# Patient Record
Sex: MALE | Race: WHITE | Employment: OTHER | URBAN - METROPOLITAN AREA
[De-identification: names, ages, dates, MRNs, and addresses within clinical notes are randomized per-mention and may not be internally consistent; named-entity substitution may affect disease eponyms.]

---

## 2017-05-22 ENCOUNTER — OFFICE VISIT (OUTPATIENT)
Dept: PODIATRY | Age: 82
End: 2017-05-22
Payer: MEDICARE

## 2017-05-22 VITALS
HEART RATE: 91 BPM | WEIGHT: 232 LBS | DIASTOLIC BLOOD PRESSURE: 74 MMHG | HEIGHT: 75 IN | BODY MASS INDEX: 28.85 KG/M2 | SYSTOLIC BLOOD PRESSURE: 132 MMHG

## 2017-05-22 DIAGNOSIS — Z79.4 TYPE 2 DIABETES MELLITUS WITH DIABETIC POLYNEUROPATHY, WITH LONG-TERM CURRENT USE OF INSULIN (HCC): ICD-10-CM

## 2017-05-22 DIAGNOSIS — L84 PRE-ULCERATIVE CORN OR CALLOUS: ICD-10-CM

## 2017-05-22 DIAGNOSIS — L98.9 BENIGN SKIN LESION: ICD-10-CM

## 2017-05-22 DIAGNOSIS — B35.1 ONYCHOMYCOSIS: Primary | ICD-10-CM

## 2017-05-22 DIAGNOSIS — E11.42 TYPE 2 DIABETES MELLITUS WITH DIABETIC POLYNEUROPATHY, WITH LONG-TERM CURRENT USE OF INSULIN (HCC): ICD-10-CM

## 2017-05-22 DIAGNOSIS — M21.962 FOOT DEFORMITY, LEFT: ICD-10-CM

## 2017-05-22 PROCEDURE — 1036F TOBACCO NON-USER: CPT | Performed by: PODIATRIST

## 2017-05-22 PROCEDURE — 11721 DEBRIDE NAIL 6 OR MORE: CPT | Performed by: PODIATRIST

## 2017-05-22 RX ORDER — METOPROLOL TARTRATE 50 MG/1
50 TABLET, FILM COATED ORAL DAILY
COMMUNITY
Start: 2017-05-06

## 2017-05-22 ASSESSMENT — ENCOUNTER SYMPTOMS
NAUSEA: 0
COLOR CHANGE: 0
DIARRHEA: 0
CONSTIPATION: 0
VOMITING: 0

## 2017-05-31 ENCOUNTER — NURSE ONLY (OUTPATIENT)
Dept: PODIATRY | Age: 82
End: 2017-05-31

## 2017-05-31 DIAGNOSIS — E11.42 TYPE 2 DIABETES MELLITUS WITH DIABETIC POLYNEUROPATHY, WITH LONG-TERM CURRENT USE OF INSULIN (HCC): Primary | ICD-10-CM

## 2017-05-31 DIAGNOSIS — Z79.4 TYPE 2 DIABETES MELLITUS WITH DIABETIC POLYNEUROPATHY, WITH LONG-TERM CURRENT USE OF INSULIN (HCC): Primary | ICD-10-CM

## 2017-05-31 DIAGNOSIS — L84 PRE-ULCERATIVE CORN OR CALLOUS: ICD-10-CM

## 2017-05-31 DIAGNOSIS — M21.962 FOOT DEFORMITY, LEFT: ICD-10-CM

## 2017-06-16 ENCOUNTER — NURSE ONLY (OUTPATIENT)
Dept: PODIATRY | Age: 82
End: 2017-06-16
Payer: MEDICARE

## 2017-06-16 DIAGNOSIS — M21.962 FOOT DEFORMITY, LEFT: ICD-10-CM

## 2017-06-16 DIAGNOSIS — L84 PRE-ULCERATIVE CORN OR CALLOUS: ICD-10-CM

## 2017-06-16 DIAGNOSIS — Z79.4 TYPE 2 DIABETES MELLITUS WITH DIABETIC POLYNEUROPATHY, WITH LONG-TERM CURRENT USE OF INSULIN (HCC): Primary | ICD-10-CM

## 2017-06-16 DIAGNOSIS — E11.42 TYPE 2 DIABETES MELLITUS WITH DIABETIC POLYNEUROPATHY, WITH LONG-TERM CURRENT USE OF INSULIN (HCC): Primary | ICD-10-CM

## 2017-06-16 PROCEDURE — A5500 DIAB SHOE FOR DENSITY INSERT: HCPCS | Performed by: PODIATRIST

## 2017-06-16 PROCEDURE — A5513 MULTI DEN INSERT CUSTOM MOLD: HCPCS | Performed by: PODIATRIST

## 2017-08-21 ENCOUNTER — PROCEDURE VISIT (OUTPATIENT)
Dept: PODIATRY | Age: 82
End: 2017-08-21
Payer: MEDICARE

## 2017-08-21 VITALS
DIASTOLIC BLOOD PRESSURE: 87 MMHG | HEIGHT: 75 IN | HEART RATE: 81 BPM | SYSTOLIC BLOOD PRESSURE: 163 MMHG | WEIGHT: 232 LBS | BODY MASS INDEX: 28.85 KG/M2

## 2017-08-21 DIAGNOSIS — E11.42 TYPE 2 DIABETES MELLITUS WITH DIABETIC POLYNEUROPATHY, WITH LONG-TERM CURRENT USE OF INSULIN (HCC): ICD-10-CM

## 2017-08-21 DIAGNOSIS — Z79.4 TYPE 2 DIABETES MELLITUS WITH DIABETIC POLYNEUROPATHY, WITH LONG-TERM CURRENT USE OF INSULIN (HCC): ICD-10-CM

## 2017-08-21 DIAGNOSIS — B35.1 ONYCHOMYCOSIS: Primary | ICD-10-CM

## 2017-08-21 PROCEDURE — 11721 DEBRIDE NAIL 6 OR MORE: CPT | Performed by: PODIATRIST

## 2017-08-21 PROCEDURE — 1036F TOBACCO NON-USER: CPT | Performed by: PODIATRIST

## 2017-08-21 RX ORDER — MONTELUKAST SODIUM 10 MG/1
TABLET ORAL
COMMUNITY
Start: 2017-08-15

## 2017-08-21 ASSESSMENT — ENCOUNTER SYMPTOMS
DIARRHEA: 0
COLOR CHANGE: 0
NAUSEA: 0
CONSTIPATION: 0
VOMITING: 0

## 2017-10-18 ENCOUNTER — NURSE ONLY (OUTPATIENT)
Dept: PODIATRY | Age: 82
End: 2017-10-18

## 2017-11-02 ENCOUNTER — NURSE ONLY (OUTPATIENT)
Dept: PODIATRY | Age: 82
End: 2017-11-02

## 2017-11-06 ENCOUNTER — NURSE ONLY (OUTPATIENT)
Dept: PODIATRY | Age: 82
End: 2017-11-06

## 2017-11-06 DIAGNOSIS — Z79.4 TYPE 2 DIABETES MELLITUS WITH DIABETIC POLYNEUROPATHY, WITH LONG-TERM CURRENT USE OF INSULIN (HCC): Primary | ICD-10-CM

## 2017-11-06 DIAGNOSIS — E11.42 TYPE 2 DIABETES MELLITUS WITH DIABETIC POLYNEUROPATHY, WITH LONG-TERM CURRENT USE OF INSULIN (HCC): Primary | ICD-10-CM

## 2017-11-06 DIAGNOSIS — M21.962 FOOT DEFORMITY, LEFT: ICD-10-CM

## 2017-11-06 DIAGNOSIS — L84 PRE-ULCERATIVE CORN OR CALLOUS: ICD-10-CM

## 2017-11-16 ENCOUNTER — NURSE ONLY (OUTPATIENT)
Dept: PODIATRY | Age: 82
End: 2017-11-16

## 2017-11-16 NOTE — PROGRESS NOTES
Dispensed 1 pair of diabetic shoes with 3 pairs of custom insoles. Appropriate papperwork was obtained from the primary care physician and the patient was fitted in the office.

## 2018-03-06 ENCOUNTER — NURSE ONLY (OUTPATIENT)
Dept: PODIATRY | Age: 83
End: 2018-03-06
Payer: MEDICARE

## 2018-03-06 VITALS
HEART RATE: 62 BPM | HEIGHT: 75 IN | SYSTOLIC BLOOD PRESSURE: 133 MMHG | BODY MASS INDEX: 28.85 KG/M2 | WEIGHT: 232 LBS | DIASTOLIC BLOOD PRESSURE: 64 MMHG

## 2018-03-06 DIAGNOSIS — B35.1 ONYCHOMYCOSIS: ICD-10-CM

## 2018-03-06 DIAGNOSIS — M21.962 FOOT DEFORMITY, LEFT: ICD-10-CM

## 2018-03-06 DIAGNOSIS — Z79.4 TYPE 2 DIABETES MELLITUS WITH DIABETIC POLYNEUROPATHY, WITH LONG-TERM CURRENT USE OF INSULIN (HCC): ICD-10-CM

## 2018-03-06 DIAGNOSIS — L84 PRE-ULCERATIVE CORN OR CALLOUS: ICD-10-CM

## 2018-03-06 DIAGNOSIS — L98.9 BENIGN SKIN LESION: Primary | ICD-10-CM

## 2018-03-06 DIAGNOSIS — E11.42 TYPE 2 DIABETES MELLITUS WITH DIABETIC POLYNEUROPATHY, WITH LONG-TERM CURRENT USE OF INSULIN (HCC): ICD-10-CM

## 2018-03-06 PROCEDURE — 11055 PARING/CUTG B9 HYPRKER LES 1: CPT | Performed by: PODIATRIST

## 2018-03-06 PROCEDURE — 11721 DEBRIDE NAIL 6 OR MORE: CPT | Performed by: PODIATRIST

## 2018-03-06 RX ORDER — BETAMETHASONE DIPROPIONATE 0.5 MG/G
CREAM TOPICAL
COMMUNITY
Start: 2018-03-02 | End: 2019-04-24

## 2018-03-06 ASSESSMENT — ENCOUNTER SYMPTOMS
VOMITING: 0
COLOR CHANGE: 0
CONSTIPATION: 0
NAUSEA: 0
DIARRHEA: 0

## 2018-03-06 NOTE — PROGRESS NOTES
[facility]  Return Patient    Chief Complaint   Patient presents with    Callouses     painful callous left foot/ last saw Jose A Hollingsworth 11/2/2017    Other     patient does not know last blood sugar       Subjective: Mary Grace Coughlin comes to clinic for [address] (painful callous left foot/ last saw Jose A Hollingsworth 11/2/2017) and Other (patient does not know last blood sugar)    he is a diabetic and states that his last A1c was 5.0.. He is doing much better and would like his nails trimmed. No problems on the left foot. Pt's primary care physician is Kelly Vigil, DO   Pt's last blood sugar was a little high. He also has a callous on his left foot that he wishes to have taken care of. No results found for: LABA1C   Complains of numbness in the feet bilat. Past Medical History:   Diagnosis Date    Arthritis     Asthma     Cystitis     Diabetic neuropathy (HCC)     Diarrhea     GERD (gastroesophageal reflux disease)     H/O acute bronchitis     [place] (hard of hearing)     Hx-TIA (transient ischemic attack)     h/o 4,  2009, 2012, 2014, 2015    Hypertension     Kyphosis of thoracolumbar region     Neurogenic claudication     Numbness and tingling     feet    Onychomycosis     Prolonged emergence from general anesthesia     Spinal stenosis of lumbar region     Spondylolysis of lumbar region     Type II or unspecified type diabetes mellitus without mention of complication, not stated as uncontrolled     Wears glasses        Allergies   Allergen Reactions    Bactrim [Sulfamethoxazole-Trimethoprim]      Unknown reaction    Other      baset    Sulfa Antibiotics Other (See Comments)     psoriasis    Sulfur      Causes pt to break out psorsias    Pcn [Penicillins] Hives     Also causes pt's throat to swell a little.      Current Outpatient Prescriptions on File Prior to Visit   Medication Sig Dispense Refill    montelukast (SINGULAIR) 10 MG tablet       metoprolol tartrate (LOPRESSOR) 50 MG tablet       omeprazole (PRILOSEC) 20 MG delayed release capsule Take 20 mg by mouth Daily Indications: ([facility] Mail order)       meloxicam (MOBIC) 15 MG tablet Take 15 mg by mouth daily Indications: ([facility] Mail order)       budesonide-formoterol (SYMBICORT) 160-4.5 MCG/ACT AERO Inhale 1 puff into the lungs 2 times daily Indications: ([facility] Mail order)       LANTUS SOLOSTAR 100 UNIT/ML injection pen Inject 60 Units into the skin daily (Patient taking differently: Inject 50 Units into the skin daily Indications: ([facility] order) ) 5 Pen 3    BD ULTRA-FINE PEN NEEDLES 29G X 12.7MM MISC Inject 1 each into the skin daily       clopidogrel (PLAVIX) 75 MG tablet Take 75 mg by mouth daily Indications: ([facility] Mail order)       loperamide (IMODIUM) 2 MG capsule Take 2 mg by mouth 4 times daily as needed.  albuterol (PROVENTIL HFA;VENTOLIN HFA) 108 (90 BASE) MCG/ACT inhaler Inhale 2 puffs into the lungs every 6 hours as needed. No current facility-administered medications on file prior to visit. Review of Systems   Constitutional: Negative for chills, diaphoresis, fatigue, fever and unexpected weight change. Cardiovascular: Negative for leg swelling. Gastrointestinal: Negative for constipation, diarrhea, nausea and vomiting. Musculoskeletal: Positive for gait problem. Negative for arthralgias and joint swelling. Skin: Negative for color change, pallor, rash and wound. Neurological: Positive for numbness. Negative for weakness. Objective:  General: AAO x 3 in NAD.     Derm  Sites of Onychomycosis Involvement (Check affected area)  [x] [x] [x] [x] [x] [x] [x] [x] [x] [x]  5 4 3 2 1 1 2 3 4 5                          Right                                        Left    Thickness  [x] [x] [x] [x] [x] [x] [x] [x] [x] [x]  5 4 3 2 1 1 2 3 4 5                         Right

## 2018-04-04 ENCOUNTER — HOSPITAL ENCOUNTER (OUTPATIENT)
Age: 83
Discharge: HOME | End: 2018-04-04
Payer: MEDICARE

## 2018-04-04 ENCOUNTER — HOSPITAL ENCOUNTER (OUTPATIENT)
Dept: VASCULAR LAB | Age: 83
Discharge: HOME | End: 2018-04-04
Payer: MEDICARE

## 2018-04-04 LAB
ABSOLUTE EOS #: 0.5 K/UL (ref 0–0.4)
ABSOLUTE IMMATURE GRANULOCYTE: ABNORMAL K/UL (ref 0–0.3)
ABSOLUTE LYMPH #: 2.8 K/UL (ref 1–4.8)
ABSOLUTE MONO #: 0.8 K/UL (ref 0.1–1.3)
ALBUMIN SERPL-MCNC: 3.9 G/DL (ref 3.5–5.2)
ALBUMIN/GLOBULIN RATIO: NORMAL (ref 1–2.5)
ALP BLD-CCNC: 73 U/L (ref 40–129)
ALT SERPL-CCNC: 10 U/L (ref 5–41)
ANION GAP SERPL CALCULATED.3IONS-SCNC: 12 MMOL/L (ref 9–17)
AST SERPL-CCNC: 15 U/L
BASOPHILS # BLD: 1 % (ref 0–2)
BASOPHILS ABSOLUTE: 0.1 K/UL (ref 0–0.2)
BILIRUB SERPL-MCNC: 0.52 MG/DL (ref 0.3–1.2)
BILIRUBIN DIRECT: 0.11 MG/DL
BILIRUBIN, INDIRECT: 0.41 MG/DL (ref 0–1)
BUN BLDV-MCNC: 14 MG/DL (ref 8–23)
BUN/CREAT BLD: ABNORMAL (ref 9–20)
CALCIUM SERPL-MCNC: 8.6 MG/DL (ref 8.6–10.4)
CHLORIDE BLD-SCNC: 101 MMOL/L (ref 98–107)
CHOLESTEROL/HDL RATIO: 3
CHOLESTEROL: 155 MG/DL
CO2: 23 MMOL/L (ref 20–31)
CREAT SERPL-MCNC: 1.29 MG/DL (ref 0.7–1.2)
DIFFERENTIAL TYPE: ABNORMAL
EOSINOPHILS RELATIVE PERCENT: 5 % (ref 0–4)
GFR AFRICAN AMERICAN: >60 ML/MIN
GFR NON-AFRICAN AMERICAN: 53 ML/MIN
GFR SERPL CREATININE-BSD FRML MDRD: ABNORMAL ML/MIN/{1.73_M2}
GFR SERPL CREATININE-BSD FRML MDRD: ABNORMAL ML/MIN/{1.73_M2}
GLOBULIN: NORMAL G/DL (ref 1.5–3.8)
GLUCOSE BLD-MCNC: 207 MG/DL (ref 70–99)
HCT VFR BLD CALC: 38.2 % (ref 41–53)
HDLC SERPL-MCNC: 51 MG/DL
HEMOGLOBIN: 13.2 G/DL (ref 13.5–17.5)
IMMATURE GRANULOCYTES: ABNORMAL %
LDL CHOLESTEROL: 71 MG/DL (ref 0–130)
LYMPHOCYTES # BLD: 30 % (ref 24–44)
MCH RBC QN AUTO: 29.4 PG (ref 26–34)
MCHC RBC AUTO-ENTMCNC: 34.6 G/DL (ref 31–37)
MCV RBC AUTO: 84.9 FL (ref 80–100)
MONOCYTES # BLD: 8 % (ref 1–7)
NRBC AUTOMATED: ABNORMAL PER 100 WBC
PDW BLD-RTO: 14.8 % (ref 11.5–14.9)
PLATELET # BLD: 271 K/UL (ref 150–450)
PLATELET ESTIMATE: ABNORMAL
PMV BLD AUTO: 7.3 FL (ref 6–12)
POTASSIUM SERPL-SCNC: 5.1 MMOL/L (ref 3.7–5.3)
RBC # BLD: 4.5 M/UL (ref 4.5–5.9)
RBC # BLD: ABNORMAL 10*6/UL
SEG NEUTROPHILS: 56 % (ref 36–66)
SEGMENTED NEUTROPHILS ABSOLUTE COUNT: 5.5 K/UL (ref 1.3–9.1)
SODIUM BLD-SCNC: 136 MMOL/L (ref 135–144)
THYROXINE, FREE: 1.16 NG/DL (ref 0.93–1.7)
TOTAL PROTEIN: 7.4 G/DL (ref 6.4–8.3)
TRIGL SERPL-MCNC: 166 MG/DL
TSH SERPL DL<=0.05 MIU/L-ACNC: 3.86 MIU/L (ref 0.3–5)
VITAMIN D 25-HYDROXY: 15.7 NG/ML (ref 30–100)
VLDLC SERPL CALC-MCNC: ABNORMAL MG/DL (ref 1–30)
WBC # BLD: 9.6 K/UL (ref 3.5–11)
WBC # BLD: ABNORMAL 10*3/UL

## 2018-04-04 PROCEDURE — 80048 BASIC METABOLIC PNL TOTAL CA: CPT

## 2018-04-04 PROCEDURE — 93880 EXTRACRANIAL BILAT STUDY: CPT

## 2018-04-04 PROCEDURE — 36415 COLL VENOUS BLD VENIPUNCTURE: CPT

## 2018-04-04 PROCEDURE — 82306 VITAMIN D 25 HYDROXY: CPT

## 2018-04-04 PROCEDURE — 84439 ASSAY OF FREE THYROXINE: CPT

## 2018-04-04 PROCEDURE — 84443 ASSAY THYROID STIM HORMONE: CPT

## 2018-04-04 PROCEDURE — 85025 COMPLETE CBC W/AUTO DIFF WBC: CPT

## 2018-04-04 PROCEDURE — 80061 LIPID PANEL: CPT

## 2018-04-04 PROCEDURE — 80076 HEPATIC FUNCTION PANEL: CPT

## 2018-07-06 ENCOUNTER — APPOINTMENT (OUTPATIENT)
Dept: GENERAL RADIOLOGY | Age: 83
End: 2018-07-06
Payer: MEDICARE

## 2018-07-06 ENCOUNTER — APPOINTMENT (OUTPATIENT)
Dept: CT IMAGING | Age: 83
End: 2018-07-06
Payer: MEDICARE

## 2018-07-06 ENCOUNTER — HOSPITAL ENCOUNTER (OUTPATIENT)
Age: 83
Setting detail: OBSERVATION
LOS: 1 days | Discharge: HOME HEALTH | End: 2018-07-08
Attending: EMERGENCY MEDICINE | Admitting: FAMILY MEDICINE
Payer: MEDICARE

## 2018-07-06 DIAGNOSIS — R42 DIZZINESS: Primary | ICD-10-CM

## 2018-07-06 LAB
ABSOLUTE EOS #: 0.5 K/UL (ref 0–0.4)
ABSOLUTE IMMATURE GRANULOCYTE: ABNORMAL K/UL (ref 0–0.3)
ABSOLUTE LYMPH #: 2.9 K/UL (ref 1–4.8)
ABSOLUTE MONO #: 0.8 K/UL (ref 0.1–1.3)
ANION GAP SERPL CALCULATED.3IONS-SCNC: 13 MMOL/L (ref 9–17)
BASOPHILS # BLD: 1 % (ref 0–2)
BASOPHILS ABSOLUTE: 0.1 K/UL (ref 0–0.2)
BILIRUBIN URINE: NEGATIVE
BUN BLDV-MCNC: 14 MG/DL (ref 8–23)
BUN/CREAT BLD: ABNORMAL (ref 9–20)
CALCIUM SERPL-MCNC: 9 MG/DL (ref 8.6–10.4)
CHLORIDE BLD-SCNC: 101 MMOL/L (ref 98–107)
CO2: 23 MMOL/L (ref 20–31)
COLOR: YELLOW
COMMENT UA: NORMAL
CREAT SERPL-MCNC: 1.38 MG/DL (ref 0.7–1.2)
DIFFERENTIAL TYPE: ABNORMAL
EKG ATRIAL RATE: 56 BPM
EKG P AXIS: 13 DEGREES
EKG P-R INTERVAL: 202 MS
EKG Q-T INTERVAL: 414 MS
EKG QRS DURATION: 90 MS
EKG QTC CALCULATION (BAZETT): 399 MS
EKG R AXIS: -14 DEGREES
EKG T AXIS: -6 DEGREES
EKG VENTRICULAR RATE: 56 BPM
EOSINOPHILS RELATIVE PERCENT: 5 % (ref 0–4)
GFR AFRICAN AMERICAN: 59 ML/MIN
GFR NON-AFRICAN AMERICAN: 49 ML/MIN
GFR SERPL CREATININE-BSD FRML MDRD: ABNORMAL ML/MIN/{1.73_M2}
GFR SERPL CREATININE-BSD FRML MDRD: ABNORMAL ML/MIN/{1.73_M2}
GLUCOSE BLD-MCNC: 181 MG/DL (ref 75–110)
GLUCOSE BLD-MCNC: 93 MG/DL (ref 70–99)
GLUCOSE URINE: NEGATIVE
HCT VFR BLD CALC: 42.1 % (ref 41–53)
HEMOGLOBIN: 14 G/DL (ref 13.5–17.5)
IMMATURE GRANULOCYTES: ABNORMAL %
KETONES, URINE: NEGATIVE
LEUKOCYTE ESTERASE, URINE: NEGATIVE
LYMPHOCYTES # BLD: 29 % (ref 24–44)
MCH RBC QN AUTO: 28.8 PG (ref 26–34)
MCHC RBC AUTO-ENTMCNC: 33.4 G/DL (ref 31–37)
MCV RBC AUTO: 86.2 FL (ref 80–100)
MONOCYTES # BLD: 8 % (ref 1–7)
NITRITE, URINE: NEGATIVE
NRBC AUTOMATED: ABNORMAL PER 100 WBC
PDW BLD-RTO: 15 % (ref 11.5–14.9)
PH UA: 5.5 (ref 5–8)
PLATELET # BLD: 327 K/UL (ref 150–450)
PLATELET ESTIMATE: ABNORMAL
PMV BLD AUTO: 7.6 FL (ref 6–12)
POTASSIUM SERPL-SCNC: 4.4 MMOL/L (ref 3.7–5.3)
PROTEIN UA: NEGATIVE
RBC # BLD: 4.88 M/UL (ref 4.5–5.9)
RBC # BLD: ABNORMAL 10*6/UL
SEG NEUTROPHILS: 57 % (ref 36–66)
SEGMENTED NEUTROPHILS ABSOLUTE COUNT: 5.8 K/UL (ref 1.3–9.1)
SODIUM BLD-SCNC: 137 MMOL/L (ref 135–144)
SPECIFIC GRAVITY UA: 1.01 (ref 1–1.03)
TROPONIN INTERP: NORMAL
TROPONIN INTERP: NORMAL
TROPONIN T: <0.03 NG/ML
TROPONIN T: <0.03 NG/ML
TURBIDITY: CLEAR
URINE HGB: NEGATIVE
UROBILINOGEN, URINE: NORMAL
WBC # BLD: 10.1 K/UL (ref 3.5–11)
WBC # BLD: ABNORMAL 10*3/UL

## 2018-07-06 PROCEDURE — 2580000003 HC RX 258: Performed by: FAMILY MEDICINE

## 2018-07-06 PROCEDURE — 96372 THER/PROPH/DIAG INJ SC/IM: CPT

## 2018-07-06 PROCEDURE — 81003 URINALYSIS AUTO W/O SCOPE: CPT

## 2018-07-06 PROCEDURE — 2580000003 HC RX 258: Performed by: EMERGENCY MEDICINE

## 2018-07-06 PROCEDURE — 82947 ASSAY GLUCOSE BLOOD QUANT: CPT

## 2018-07-06 PROCEDURE — 71045 X-RAY EXAM CHEST 1 VIEW: CPT

## 2018-07-06 PROCEDURE — 84484 ASSAY OF TROPONIN QUANT: CPT

## 2018-07-06 PROCEDURE — 6370000000 HC RX 637 (ALT 250 FOR IP): Performed by: FAMILY MEDICINE

## 2018-07-06 PROCEDURE — 80048 BASIC METABOLIC PNL TOTAL CA: CPT

## 2018-07-06 PROCEDURE — G0378 HOSPITAL OBSERVATION PER HR: HCPCS

## 2018-07-06 PROCEDURE — 93005 ELECTROCARDIOGRAM TRACING: CPT

## 2018-07-06 PROCEDURE — 6360000002 HC RX W HCPCS: Performed by: FAMILY MEDICINE

## 2018-07-06 PROCEDURE — 85025 COMPLETE CBC W/AUTO DIFF WBC: CPT

## 2018-07-06 PROCEDURE — 99285 EMERGENCY DEPT VISIT HI MDM: CPT

## 2018-07-06 PROCEDURE — 70450 CT HEAD/BRAIN W/O DYE: CPT

## 2018-07-06 PROCEDURE — 36415 COLL VENOUS BLD VENIPUNCTURE: CPT

## 2018-07-06 RX ORDER — 0.9 % SODIUM CHLORIDE 0.9 %
500 INTRAVENOUS SOLUTION INTRAVENOUS ONCE
Status: COMPLETED | OUTPATIENT
Start: 2018-07-06 | End: 2018-07-06

## 2018-07-06 RX ORDER — SODIUM CHLORIDE 9 MG/ML
INJECTION, SOLUTION INTRAVENOUS CONTINUOUS
Status: DISCONTINUED | OUTPATIENT
Start: 2018-07-06 | End: 2018-07-08 | Stop reason: HOSPADM

## 2018-07-06 RX ORDER — DEXTROSE MONOHYDRATE 25 G/50ML
12.5 INJECTION, SOLUTION INTRAVENOUS PRN
Status: DISCONTINUED | OUTPATIENT
Start: 2018-07-06 | End: 2018-07-08 | Stop reason: HOSPADM

## 2018-07-06 RX ORDER — SODIUM CHLORIDE 0.9 % (FLUSH) 0.9 %
10 SYRINGE (ML) INJECTION PRN
Status: DISCONTINUED | OUTPATIENT
Start: 2018-07-06 | End: 2018-07-08 | Stop reason: HOSPADM

## 2018-07-06 RX ORDER — MECLIZINE HCL 12.5 MG/1
12.5 TABLET ORAL 3 TIMES DAILY PRN
Status: ON HOLD | COMMUNITY
End: 2018-08-07 | Stop reason: HOSPADM

## 2018-07-06 RX ORDER — ACETAMINOPHEN 325 MG/1
650 TABLET ORAL EVERY 4 HOURS PRN
Status: DISCONTINUED | OUTPATIENT
Start: 2018-07-06 | End: 2018-07-08 | Stop reason: HOSPADM

## 2018-07-06 RX ORDER — DEXTROSE MONOHYDRATE 50 MG/ML
100 INJECTION, SOLUTION INTRAVENOUS PRN
Status: DISCONTINUED | OUTPATIENT
Start: 2018-07-06 | End: 2018-07-08 | Stop reason: HOSPADM

## 2018-07-06 RX ORDER — NICOTINE POLACRILEX 4 MG
15 LOZENGE BUCCAL PRN
Status: DISCONTINUED | OUTPATIENT
Start: 2018-07-06 | End: 2018-07-08 | Stop reason: HOSPADM

## 2018-07-06 RX ORDER — SODIUM CHLORIDE 0.9 % (FLUSH) 0.9 %
10 SYRINGE (ML) INJECTION EVERY 12 HOURS SCHEDULED
Status: DISCONTINUED | OUTPATIENT
Start: 2018-07-06 | End: 2018-07-08 | Stop reason: HOSPADM

## 2018-07-06 RX ADMIN — SODIUM CHLORIDE 500 ML: 9 INJECTION, SOLUTION INTRAVENOUS at 17:50

## 2018-07-06 RX ADMIN — SODIUM CHLORIDE: 9 INJECTION, SOLUTION INTRAVENOUS at 23:19

## 2018-07-06 RX ADMIN — Medication 10 ML: at 23:21

## 2018-07-06 RX ADMIN — ENOXAPARIN SODIUM 30 MG: 30 INJECTION, SOLUTION INTRAVENOUS; SUBCUTANEOUS at 23:21

## 2018-07-06 RX ADMIN — INSULIN LISPRO 1 UNITS: 100 INJECTION, SOLUTION INTRAVENOUS; SUBCUTANEOUS at 23:28

## 2018-07-06 ASSESSMENT — ENCOUNTER SYMPTOMS
ABDOMINAL PAIN: 0
COLOR CHANGE: 0
PHOTOPHOBIA: 0
WHEEZING: 0
NAUSEA: 0
SORE THROAT: 0
COUGH: 0
FACIAL SWELLING: 0
VOMITING: 0
BLOOD IN STOOL: 0
CONSTIPATION: 0
CHEST TIGHTNESS: 0
DIARRHEA: 0
SHORTNESS OF BREATH: 0
ABDOMINAL DISTENTION: 0

## 2018-07-06 ASSESSMENT — PAIN SCALES - GENERAL: PAINLEVEL_OUTOF10: 0

## 2018-07-06 NOTE — ED PROVIDER NOTES
[facility]  Emergency Department Encounter  Emergency Medicine Resident     Pt Name: Wilder May  MRN: [id number]  [place] 3/12/1931  Date of evaluation: 7/6/18  PCP:  Deandre Bowden DO    CHIEF COMPLAINT       Chief Complaint   Patient presents with    Dizziness       HISTORY OF PRESENT ILLNESS  (Location/Symptom, Timing/Onset, Context/Setting, Quality, Duration, Modifying Factors, Severity.)      Wilder May is a 80 y.o. male who presents with Primary complaint views had 2 weeks of worsening dizziness. The patient has a history of dizziness and is on meclizine for this. The patient states over the last few days the dizziness has worsened. The patient also has a history of for TIAs as well as a stroke that resulted in some left arm weakness. The patient denies any new focal deficits today. The patient is also diabetic and the family member indicates that he is poorly controlled and does not check his blood sugar as he is supposed to. PAST MEDICAL / SURGICAL / SOCIAL / FAMILY HISTORY      has a past medical history of Arthritis; Asthma; Cystitis; Diabetic neuropathy ([address] Diarrhea; GERD (gastroesophageal reflux disease); H/O acute bronchitis; [place] (hard of hearing); Hx-TIA (transient ischemic attack); Hypertension; Kyphosis of thoracolumbar region; Neurogenic claudication; Numbness and tingling; Onychomycosis; Prolonged emergence from general anesthesia; Spinal stenosis of lumbar region; Spondylolysis of lumbar region; Type II or unspecified type diabetes mellitus without mention of complication, not stated as uncontrolled; and Wears glasses. has a past surgical history that includes Tonsillectomy; Cystocopy; Colonoscopy; Total knee arthroplasty (Left); Inguinal hernia repair (Bilateral); Cataract removal with implant (Bilateral); Lumbar spine surgery; Revision total knee arthroplasty (Left); and Total hip arthroplasty (Right, 07/13/2016).     Social History     Social History    Marital status:      Spouse name: N/A    Number of children: N/A    Years of education: N/A     Occupational History    Not on file. Social History Main Topics    Smoking status: Former Smoker     Types: Cigarettes    Smokeless tobacco: Former User     Quit date: 7/6/1986      Comment: stopped 30 years ago     Alcohol use No    Drug use: No    Sexual activity: Not on file     Other Topics Concern    Not on file     Social History Narrative    No narrative on file       Family History   Problem Relation Age of Onset    Emphysema Mother     Heart Attack Father     Coronary Art Dis Father     High Blood Pressure Father        Allergies:  Bactrim [sulfamethoxazole-trimethoprim]; Other; Sulfa antibiotics; Sulfur; and Pcn [penicillins]    Home Medications:  Prior to Admission medications    Medication Sig Start Date End Date Taking?  Authorizing Provider   meclizine (ANTIVERT) 12.5 MG tablet Take 12.5 mg by mouth 3 times daily as needed   Yes Historical Provider, MD   phenylephrine (MARTÍN-SYNEPHRINE) 0.5 % nasal spray 1 spray by Nasal route every 4 hours as needed for Congestion   Yes Historical Provider, MD   metoprolol tartrate (LOPRESSOR) 50 MG tablet  5/6/17  Yes Historical Provider, MD   omeprazole (PRILOSEC) 20 MG delayed release capsule Take 20 mg by mouth Daily Indications: ([facility] Mail order)    Yes Historical Provider, MD   budesonide-formoterol (SYMBICORT) 160-4.5 MCG/ACT AERO Inhale 1 puff into the lungs 2 times daily Indications: ([facility] Mail order)    Yes Historical Provider, MD   LANTUS SOLOSTAR 100 UNIT/ML injection pen Inject 60 Units into the skin daily  Patient taking differently: Inject 50 Units into the skin daily Indications: ([facility] Mail order)  7/16/16  Yes Chelly Arevalo MD   clopidogrel (PLAVIX) 75 MG tablet Take 75 mg by mouth daily Indications: ([facility] Mail order)    Yes Historical Provider, MD   loperamide (IMODIUM) 2 MG capsule Take 2 mg by mouth 4 times daily as needed. Yes Historical Provider, MD   augmented betamethasone dipropionate (DIPROLENE-AF) 0.05 % cream  3/2/18   Historical Provider, MD   montelukast (SINGULAIR) 10 MG tablet  8/15/17   Historical Provider, MD   meloxicam (MOBIC) 15 MG tablet Take 15 mg by mouth daily Indications: ([facility] Mail order)     Historical Provider, MD   BD ULTRA-FINE PEN NEEDLES 29G X 12.7MM MISC Inject 1 each into the skin daily  4/7/16   Historical Provider, MD   albuterol (PROVENTIL HFA;VENTOLIN HFA) 108 (90 BASE) MCG/ACT inhaler Inhale 2 puffs into the lungs every 6 hours as needed. Historical Provider, MD       REVIEW OF SYSTEMS    (2-9 systems for level 4, 10 or more for level 5)      Review of Systems   Constitutional: Negative for activity change, appetite change, chills and diaphoresis. HENT: Positive for hearing loss (chronic). Negative for facial swelling, nosebleeds and sore throat. Eyes: Negative for photophobia and visual disturbance. Respiratory: Negative for cough, chest tightness, shortness of breath and wheezing. Cardiovascular: Positive for leg swelling (chronic no new changes). Negative for chest pain and palpitations. Gastrointestinal: Negative for abdominal distention, abdominal pain, blood in stool, constipation, diarrhea, nausea and vomiting. Genitourinary: Negative for difficulty urinating, dysuria and hematuria. Musculoskeletal: Negative for joint swelling and neck pain. Skin: Negative for color change, pallor and rash. Neurological: Positive for light-headedness (patient denies the room spinning states this is more lightheaded in nature than dizziness). Negative for dizziness, seizures, facial asymmetry, speech difficulty and headaches. Psychiatric/Behavioral: Negative for agitation, behavioral problems and confusion.        PHYSICAL EXAM   (up to 7 for level 4, 8 or more for level 5)      INITIAL VITALS:   /64   Pulse 60   Temp 98.1 °F silhouette mediastinal shadow unremarkable. Bony thorax intact. Bibasilar interstitial infiltrates       EKG    EKG: sinus bradycardia with T wave inversion in lead III compared when compared to EKG from July 2016 this EKGs rate is bradycardic while that rate was 77 and the T-wave inversion in lead III is more pronounced. All EKG's are interpreted by the Emergency Department Physician who either signs or Co-signs this chart in the absence of a cardiologist.    MDM/EMERGENCY DEPARTMENT COURSE:    3:47 PM    ED Course as of Jul 06 2157 Fri Jul 06, 2018   1710 Troponin T: <0.03 [KW]   1711 CT Head WO Contrast [KW]   1711 Patient's head CT does not show any acute findings, chest x-ray does show possible bibasilar infiltrates however clinically patient has not been complaining of any cough or any shortness of breath.  [address] Patient is admitted for further workup of his dizziness. [KW]      ED Course User Index  [KW] Milly Kim DO     Patient transferred to the floor with no acute events while in our emergency department. PROCEDURES:  None    CONSULTS:  IP CONSULT TO PRIMARY CARE PROVIDER    CRITICAL CARE:  None    FINAL IMPRESSION      1.  Dizziness          DISPOSITION / PLAN     DISPOSITION Admitted    PATIENT REFERRED TO:  Neetu Erickson [address]  [phone]            DISCHARGE MEDICATIONS:  Current Discharge Medication List          Milly Kim DO  Emergency Medicine Resident    (Please note that portions of this note were completed with a voice recognition program.  Efforts were made to edit the dictations but occasionally words are mis-transcribed.)       Milly Kim DO  07/06/18 7834

## 2018-07-07 LAB
GLUCOSE BLD-MCNC: 126 MG/DL (ref 75–110)
GLUCOSE BLD-MCNC: 159 MG/DL (ref 75–110)
GLUCOSE BLD-MCNC: 159 MG/DL (ref 75–110)
GLUCOSE BLD-MCNC: 225 MG/DL (ref 75–110)

## 2018-07-07 PROCEDURE — 82947 ASSAY GLUCOSE BLOOD QUANT: CPT

## 2018-07-07 PROCEDURE — 6360000002 HC RX W HCPCS: Performed by: FAMILY MEDICINE

## 2018-07-07 PROCEDURE — 6370000000 HC RX 637 (ALT 250 FOR IP): Performed by: FAMILY MEDICINE

## 2018-07-07 PROCEDURE — G0378 HOSPITAL OBSERVATION PER HR: HCPCS

## 2018-07-07 PROCEDURE — 96372 THER/PROPH/DIAG INJ SC/IM: CPT

## 2018-07-07 RX ORDER — CLOPIDOGREL BISULFATE 75 MG/1
75 TABLET ORAL DAILY
Status: DISCONTINUED | OUTPATIENT
Start: 2018-07-07 | End: 2018-07-08 | Stop reason: HOSPADM

## 2018-07-07 RX ORDER — ALBUTEROL SULFATE 90 UG/1
2 AEROSOL, METERED RESPIRATORY (INHALATION) EVERY 4 HOURS PRN
Status: DISCONTINUED | OUTPATIENT
Start: 2018-07-07 | End: 2018-07-08 | Stop reason: HOSPADM

## 2018-07-07 RX ORDER — LOPERAMIDE HYDROCHLORIDE 2 MG/1
2 CAPSULE ORAL 4 TIMES DAILY PRN
Status: DISCONTINUED | OUTPATIENT
Start: 2018-07-07 | End: 2018-07-08 | Stop reason: HOSPADM

## 2018-07-07 RX ORDER — METOPROLOL TARTRATE 50 MG/1
50 TABLET, FILM COATED ORAL 2 TIMES DAILY
Status: DISCONTINUED | OUTPATIENT
Start: 2018-07-07 | End: 2018-07-08 | Stop reason: HOSPADM

## 2018-07-07 RX ORDER — OMEPRAZOLE 20 MG/1
20 CAPSULE, DELAYED RELEASE ORAL DAILY
Status: DISCONTINUED | OUTPATIENT
Start: 2018-07-07 | End: 2018-07-08 | Stop reason: HOSPADM

## 2018-07-07 RX ORDER — INSULIN GLARGINE 100 [IU]/ML
60 INJECTION, SOLUTION SUBCUTANEOUS NIGHTLY
Status: DISCONTINUED | OUTPATIENT
Start: 2018-07-07 | End: 2018-07-08 | Stop reason: HOSPADM

## 2018-07-07 RX ORDER — MECLIZINE HYDROCHLORIDE 25 MG/1
12.5 TABLET ORAL 3 TIMES DAILY PRN
Status: DISCONTINUED | OUTPATIENT
Start: 2018-07-07 | End: 2018-07-08 | Stop reason: HOSPADM

## 2018-07-07 RX ORDER — MELOXICAM 15 MG/1
15 TABLET ORAL DAILY
Status: DISCONTINUED | OUTPATIENT
Start: 2018-07-07 | End: 2018-07-08 | Stop reason: HOSPADM

## 2018-07-07 RX ADMIN — OMEPRAZOLE 20 MG: 20 CAPSULE, DELAYED RELEASE ORAL at 17:24

## 2018-07-07 RX ADMIN — CLOPIDOGREL BISULFATE 75 MG: 75 TABLET ORAL at 17:24

## 2018-07-07 RX ADMIN — ENOXAPARIN SODIUM 30 MG: 30 INJECTION, SOLUTION INTRAVENOUS; SUBCUTANEOUS at 21:47

## 2018-07-07 RX ADMIN — MOMETASONE FUROATE AND FORMOTEROL FUMARATE DIHYDRATE 2 PUFF: 200; 5 AEROSOL RESPIRATORY (INHALATION) at 00:50

## 2018-07-07 RX ADMIN — MELOXICAM 15 MG: 15 TABLET ORAL at 17:24

## 2018-07-07 RX ADMIN — INSULIN LISPRO 1 UNITS: 100 INJECTION, SOLUTION INTRAVENOUS; SUBCUTANEOUS at 11:44

## 2018-07-07 RX ADMIN — INSULIN GLARGINE 60 UNITS: 100 INJECTION, SOLUTION SUBCUTANEOUS at 21:53

## 2018-07-07 RX ADMIN — ENOXAPARIN SODIUM 30 MG: 30 INJECTION, SOLUTION INTRAVENOUS; SUBCUTANEOUS at 08:32

## 2018-07-07 RX ADMIN — METOPROLOL TARTRATE 50 MG: 50 TABLET ORAL at 21:49

## 2018-07-07 RX ADMIN — INSULIN LISPRO 2 UNITS: 100 INJECTION, SOLUTION INTRAVENOUS; SUBCUTANEOUS at 17:23

## 2018-07-07 RX ADMIN — MOMETASONE FUROATE AND FORMOTEROL FUMARATE DIHYDRATE 2 PUFF: 200; 5 AEROSOL RESPIRATORY (INHALATION) at 21:51

## 2018-07-07 RX ADMIN — MECLIZINE HYDROCHLORIDE 12.5 MG: 25 TABLET ORAL at 17:24

## 2018-07-07 RX ADMIN — MOMETASONE FUROATE AND FORMOTEROL FUMARATE DIHYDRATE 2 PUFF: 200; 5 AEROSOL RESPIRATORY (INHALATION) at 08:32

## 2018-07-07 ASSESSMENT — PAIN SCALES - GENERAL
PAINLEVEL_OUTOF10: 0

## 2018-07-07 NOTE — PROGRESS NOTES
Dr. Melvi Gillette        Patient:  Sherrill Arthur  YOB: 1931    MRN: [id number]     Acct: [de-identified]     Admit date: 7/6/2018    Pt seen and Chart reviewed. Consultant notes reviewed and care evaluated. Problem List:  Patient Active Problem List   Diagnosis Code    Lumbosacral spondylosis without myelopathy M47.817    Degeneration of lumbar or lumbosacral intervertebral disc M51.37    Spinal stenosis, lumbar region, with neurogenic claudication M48.062    Lumbar spondylolysis M43.06    Postural kyphosis of lumbar region M40.05    Arthritis of right hip M16.11    Right hip pain M25.551    Weight loss R63.4    Right lower quadrant abdominal pain R10.31    Anemia D64.9    Dizziness R42         Subjective: patient admitted for dizziness, appears to be stable.   Will have ENT eval and PT     Diet:  DIET CARB CONTROL;      Medications:Current Inpatient    Scheduled Meds:   clopidogrel  75 mg Oral Daily    insulin glargine  60 Units Subcutaneous Nightly    meloxicam  15 mg Oral Daily    metoprolol tartrate  50 mg Oral BID    omeprazole  20 mg Oral Daily    sodium chloride flush  10 mL Intravenous 2 times per day    enoxaparin  30 mg Subcutaneous BID    insulin lispro  0-6 Units Subcutaneous TID WC    insulin lispro  0-3 Units Subcutaneous Nightly    mometasone-formoterol  2 puff Inhalation BID    Doxylamine-Phenylephrine-APAP  1 capsule Oral BID     Continuous Infusions:   sodium chloride 75 mL/hr at 07/06/18 2319    dextrose       PRN Meds:albuterol sulfate HFA, loperamide, meclizine, sodium chloride flush, acetaminophen, glucose, dextrose, glucagon (rDNA), dextrose    Objective:    Physical Exam:  Vitals: BP (!) 134/55   Pulse 76   Temp 97.3 °F (36.3 °C) (Oral)   Resp 16   Ht 6' 3\" (1.905 m)   Wt 230 lb (104.3 kg)   SpO2 97%   BMI 28.75 kg/m²   24 hour intake/output:  Intake/Output Summary (Last 24 hours) at 07/07/18 1508  Last data filed at 07/07/18 1424   Gross per 24 hour   Intake             1180 ml   Output             2150 ml   Net             -970 ml     Last 3 weights: Wt Readings from Last 3 Encounters:   07/06/18 230 lb (104.3 kg)   03/06/18 232 lb (105.2 kg)   08/21/17 232 lb (105.2 kg)       Physical Examination:   General appearance - alert, well appearing, and in no distress  Mental status - alert appears oriented   Chest - clear to auscultation, no wheezes, rales or rhonchi, symmetric air entry  Heart - normal rate, regular rhythm, normal S1, S2, no murmurs, rubs, clicks or gallops  Abdomen - soft, nontender, nondistended, no masses or organomegaly  Neurological - }  Extremities - peripheral pulses normal, no pedal edema, no clubbing or cyanosis  Skin -      Labs:-    CBC:   Recent Labs      07/06/18   1630   WBC  10.1   HGB  14.0   PLT  327     BMP:  Recent Labs      07/06/18   1630   NA  137   K  4.4   CL  101   CO2  23   BUN  14   CREATININE  1.38*   GLUCOSE  93     Glucose:  Recent Labs      07/06/18   2053  07/07/18   0651  07/07/18   1138   POCGLU  181*  126*  159*     HgbA1C: No results for input(s): LABA1C in the last 72 hours. INR: No results for input(s): INR in the last 72 hours. CARDIAC ENZYMES:No results for input(s): CKTOTAL, CKMB, CKMBINDEX, TROPONINI in the last 72 hours. BNP: No results for input(s): BNP in the last 72 hours. Lipids: No results for input(s): CHOL, TRIG, HDL, LDLCALC in the last 72 hours.     Invalid input(s): LDL  ABGs: No results found for: PH, PCO2, PO2, HCO3, O2SAT  Thyroid:   Lab Results   Component Value Date    TSH 3.86 04/04/2018      Urinalysis: Color, UA   Date Value Ref Range Status   07/06/2018 YELLOW YEL Final     pH, UA   Date Value Ref Range Status   07/06/2018 5.5 5.0 - 8.0 Final     Specific Gravity, UA   Date Value Ref Range Status   07/06/2018 1.010 1.000 -

## 2018-07-07 NOTE — PLAN OF CARE
Problem: Falls - Risk of:  Goal: Will remain free from falls  Will remain free from falls   Outcome: Met This Shift  Pt. Free of falls and injuries this shift.

## 2018-07-08 VITALS
DIASTOLIC BLOOD PRESSURE: 67 MMHG | BODY MASS INDEX: 28.6 KG/M2 | WEIGHT: 230 LBS | HEIGHT: 75 IN | SYSTOLIC BLOOD PRESSURE: 168 MMHG | OXYGEN SATURATION: 99 % | RESPIRATION RATE: 20 BRPM | TEMPERATURE: 97.3 F | HEART RATE: 60 BPM

## 2018-07-08 LAB
GLUCOSE BLD-MCNC: 122 MG/DL (ref 75–110)
GLUCOSE BLD-MCNC: 44 MG/DL (ref 75–110)
GLUCOSE BLD-MCNC: 75 MG/DL (ref 75–110)

## 2018-07-08 PROCEDURE — G0378 HOSPITAL OBSERVATION PER HR: HCPCS

## 2018-07-08 PROCEDURE — G8979 MOBILITY GOAL STATUS: HCPCS

## 2018-07-08 PROCEDURE — 97161 PT EVAL LOW COMPLEX 20 MIN: CPT

## 2018-07-08 PROCEDURE — 2580000003 HC RX 258: Performed by: FAMILY MEDICINE

## 2018-07-08 PROCEDURE — 6370000000 HC RX 637 (ALT 250 FOR IP): Performed by: FAMILY MEDICINE

## 2018-07-08 PROCEDURE — G8978 MOBILITY CURRENT STATUS: HCPCS

## 2018-07-08 PROCEDURE — 6360000002 HC RX W HCPCS: Performed by: FAMILY MEDICINE

## 2018-07-08 PROCEDURE — 82947 ASSAY GLUCOSE BLOOD QUANT: CPT

## 2018-07-08 PROCEDURE — 96372 THER/PROPH/DIAG INJ SC/IM: CPT

## 2018-07-08 PROCEDURE — G8980 MOBILITY D/C STATUS: HCPCS

## 2018-07-08 PROCEDURE — 97116 GAIT TRAINING THERAPY: CPT

## 2018-07-08 RX ADMIN — ENOXAPARIN SODIUM 30 MG: 30 INJECTION, SOLUTION INTRAVENOUS; SUBCUTANEOUS at 08:12

## 2018-07-08 RX ADMIN — OMEPRAZOLE 20 MG: 20 CAPSULE, DELAYED RELEASE ORAL at 05:41

## 2018-07-08 RX ADMIN — METOPROLOL TARTRATE 50 MG: 50 TABLET ORAL at 08:12

## 2018-07-08 RX ADMIN — MECLIZINE HYDROCHLORIDE 12.5 MG: 25 TABLET ORAL at 10:22

## 2018-07-08 RX ADMIN — MOMETASONE FUROATE AND FORMOTEROL FUMARATE DIHYDRATE 2 PUFF: 200; 5 AEROSOL RESPIRATORY (INHALATION) at 08:20

## 2018-07-08 RX ADMIN — SODIUM CHLORIDE: 9 INJECTION, SOLUTION INTRAVENOUS at 13:02

## 2018-07-08 RX ADMIN — MELOXICAM 15 MG: 15 TABLET ORAL at 08:11

## 2018-07-08 RX ADMIN — CLOPIDOGREL BISULFATE 75 MG: 75 TABLET ORAL at 08:12

## 2018-07-08 ASSESSMENT — PAIN SCALES - GENERAL: PAINLEVEL_OUTOF10: 0

## 2018-07-08 NOTE — CARE COORDINATION
CASE MANAGEMENT NOTE:    Admission Date:  7/6/2018 Arthur Cardenas is a 80 y.o.  male    Admitted for : Dizziness [R42]  Dizziness [R42]  Dizziness [R42]    Met with:  Patient    PCP:  Viktor Medrano                                Insurance:  Medicare      Current Residence/ Living Arrangements:  independently at home , w/ Wife          Current Services PTA:  No    Is patient agreeable to VNS: Yes    Freedom of choice provided: Yes         VNS chosen:  Yes, [address]    DME:  shower chair, Rollator, 4 wheeled Hover Round, Glucometer/supplies    Home Oxygen: No    Nebulizer: No    Supplier: N/A    Potential Assistance Needed: Yes, VNS    SNF needed: Will refuse    Pharmacy:  [address] [facility]       Does Patient want to use MEDS to BEDS? No    Family Members/Caregivers that pt would like involved in their care:    Yes    If yes, list name here:  Wife, Joanna Merchant    Transportation Provider:  Family                      Discharge Plan:  7/7/18 Medicare Pt. Lives in 1 story hoe w/ Basement steps, w/ Wife. DME 4 wheeled Hover Round, Rollator, Glucometer/supplies, SC, Agreeable to VNS, [facility], [facility] will need signed/completed.  CXR, CT head -, C/O dizziness for past  Weeks, will follow//KB                 Electronically signed by: Lora Bragg RN on 7/7/2018 at 12:54 PM
Notified, Keri Morejon, from [facility], VNS, that pt will DC today & is in need of start of care. Writer faxed, [facility] med list to [phone]. Instructed to call writer w/ any questions.
[facility]  DVT Prophylaxis and Vaccine Status  Work List  Mandatory for all patients      Patient must be on both Chemical prophylaxis and Mechanical prophylaxis.  If chemical/mechanical prophylaxis is not ordered, the physician must document a reason for not using prophylaxis     Chemical Prophylaxis  Is patient on chemical prophylaxis: Yes  If no chemical prophylaxis Is a order in for No Chemical VTE prophylaxisNo  If no was the physician notified not applicable      Mechanical Prophylaxis  Is patient on mechanical prophylaxis, intermittent pneumatic compression device: Yes  If no was the physician notified not applicable        Pneumonia Vaccine  Vaccine indicated:  Up-to-date  If indicated was the vaccine given: not applicable    Influenza Vaccine (applicable from October through March):  Vaccine indicated: Not indicated  If indicated was the vaccine given: not applicable    Patient Education  Education completed on DVT prophylaxis: yes
[P.O.:680; IV Piggyback:500]  Out: 1200 [Urine:1200]    Safety Concerns: At Risk for Falls    Impairments/Disabilities:      Vision and Hearing    Nutrition Therapy:  Current Nutrition Therapy:   - Oral Diet:  Carb Control 4 carbs/meal (1800kcals/day)    Routes of Feeding: Oral  Liquids: No Restrictions  Daily Fluid Restriction: no  Last Modified Barium Swallow with Video (Video Swallowing Test): not done    Treatments at the Time of Hospital Discharge:   Respiratory Treatments: na  Oxygen Therapy:  is not on home oxygen therapy. Ventilator:    - No ventilator support    Rehab Therapies: Physical Therapy and Occupational Therapy  Weight Bearing Status/Restrictions: No weight bearing restirctions  Other Medical Equipment (for information only, NOT a DME order):  Rollator, 4 wheeled Hover Round, SC  Other Treatments: Skilled Nursing Assessment Per Protocol. Medication education & monitoring.     Patient's personal belongings (please select all that are sent with patient):  Glasses, Hearing Aides bilateral, Dentures upper and lower,watch    RN SIGNATURE:  Electronically signed by June Mtz RN on 7/8/18 at 9:52 AM    CASE MANAGEMENT/SOCIAL WORK SECTION    Inpatient Status Date: 7/6/18    Readmission Risk Assessment Score:  Readmission Risk              Risk of Unplanned Readmission:        9             Discharging to Facility/ [address] [place] [address],[address]  Phone [phone]  Fax [phone]  Dialysis Facility (if applicable)   · Name:  · Address:  · Dialysis Schedule:  · Phone:  · Fax:    / signature: Electronically signed by Jaylen Baumann RN on 7/7/18 at 12:59 PM    PHYSICIAN SECTION    Prognosis: Fair    Condition at Discharge: Stable    Rehab Potential (if transferring to Rehab): Good    Recommended Labs or Other Treatments After Discharge:     Physician Certification: I certify the above information and transfer of Abdelrahman Zuly  is necessary for the continuing

## 2018-07-08 NOTE — PROGRESS NOTES
Resp 16   Ht 6' 3\" (1.905 m)   Wt 230 lb (104.3 kg)   SpO2 100%   BMI 28.75 kg/m²   24 hour intake/output:  Intake/Output Summary (Last 24 hours) at 07/08/18 1048  Last data filed at 07/08/18 0645   Gross per 24 hour   Intake             2015 ml   Output             2225 ml   Net             -210 ml     Last 3 weights: Wt Readings from Last 3 Encounters:   07/06/18 230 lb (104.3 kg)   03/06/18 232 lb (105.2 kg)   08/21/17 232 lb (105.2 kg)       Physical Examination:   General appearance - alert, well appearing, and in no distress  Mental status - alert, oriented to person, place, and time  Chest -   Heart -   Abdomen -   Neurological - }  Extremities -   Skin -      Labs:-    CBC:   Recent Labs      07/06/18   1630   WBC  10.1   HGB  14.0   PLT  327     BMP:  Recent Labs      07/06/18   1630   NA  137   K  4.4   CL  101   CO2  23   BUN  14   CREATININE  1.38*   GLUCOSE  93     Glucose:  Recent Labs      07/06/18   2053  07/07/18   0651  07/07/18   1138  07/07/18   1707  07/07/18   2046  07/08/18   0618  07/08/18   0646   POCGLU  181*  126*  159*  225*  159*  44*  75     HgbA1C: No results for input(s): LABA1C in the last 72 hours. INR: No results for input(s): INR in the last 72 hours. CARDIAC ENZYMES:No results for input(s): CKTOTAL, CKMB, CKMBINDEX, TROPONINI in the last 72 hours. BNP: No results for input(s): BNP in the last 72 hours. Lipids: No results for input(s): CHOL, TRIG, HDL, LDLCALC in the last 72 hours.     Invalid input(s): LDL  ABGs: No results found for: PH, PCO2, PO2, HCO3, O2SAT  Thyroid:   Lab Results   Component Value Date    TSH 3.86 04/04/2018      Urinalysis: Color, UA   Date Value Ref Range Status   07/06/2018 YELLOW YEL Final     pH, UA   Date Value Ref Range Status   07/06/2018 5.5 5.0 - 8.0 Final     Specific Gravity, UA   Date Value Ref Range Status   07/06/2018 1.010 1.000 - 1.030 Final     Protein, UA   Date Value Ref Range Status   07/06/2018 NEGATIVE NEG Final     RBC, UA Date Value Ref Range Status   07/22/2016 10 TO 20 0 - 2 /HPF Final     Bacteria, UA   Date Value Ref Range Status   07/22/2016 FEW (A) NONE Final     Comment:     Performed at [facility], [address] [place] [address] [phone]     Nitrite, Urine   Date Value Ref Range Status   07/06/2018 NEGATIVE NEG Final     WBC, UA   Date Value Ref Range Status   07/22/2016 5 TO 10 0 - 5 /HPF Final     Leukocyte Esterase, Urine   Date Value Ref Range Status   07/06/2018 NEGATIVE NEG Final     Yeast, UA   Date Value Ref Range Status   07/22/2016 NOT REPORTED NONE Final     Glucose, UA   Date Value Ref Range Status   04/18/2012 NEGATIVE NEG Final     Glucose, Ur   Date Value Ref Range Status   07/06/2018 NEGATIVE NEG Final     Bilirubin, Urine   Date Value Ref Range Status   04/18/2012 NEGATIVE NEG Final     Bilirubin Urine   Date Value Ref Range Status   07/06/2018 NEGATIVE NEG Final       CULTURES:    Current Rehabilitation Assessments:  PHYSICAL THERAPY:     OCCUPATIONAL THERAPY:     SPEECH:      Assessment:  Active Problems:    Dizziness  Resolved Problems:    * No resolved hospital problems. *      Plan:  1.  Discharge to home follow with NT as outpateinadama Martino MD             7/8/2018, 10:48 AM

## 2018-07-08 NOTE — H&P
HISTORY and [id number]         NAME:  Aram Xiong  MRN: [id number]   YOB: 1931   Date: 7/8/2018   Age: 80 y.o. Gender: male       COMPLAINT AND PRESENT HISTORY:               Aram Xiong is 80 y.o. , male, admitted because of dizziness. Patient has frequent sinusitis and recent acute bronchitis, patient has been coughing up thick green phlegm. patient has been feeling dizzy was on meclizine but states that this has been helping. History the mild strokes in the past.  Patient is also diabetic, diabetic neuropathy. No chest pain or palpitation no fever or chills. DIAGNOSTIC RESULTS   Radiology:   SINGLE XRAY VIEW OF THE CHEST       7/6/2018 4:17 pm       COMPARISON:   April 20, 2012       HISTORY:   ORDERING SYSTEM PROVIDED HISTORY: dizziness, bradycardia   TECHNOLOGIST PROVIDED HISTORY:   Reason for exam:->dizziness, bradycardia   Ordering Physician Provided Reason for Exam: Dizziness and bradycardia   Acuity: Acute   Type of Exam: Initial   Additional signs and symptoms: Dizziness and bradycardia       FINDINGS:   There is mild bibasilar interstitial infiltrates.  Cardiac silhouette   mediastinal shadow unremarkable.  Bony thorax intact.           Impression   Bibasilar interstitial infiltrates     CT OF THE HEAD WITHOUT CONTRAST  7/6/2018 4:23 pm       TECHNIQUE:   CT of the head was performed without the administration of intravenous   contrast. Dose modulation, iterative reconstruction, and/or weight based   adjustment of the mA/kV was utilized to reduce the radiation dose to as low   as reasonably achievable.       COMPARISON:   None.       CT brain 04/22/2013       HISTORY:   ORDERING SYSTEM PROVIDED HISTORY: dizziness / headache   TECHNOLOGIST PROVIDED HISTORY:   Ordering Physician Provided Reason for Exam: Pt c/o dizziness x 10 days and   increased weakness.    Acuity: Unknown   Type of Exam: Unknown       FINDINGS:   BRAIN/VENTRICLES: ischemic attack)     h/o 4,  2009, 2012, 2014, 2015    Hypertension     Kyphosis of thoracolumbar region     Neurogenic claudication     Numbness and tingling     feet    Onychomycosis     Prolonged emergence from general anesthesia     Spinal stenosis of lumbar region     Spondylolysis of lumbar region     Type II or unspecified type diabetes mellitus without mention of complication, not stated as uncontrolled     Wears glasses          SURGICAL HISTORY       Past Surgical History:   Procedure Laterality Date    CATARACT REMOVAL WITH IMPLANT Bilateral     COLONOSCOPY      CYSTOSCOPY      INGUINAL HERNIA REPAIR Bilateral     LUMBAR SPINE SURGERY      lumbar-6 screws and 2 bars    REVISION TOTAL KNEE ARTHROPLASTY Left     total of 3 d/t infections    TONSILLECTOMY      TOTAL HIP ARTHROPLASTY Right 07/13/2016    TOTAL KNEE ARTHROPLASTY Left     , 4 total surgeries, infection, I&D, spacer placed and removed       FAMILY HISTORY       Family History   Problem Relation Age of Onset    Emphysema Mother     Heart Attack Father     Coronary Art Dis Father     High Blood Pressure Father        SOCIAL HISTORY       Social History     Social History    Marital status:      Spouse name: N/A    Number of children: N/A    Years of education: N/A     Social History Main Topics    Smoking status: Former Smoker     Types: Cigarettes    Smokeless tobacco: Former User     Quit date: 7/6/1986      Comment: stopped 30 years ago     Alcohol use No    Drug use: No    Sexual activity: Not Asked     Other Topics Concern    None     Social History Narrative    None           REVIEW OF SYSTEMS      Allergies   Allergen Reactions    Bactrim [Sulfamethoxazole-Trimethoprim]      Unknown reaction    Other      baset    Sulfa Antibiotics Other (See Comments)     psoriasis    Sulfur      Causes pt to break out psorsias    Pcn [Penicillins] Hives     Also causes pt's throat to swell a little.        No current facility-administered medications on file prior to encounter. Current Outpatient Prescriptions on File Prior to Encounter   Medication Sig Dispense Refill    metoprolol tartrate (LOPRESSOR) 50 MG tablet       omeprazole (PRILOSEC) 20 MG delayed release capsule Take 20 mg by mouth Daily Indications: ([facility] Mail order)       budesonide-formoterol (SYMBICORT) 160-4.5 MCG/ACT AERO Inhale 1 puff into the lungs 2 times daily Indications: ([facility] Mail order)       LANTUS SOLOSTAR 100 UNIT/ML injection pen Inject 60 Units into the skin daily (Patient taking differently: Inject 50 Units into the skin daily Indications: ([facility] order) ) 5 Pen 3    clopidogrel (PLAVIX) 75 MG tablet Take 75 mg by mouth daily Indications: ([facility] Mail order)       loperamide (IMODIUM) 2 MG capsule Take 2 mg by mouth 4 times daily as needed.  augmented betamethasone dipropionate (DIPROLENE-AF) 0.05 % cream       montelukast (SINGULAIR) 10 MG tablet       meloxicam (MOBIC) 15 MG tablet Take 15 mg by mouth daily Indications: ([facility] Mail order)       BD ULTRA-FINE PEN NEEDLES 29G X 12.7MM MISC Inject 1 each into the skin daily       albuterol (PROVENTIL HFA;VENTOLIN HFA) 108 (90 BASE) MCG/ACT inhaler Inhale 2 puffs into the lungs every 6 hours as needed. General health:  [facility]. No fever or chills. Skin:  No itching, redness or rash. Head, eyes, ears, nose, throat:  Patient's frequent sinusitis, hearing loss. No headache, epistaxis, rhinorrhea, sore throat. Neck:  No pain, stiffness or masses. Cardiovascular/Respiratory system: Recent bronchitis episodes of shortness of breath and thick green phlegm No chest pain, palpitation. Gastrointestinal tract: No abdominal pain, nausea, vomiting, diarrhea or constipation. Genitourinary:  No burning on micturition.   No hesitancy, urgency, frequency or discoloration of urine. Locomotor:  No bone or joint pains. No swelling or deformities. Neuropsychiatric:  See HPI. GENERAL PHYSICAL EXAM:         Vitals: BP (!) 168/67   Pulse 60   Temp 97.3 °F (36.3 °C) (Axillary)   Resp 20   Ht 6' 3\" (1.905 m)   Wt 230 lb (104.3 kg)   SpO2 99%   BMI 28.75 kg/m²  Body mass index is 28.75 kg/m². GENERAL APPEARANCE:  Ian Barboza is 80 y.o.,  male, mildly obese, nourished, conscious, alert. Does not appear to be distress or pain at this time. SKIN:  Warm, dry, no cyanosis or jaundice. HEAD:  Normocephalic, atraumatic, no swelling or tenderness. EYES:  Pupils equal, reactive to light, Conjunctiva is clear, EOMs intact ki. eyelids WNL. EARS:  No discharge, Mild hearing loss. NOSE:  No rhinorrhea, epistaxis or septal deformity. THROAT:  Not congested. No ulceration bleeding or discharge. NECK:  No stiffness, trachea central.  No palpable masses or L.N.      CHEST:  Symmetrical and equal on expansion. HEART:  Regular rate and rhythm. S1 > S2, No audible murmurs or gallops. LUNGS:  Equal on expansion, audible breath sounds. No adventitious sounds. ABDOMEN:  Obese. Soft on palpation. No localized tenderness. No guarding or rigidity. No palpable organomegaly. LYMPHATICS:  No palpable Lymphadenopathy. LOCOMOTOR, BACK AND SPINE:  Chronic lumbar tenderness. EXTREMITIES:  Symmetrical, no pedal edema. Sanjuanitas sign negative. No discoloration or ulcerations. NEUROLOGIC:  The patient is conscious, alert, oriented. Diabetic neuropathy, No other apparent focal sensory deficits. History of TIAs and mild CVA with mild left upper limb weakness, No other motor deficits, muscle strength equal Ki. No facial droop, tongue protrudes centrally, no slurring of the speech. PROVISIONAL DIAGNOSES:      Active Problems:    Dizziness  Resolved Problems:    * No resolved hospital problems.  *      DWAYNE ADAMS

## 2018-07-08 NOTE — PROGRESS NOTES
Patient has family at bedside. All discharge instructions gone over with patient and family. All questions answered. Family has all patient belongings. Patient discharged via wheelchair. All medications returned from nurse  to pharmacy.

## 2018-07-08 NOTE — FLOWSHEET NOTE
Writer visited with patient's wife and daughter while he was walking with PT. They indicated he has HPOA and living will; they will bring to doctor's office on next visit to be scanned into his EMR. Patient came back into room and talked about going home later today. They appreciated SC visit. 07/08/18 1417   Encounter Summary   Services provided to: Patient and family together   Referral/Consult From: [facility] [address]; Children   Continue Visiting (7-8-18)   Complexity of Encounter Low   Length of Encounter 15 minutes   Spiritual Assessment Completed Yes   Spiritual/Temple   Type Spiritual support   Assessment Calm; Approachable   Intervention Active listening;Explored feelings, thoughts, concerns;Nurtured hope;Sustaining presence/ Ministry of presence; Discussed illness/injury and it's impact   Outcome Expressed gratitude;Engaged in conversation;Expressed feelings/needs/concerns;Coping   Advance Directives (For Healthcare)   Healthcare Directive Yes, patient has an advance directive for healthcare treatment   Copy in Chart No, copy requested from family

## 2018-08-04 ENCOUNTER — APPOINTMENT (OUTPATIENT)
Dept: CT IMAGING | Age: 83
DRG: 068 | End: 2018-08-04
Payer: MEDICARE

## 2018-08-04 ENCOUNTER — APPOINTMENT (OUTPATIENT)
Dept: GENERAL RADIOLOGY | Age: 83
DRG: 068 | End: 2018-08-04
Payer: MEDICARE

## 2018-08-04 ENCOUNTER — HOSPITAL ENCOUNTER (INPATIENT)
Age: 83
LOS: 3 days | Discharge: NURSING, REHAB OR LONG-TERM CARE | DRG: 068 | End: 2018-08-07
Attending: EMERGENCY MEDICINE | Admitting: FAMILY MEDICINE
Payer: MEDICARE

## 2018-08-04 DIAGNOSIS — R29.6 FREQUENT FALLS: ICD-10-CM

## 2018-08-04 DIAGNOSIS — I65.21 STENOSIS OF RIGHT INTERNAL CAROTID ARTERY: Primary | ICD-10-CM

## 2018-08-04 DIAGNOSIS — S70.01XA CONTUSION OF RIGHT HIP, INITIAL ENCOUNTER: ICD-10-CM

## 2018-08-04 DIAGNOSIS — R42 DIZZINESS: ICD-10-CM

## 2018-08-04 LAB
ABSOLUTE EOS #: 0.5 K/UL (ref 0–0.4)
ABSOLUTE IMMATURE GRANULOCYTE: ABNORMAL K/UL (ref 0–0.3)
ABSOLUTE LYMPH #: 2.1 K/UL (ref 1–4.8)
ABSOLUTE MONO #: 0.9 K/UL (ref 0.1–1.3)
ALBUMIN SERPL-MCNC: 3.8 G/DL (ref 3.5–5.2)
ALBUMIN/GLOBULIN RATIO: ABNORMAL (ref 1–2.5)
ALP BLD-CCNC: 60 U/L (ref 40–129)
ALT SERPL-CCNC: 10 U/L (ref 5–41)
ANION GAP SERPL CALCULATED.3IONS-SCNC: 11 MMOL/L (ref 9–17)
AST SERPL-CCNC: 14 U/L
BASOPHILS # BLD: 1 % (ref 0–2)
BASOPHILS ABSOLUTE: 0 K/UL (ref 0–0.2)
BILIRUB SERPL-MCNC: 0.86 MG/DL (ref 0.3–1.2)
BUN BLDV-MCNC: 13 MG/DL (ref 8–23)
BUN/CREAT BLD: ABNORMAL (ref 9–20)
CALCIUM SERPL-MCNC: 8.6 MG/DL (ref 8.6–10.4)
CHLORIDE BLD-SCNC: 102 MMOL/L (ref 98–107)
CO2: 23 MMOL/L (ref 20–31)
CREAT SERPL-MCNC: 1.29 MG/DL (ref 0.7–1.2)
DIFFERENTIAL TYPE: ABNORMAL
EOSINOPHILS RELATIVE PERCENT: 5 % (ref 0–4)
GFR AFRICAN AMERICAN: >60 ML/MIN
GFR NON-AFRICAN AMERICAN: 53 ML/MIN
GFR SERPL CREATININE-BSD FRML MDRD: ABNORMAL ML/MIN/{1.73_M2}
GFR SERPL CREATININE-BSD FRML MDRD: ABNORMAL ML/MIN/{1.73_M2}
GLUCOSE BLD-MCNC: 84 MG/DL (ref 70–99)
HCT VFR BLD CALC: 38.1 % (ref 41–53)
HEMOGLOBIN: 12.6 G/DL (ref 13.5–17.5)
IMMATURE GRANULOCYTES: ABNORMAL %
LYMPHOCYTES # BLD: 24 % (ref 24–44)
MCH RBC QN AUTO: 28.8 PG (ref 26–34)
MCHC RBC AUTO-ENTMCNC: 33.1 G/DL (ref 31–37)
MCV RBC AUTO: 87 FL (ref 80–100)
MONOCYTES # BLD: 11 % (ref 1–7)
NRBC AUTOMATED: ABNORMAL PER 100 WBC
PDW BLD-RTO: 14.9 % (ref 11.5–14.9)
PLATELET # BLD: 262 K/UL (ref 150–450)
PLATELET ESTIMATE: ABNORMAL
PMV BLD AUTO: 7.6 FL (ref 6–12)
POTASSIUM SERPL-SCNC: 4.9 MMOL/L (ref 3.7–5.3)
RBC # BLD: 4.38 M/UL (ref 4.5–5.9)
RBC # BLD: ABNORMAL 10*6/UL
SEG NEUTROPHILS: 59 % (ref 36–66)
SEGMENTED NEUTROPHILS ABSOLUTE COUNT: 5.3 K/UL (ref 1.3–9.1)
SODIUM BLD-SCNC: 136 MMOL/L (ref 135–144)
TOTAL PROTEIN: 6.9 G/DL (ref 6.4–8.3)
TROPONIN INTERP: NORMAL
TROPONIN T: <0.03 NG/ML
WBC # BLD: 8.9 K/UL (ref 3.5–11)
WBC # BLD: ABNORMAL 10*3/UL

## 2018-08-04 PROCEDURE — 2580000003 HC RX 258: Performed by: FAMILY MEDICINE

## 2018-08-04 PROCEDURE — 36415 COLL VENOUS BLD VENIPUNCTURE: CPT

## 2018-08-04 PROCEDURE — 73610 X-RAY EXAM OF ANKLE: CPT

## 2018-08-04 PROCEDURE — 71046 X-RAY EXAM CHEST 2 VIEWS: CPT

## 2018-08-04 PROCEDURE — 84484 ASSAY OF TROPONIN QUANT: CPT

## 2018-08-04 PROCEDURE — 1200000000 HC SEMI PRIVATE

## 2018-08-04 PROCEDURE — 73700 CT LOWER EXTREMITY W/O DYE: CPT

## 2018-08-04 PROCEDURE — 80053 COMPREHEN METABOLIC PANEL: CPT

## 2018-08-04 PROCEDURE — 85025 COMPLETE CBC W/AUTO DIFF WBC: CPT

## 2018-08-04 PROCEDURE — 99285 EMERGENCY DEPT VISIT HI MDM: CPT

## 2018-08-04 PROCEDURE — 70450 CT HEAD/BRAIN W/O DYE: CPT

## 2018-08-04 PROCEDURE — 82947 ASSAY GLUCOSE BLOOD QUANT: CPT

## 2018-08-04 PROCEDURE — 6360000002 HC RX W HCPCS: Performed by: FAMILY MEDICINE

## 2018-08-04 PROCEDURE — 72131 CT LUMBAR SPINE W/O DYE: CPT

## 2018-08-04 RX ORDER — SODIUM CHLORIDE 0.9 % (FLUSH) 0.9 %
10 SYRINGE (ML) INJECTION PRN
Status: DISCONTINUED | OUTPATIENT
Start: 2018-08-04 | End: 2018-08-07 | Stop reason: HOSPADM

## 2018-08-04 RX ORDER — ACETAMINOPHEN 325 MG/1
650 TABLET ORAL EVERY 4 HOURS PRN
Status: DISCONTINUED | OUTPATIENT
Start: 2018-08-04 | End: 2018-08-07 | Stop reason: HOSPADM

## 2018-08-04 RX ORDER — DEXTROSE MONOHYDRATE 50 MG/ML
100 INJECTION, SOLUTION INTRAVENOUS PRN
Status: DISCONTINUED | OUTPATIENT
Start: 2018-08-04 | End: 2018-08-07 | Stop reason: HOSPADM

## 2018-08-04 RX ORDER — NICOTINE POLACRILEX 4 MG
15 LOZENGE BUCCAL PRN
Status: DISCONTINUED | OUTPATIENT
Start: 2018-08-04 | End: 2018-08-07 | Stop reason: HOSPADM

## 2018-08-04 RX ORDER — DEXTROSE MONOHYDRATE 25 G/50ML
12.5 INJECTION, SOLUTION INTRAVENOUS PRN
Status: DISCONTINUED | OUTPATIENT
Start: 2018-08-04 | End: 2018-08-07 | Stop reason: HOSPADM

## 2018-08-04 RX ORDER — SODIUM CHLORIDE 0.9 % (FLUSH) 0.9 %
10 SYRINGE (ML) INJECTION EVERY 12 HOURS SCHEDULED
Status: DISCONTINUED | OUTPATIENT
Start: 2018-08-04 | End: 2018-08-07 | Stop reason: HOSPADM

## 2018-08-04 RX ADMIN — Medication 10 ML: at 21:26

## 2018-08-04 RX ADMIN — ENOXAPARIN SODIUM 40 MG: 40 INJECTION SUBCUTANEOUS at 14:29

## 2018-08-04 ASSESSMENT — ENCOUNTER SYMPTOMS
VOMITING: 0
COUGH: 0
BACK PAIN: 1
ABDOMINAL PAIN: 0
NAUSEA: 0

## 2018-08-04 ASSESSMENT — PAIN SCALES - GENERAL
PAINLEVEL_OUTOF10: 7
PAINLEVEL_OUTOF10: 8

## 2018-08-04 NOTE — ED NOTES
[facility]     Neeru Villa RN  08/04/18 2033

## 2018-08-04 NOTE — ED PROVIDER NOTES
[address]  eMERGENCY dEPARTMENT eNCOUnter      Pt Name: Arthur Cardenas  MRN: [id number]  [place] 3/12/1931  Date of evaluation: 8/4/18      CHIEF COMPLAINT       Chief Complaint   Patient presents with    Hip Pain     right; x1 week    Dizziness     HISTORY OF PRESENT ILLNESS   HPI 80 y.o. male presents with falls right hip pain and dizziness. The patient states that he has been having chronic dizziness for several months. He's been treated for vertigo with minimal relief, he had carotid Doppler studies performed on April 4, 2018, that showed 70-99% stenosis of the right internal carotid arteries. He scheduled to see Dr. Michele Tobar, but has not had an appointment yet. He continues to have dizziness particularly when standing or walking. He's been very unsteady on his feet and he continues to have frequent falls. His dizziness is progressively worsening. On Tuesday he fell onto his right side. He's been having pain over his right lateral hip ever since that time. He is able to walk with assistance, but he has a right-sided hip pain. Also has some mild pain in his lumbar back. His pain is 7 out of 10 at the hip. Worsened with walking. Review of Systems   Constitutional: Negative for chills and fever. HENT: Negative for congestion. Eyes: Negative for visual disturbance. Respiratory: Negative for cough. Cardiovascular: Negative for chest pain. Gastrointestinal: Negative for abdominal pain, nausea and vomiting. Musculoskeletal: Positive for arthralgias, back pain and gait problem. Negative for neck pain. Skin: Positive for rash. Neurological: Positive for dizziness. Negative for numbness.      PAST MEDICAL HISTORY     Past Medical History:   Diagnosis Date    Arthritis     Asthma     Cystitis     Diabetic neuropathy ([facility] [address]     Diarrhea     GERD (gastroesophageal reflux disease)     H/O acute bronchitis     [place] (hard of hearing)     Hx-TIA (transient ischemic attack)     h/o 4,  2009, 2012, 2014, 2015    Hypertension     Kyphosis of thoracolumbar region     Neurogenic claudication     Numbness and tingling     feet    Onychomycosis     Prolonged emergence from general anesthesia     Spinal stenosis of lumbar region     Spondylolysis of lumbar region     Type II or unspecified type diabetes mellitus without mention of complication, not stated as uncontrolled     Wears glasses        SURGICAL HISTORY       Past Surgical History:   Procedure Laterality Date    CATARACT REMOVAL WITH IMPLANT Bilateral     COLONOSCOPY      CYSTOSCOPY      INGUINAL HERNIA REPAIR Bilateral     LUMBAR SPINE SURGERY      lumbar-6 screws and 2 bars    REVISION TOTAL KNEE ARTHROPLASTY Left     total of 3 d/t infections    TONSILLECTOMY      TOTAL HIP ARTHROPLASTY Right 07/13/2016    TOTAL KNEE ARTHROPLASTY Left     , 4 total surgeries, infection, I&D, spacer placed and removed       CURRENT MEDICATIONS       Current Discharge Medication List      CONTINUE these medications which have NOT CHANGED    Details   metoprolol tartrate (LOPRESSOR) 50 MG tablet       omeprazole (PRILOSEC) 20 MG delayed release capsule Take 20 mg by mouth Daily Indications: ([facility] Mail order)       LANTUS SOLOSTAR 100 UNIT/ML injection pen Inject 60 Units into the skin daily  Qty: 5 Pen, Refills: 3      clopidogrel (PLAVIX) 75 MG tablet Take 75 mg by mouth daily Indications: ([facility] Mail order)       loperamide (IMODIUM) 2 MG capsule Take 2 mg by mouth 4 times daily as needed.         meclizine (ANTIVERT) 12.5 MG tablet Take 12.5 mg by mouth 3 times daily as needed      phenylephrine (MARTÍN-SYNEPHRINE) 0.5 % nasal spray 1 spray by Nasal route every 4 hours as needed for Congestion      Doxylamine-Phenylephrine-APAP (VICKS SINEX DAYTIME/NIGHTTIME PO) Take 1 capsule by mouth 2 times daily      augmented betamethasone dipropionate (DIPROLENE-AF) 0.05 % cream       montelukast (SINGULAIR) 10 MG tablet meloxicam (MOBIC) 15 MG tablet Take 15 mg by mouth daily Indications: ([facility] Mail order)       budesonide-formoterol (SYMBICORT) 160-4.5 MCG/ACT AERO Inhale 1 puff into the lungs 2 times daily Indications: ([facility] Mail order)       BD ULTRA-FINE PEN NEEDLES 29G X 12.7MM MISC Inject 1 each into the skin daily       albuterol (PROVENTIL HFA;VENTOLIN HFA) 108 (90 BASE) MCG/ACT inhaler Inhale 2 puffs into the lungs every 6 hours as needed. ALLERGIES     is allergic to bactrim [sulfamethoxazole-trimethoprim]; other; sulfa antibiotics; sulfur; and pcn [penicillins]. FAMILY HISTORY     indicated that his mother is . He indicated that his father is . SOCIAL HISTORY      reports that he has quit smoking. His smoking use included Cigarettes. He quit smokeless tobacco use about 32 years ago. He reports that he does not drink alcohol or use drugs. PHYSICAL EXAM     INITIAL VITALS: BP (!) 148/48   Pulse 65   Temp 97.6 °F (36.4 °C) (Oral)   Resp 16   Ht 6' 2\" (1.88 m)   Wt 220 lb (99.8 kg)   SpO2 98%   BMI 28.25 kg/m²      Gen.: NAD   Head: Normocephalic, atraumatic  Eye: Pupils equal round reactive to light, no conjunctivitis, no nystagmus   Heart: Regular rate and rhythm no murmurs  Lungs: Clear to auscultation bilaterally, no respiratory distress  Chest wall: No crepitus, no tenderness palpation  Abdomen: Soft, nontender, nondistended, with no peritoneal signs  Neurologic: Patient is alert and oriented x3,   motor and sensation is intact in all 4 extremities  MSK: Tenderness over the right greater trochanter and the right ankle lateral malleolus. Extremities: Patient is bruising over the right thigh and the right lateral lower leg. MEDICAL DECISION MAKING:     MDM  Elderly male with severe internal carotid stenosis presenting with multiple episodes of pre-syncope/dizziness and falling with hip pain and back pain.   We'll get CT scans of the hip and normal limits   TROPONIN       EMERGENCY DEPARTMENT COURSE:   Vitals:    Vitals:    08/04/18 1011 08/04/18 1159 08/04/18 1333 08/04/18 1405   BP: (!) 155/71  (!) 160/73 (!) 148/48   Pulse: 55  69 65   Resp: 20 16 16 16   Temp: 97.8 °F (36.6 °C)  97.3 °F (36.3 °C) 97.6 °F (36.4 °C)   TempSrc: Oral  Oral Oral   SpO2: 96% 98% 97% 98%   Weight: 220 lb (99.8 kg)      Height: 6' 2\" (1.88 m)          The patient was given the following medications while in the emergency department:  Orders Placed This Encounter   Medications    sodium chloride flush 0.9 % injection 10 mL    sodium chloride flush 0.9 % injection 10 mL    acetaminophen (TYLENOL) tablet 650 mg    enoxaparin (LOVENOX) injection 40 mg     -------------------------  CRITICAL CARE:   CONSULTS: IP CONSULT TO PRIMARY CARE PROVIDER  IP CONSULT TO VASCULAR SURGERY  PROCEDURES: Procedures     FINAL IMPRESSION      1. Stenosis of right internal carotid artery    2. Dizziness    3. Frequent falls    4.  Contusion of right hip, initial encounter          DISPOSITION/PLAN   DISPOSITION Admitted 08/04/2018 12:54:55 PM      PATIENT REFERRED TO:  Neetu Erickson [address]  [phone]            DISCHARGE MEDICATIONS:  Current Discharge Medication List            Miroslava Santiago MD  Attending Emergency Physician                      Miroslava Santiago MD  08/04/18 8558

## 2018-08-04 NOTE — ED NOTES
Bed: 11  Expected date:   Expected time:   Means of arrival:   Comments:  [facility]  08/04/18 1001

## 2018-08-05 LAB — GLUCOSE BLD-MCNC: 209 MG/DL (ref 75–110)

## 2018-08-05 PROCEDURE — 2580000003 HC RX 258: Performed by: FAMILY MEDICINE

## 2018-08-05 PROCEDURE — 6370000000 HC RX 637 (ALT 250 FOR IP): Performed by: FAMILY MEDICINE

## 2018-08-05 PROCEDURE — 6360000002 HC RX W HCPCS: Performed by: FAMILY MEDICINE

## 2018-08-05 PROCEDURE — 97161 PT EVAL LOW COMPLEX 20 MIN: CPT

## 2018-08-05 PROCEDURE — 97116 GAIT TRAINING THERAPY: CPT

## 2018-08-05 PROCEDURE — G8978 MOBILITY CURRENT STATUS: HCPCS

## 2018-08-05 PROCEDURE — G8979 MOBILITY GOAL STATUS: HCPCS

## 2018-08-05 PROCEDURE — 1200000000 HC SEMI PRIVATE

## 2018-08-05 PROCEDURE — 82947 ASSAY GLUCOSE BLOOD QUANT: CPT

## 2018-08-05 RX ORDER — METOPROLOL TARTRATE 50 MG/1
50 TABLET, FILM COATED ORAL DAILY
Status: DISCONTINUED | OUTPATIENT
Start: 2018-08-05 | End: 2018-08-07 | Stop reason: HOSPADM

## 2018-08-05 RX ORDER — MECLIZINE HYDROCHLORIDE 25 MG/1
12.5 TABLET ORAL 3 TIMES DAILY PRN
Status: DISCONTINUED | OUTPATIENT
Start: 2018-08-05 | End: 2018-08-07 | Stop reason: HOSPADM

## 2018-08-05 RX ADMIN — ENOXAPARIN SODIUM 40 MG: 40 INJECTION SUBCUTANEOUS at 09:21

## 2018-08-05 RX ADMIN — METOPROLOL TARTRATE 50 MG: 50 TABLET ORAL at 20:28

## 2018-08-05 RX ADMIN — Medication 10 ML: at 09:22

## 2018-08-05 RX ADMIN — MECLIZINE HYDROCHLORIDE 12.5 MG: 25 TABLET ORAL at 20:33

## 2018-08-05 RX ADMIN — Medication 10 ML: at 20:28

## 2018-08-05 ASSESSMENT — PAIN SCALES - GENERAL: PAINLEVEL_OUTOF10: 0

## 2018-08-05 NOTE — CARE COORDINATION
CASE MANAGEMENT NOTE:    Admission Date:  8/4/2018 Addis Frank is a 80 y.o.  male    Admitted for : Internal carotid artery stenosis, right [I65.21]    Met with:  Patient    PCP:  Dr Kalee Coto:  Aly Gore:  independently at home with use of DME             Current Services PTA:  Yes    Is patient agreeable to VNS: Yes    Freedom of choice provided: Yes         VNS chosen:  Yes- resume [address]    DME:  shower chair, rollator, 4 wheeled hover round, and glucometer    Home Oxygen: No    Nebulizer: No    Supplier: N/A    Potential Assistance Needed: Yes    SNF needed: Continue to follow    Pharmacy:  [facility]       Does Patient want to use MEDS to BEDS? No    Family Members/Caregivers that pt would like involved in their care:    Yes    If yes, list name here:  Sara Andrade and Brain    Transportation Provider:  Family/ Neighbors                      Discharge Plan:  8[facility]/2018 [facility]; From single story home with spouse- Stated independent with use of DME and family/ neighbors provide transportation; DME- shower chair, rollator, 4 wheeled hover round, and glucometer; Current with [facility] and wants resumed; Admitted with right internal carotid artery stenosis- vascular surgery consulted; Physical Therapy recommending- Home with Home health PT, Outpatient PT, Home with assist PRN; JONATHAN NEEDS SIGNED/COMPLETED//GARRICK              Electronically signed by:  Ashley Adams RN on 8/5/2018 at 11:34 AM

## 2018-08-05 NOTE — PROGRESS NOTES
Physical Therapy    Facility/Department: [facility] MED SURG  Initial Assessment    NAME: Inga Beal  : 3/12/1931  MRN: [id number]    Date of Service: 2018    Discharge Recommendations:  Home with Home health PT, Outpatient PT, Home with assist PRN   PT Equipment Recommendations  Equipment Needed: No    Patient Diagnosis(es): The primary encounter diagnosis was Stenosis of right internal carotid artery. Diagnoses of Dizziness, Frequent falls, and Contusion of right hip, initial encounter were also pertinent to this visit. has a past medical history of Arthritis; Asthma; Cystitis; Diabetic neuropathy ([address] Diarrhea; Dizziness; GERD (gastroesophageal reflux disease); H/O acute bronchitis; [place] (hard of hearing); Hx-TIA (transient ischemic attack); Hypertension; Kyphosis of thoracolumbar region; Neurogenic claudication; Numbness and tingling; Onychomycosis; Prolonged emergence from general anesthesia; Spinal stenosis of lumbar region; Spondylolysis of lumbar region; Type II or unspecified type diabetes mellitus without mention of complication, not stated as uncontrolled; and Wears glasses. has a past surgical history that includes Tonsillectomy; Cystocopy; Colonoscopy; Total knee arthroplasty (Left); Inguinal hernia repair (Bilateral); Cataract removal with implant (Bilateral); Lumbar spine surgery; Revision total knee arthroplasty (Left); and Total hip arthroplasty (Right, 2016).     Restrictions  Restrictions/Precautions  Restrictions/Precautions: Fall Risk  Implants present? : Metal implants  Position Activity Restriction  Other position/activity restrictions: lumbar fusion,RTHR/LTKR  Vision/Hearing  Vision: Impaired  Vision Exceptions: Wears glasses at all times  Hearing: Exceptions to [facility]  Hearing Exceptions: Bilateral hearing aid     Subjective  General  Chart Reviewed: Yes  Patient assessed for rehabilitation services?: Yes  Additional Pertinent Hx: admitted 18 due to pain R hip and dizziness  Family / Caregiver Present: Yes  Referring Practitioner: Dr Tremaine Santo  Referral Date : 08/05/18  Diagnosis: R internal carotid artery stenosis  Follows Commands: Within Functional Limits  Subjective  Subjective: no complains of pain but very weak  Pain Screening  Patient Currently in Pain: No  Vital Signs  Patient Currently in Pain: No       Orientation  Orientation  Overall Orientation Status: Within Normal Limits    Social/Functional History  Social/Functional History  Lives With: Spouse  Type of Home: House  Home Layout: One level  Home Access: Ramped entrance  Home Equipment: 4 wheeled walker, Wheelchair-electric  Receives Help From: Family  ADL Assistance: Needs assistance  [place]ath: Moderate assistance  Ambulation Assistance: Independent  Transfer Assistance: Needs assistance  Active : No  Mode of Transportation: Family  Occupation: Retired  Objective  AROM RLE (degrees)  RLE AROM: WFL  AROM LLE (degrees)  LLE AROM : WFL  AROM RUE (degrees)  RUE AROM : WFL  AROM LUE (degrees)  LUE AROM : WFL  Strength RLE  Comment: hip 3/5 knee4-/5 ankle 5/5  Strength LLE  Comment: hip 3-/5 knee 4-/5 ankle 5/5  Strength RUE  Comment: 4-/5  Strength LUE  Comment: 4-/5  Bed mobility  Bridging: Independent  Rolling to Left: Independent  Rolling to Right: Independent  Supine to Sit: Minimal assistance  Sit to Supine: Contact guard assistance  Scooting: Independent  Transfers  Sit to Stand: Stand by assistance  Stand to sit: Stand by assistance  Bed to Chair: Stand by assistance  Stand Pivot Transfers: Stand by assistance  Comment: elevated bed for easy sit to stand  Ambulation  Ambulation?: Yes  Ambulation 1  Surface: level tile  Device: Rolling Walker  Assistance: Contact guard assistance  Distance: 20ft  Stairs/Curb  Stairs?: No    Assessment   Body structures, Functions, Activity limitations: Decreased functional mobility ; Decreased ADL status; Decreased strength  Assessment: weakness BLE limiting

## 2018-08-05 NOTE — PLAN OF CARE
Problem: Falls - Risk of:  Goal: Absence of physical injury  Absence of physical injury   Outcome: Ongoing  No falls noted    Problem: Safety:  Goal: Free from accidental physical injury  Free from accidental physical injury   Outcome: Ongoing

## 2018-08-05 NOTE — PROGRESS NOTES
ml     Last 3 weights: Wt Readings from Last 3 Encounters:   08/05/18 219 lb 9.3 oz (99.6 kg)   07/06/18 230 lb (104.3 kg)   03/06/18 232 lb (105.2 kg)       Physical Examination:   General appearance - alert, well appearing, and in no distress  Mental status - alert, oriented to person, place, and time  Chest -   Heart -   Abdomen -   Neurological - notably dizzy even wit hsupination,  Neurologically seems intact }  Extremities - no edema   Skin -      Labs:-    CBC:   Recent Labs      08/04/18   1100   WBC  8.9   HGB  12.6*   PLT  262     BMP:  Recent Labs      08/04/18   1100   NA  136   K  4.9   CL  102   CO2  23   BUN  13   CREATININE  1.29*   GLUCOSE  84     Glucose:No results for input(s): POCGLU in the last 72 hours. HgbA1C: No results for input(s): LABA1C in the last 72 hours. INR: No results for input(s): INR in the last 72 hours. CARDIAC ENZYMES:No results for input(s): CKTOTAL, CKMB, CKMBINDEX, TROPONINI in the last 72 hours. BNP: No results for input(s): BNP in the last 72 hours. Lipids: No results for input(s): CHOL, TRIG, HDL, LDLCALC in the last 72 hours.     Invalid input(s): LDL  ABGs: No results found for: PH, PCO2, PO2, HCO3, O2SAT  Thyroid:   Lab Results   Component Value Date    TSH 3.86 04/04/2018      Urinalysis: Color, UA   Date Value Ref Range Status   07/06/2018 YELLOW YEL Final     pH, UA   Date Value Ref Range Status   07/06/2018 5.5 5.0 - 8.0 Final     Specific Gravity, UA   Date Value Ref Range Status   07/06/2018 1.010 1.000 - 1.030 Final     Protein, UA   Date Value Ref Range Status   07/06/2018 NEGATIVE NEG Final     RBC, UA   Date Value Ref Range Status   07/22/2016 10 TO 20 0 - 2 /HPF Final     Bacteria, UA   Date Value Ref Range Status   07/22/2016 FEW (A) NONE Final     Comment:     Performed at [address] [place] [address], [address] [place] [address] [phone]     Nitrite, Urine   Date Value Ref Range Status   07/06/2018 NEGATIVE NEG Final     WBC, UA   Date Value Ref Range Status   07/22/2016 5 TO 10 0 - 5 /HPF Final     Leukocyte Esterase, Urine   Date Value Ref Range Status   07/06/2018 NEGATIVE NEG Final     Yeast, UA   Date Value Ref Range Status   07/22/2016 NOT REPORTED NONE Final     Glucose, UA   Date Value Ref Range Status   04/18/2012 NEGATIVE NEG Final     Glucose, Ur   Date Value Ref Range Status   07/06/2018 NEGATIVE NEG Final     Bilirubin, Urine   Date Value Ref Range Status   04/18/2012 NEGATIVE NEG Final     Bilirubin Urine   Date Value Ref Range Status   07/06/2018 NEGATIVE NEG Final       CULTURES:    Current Rehabilitation Assessments:  PHYSICAL THERAPY:     OCCUPATIONAL THERAPY:     SPEECH:      Assessment:  Active Problems:    Internal carotid artery stenosis, right  Resolved Problems:    * No resolved hospital problems. *  dizziness     Plan:  1. Await vascular input  2.  Fall risk     Sarah Dukes MD             8/5/2018, 11:20 AM

## 2018-08-06 LAB
GLUCOSE BLD-MCNC: 154 MG/DL (ref 75–110)
LV EF: 65 %
LVEF MODALITY: NORMAL

## 2018-08-06 PROCEDURE — 1200000000 HC SEMI PRIVATE

## 2018-08-06 PROCEDURE — 2580000003 HC RX 258: Performed by: FAMILY MEDICINE

## 2018-08-06 PROCEDURE — 82947 ASSAY GLUCOSE BLOOD QUANT: CPT

## 2018-08-06 PROCEDURE — 93306 TTE W/DOPPLER COMPLETE: CPT

## 2018-08-06 PROCEDURE — 93880 EXTRACRANIAL BILAT STUDY: CPT

## 2018-08-06 PROCEDURE — 6370000000 HC RX 637 (ALT 250 FOR IP): Performed by: FAMILY MEDICINE

## 2018-08-06 PROCEDURE — 6360000002 HC RX W HCPCS: Performed by: FAMILY MEDICINE

## 2018-08-06 RX ADMIN — Medication 10 ML: at 21:50

## 2018-08-06 RX ADMIN — INSULIN LISPRO 1 UNITS: 100 INJECTION, SOLUTION INTRAVENOUS; SUBCUTANEOUS at 21:47

## 2018-08-06 RX ADMIN — METOPROLOL TARTRATE 50 MG: 50 TABLET ORAL at 08:29

## 2018-08-06 RX ADMIN — ENOXAPARIN SODIUM 40 MG: 40 INJECTION SUBCUTANEOUS at 21:47

## 2018-08-06 RX ADMIN — MECLIZINE HYDROCHLORIDE 12.5 MG: 25 TABLET ORAL at 08:29

## 2018-08-06 RX ADMIN — Medication 10 ML: at 08:30

## 2018-08-06 ASSESSMENT — PAIN SCALES - GENERAL: PAINLEVEL_OUTOF10: 0

## 2018-08-06 NOTE — H&P
[phone]            Nitrite, Urine   Date Value Ref Range Status   07/06/2018 NEGATIVE NEG Final            WBC, UA   Date Value Ref Range Status   07/22/2016 5 TO 10 0 - 5 /HPF Final            Leukocyte Esterase, Urine   Date Value Ref Range Status   07/06/2018 NEGATIVE NEG Final            Yeast, UA   Date Value Ref Range Status   07/22/2016 NOT REPORTED NONE Final      Glucose, UA   Date Value Ref Range Status   04/18/2012 NEGATIVE NEG Final            Glucose, Ur   Date Value Ref Range Status   07/06/2018 NEGATIVE NEG Final            Bilirubin, Urine   Date Value Ref Range Status   04/18/2012 NEGATIVE NEG Final            Bilirubin Urine   Date Value Ref Range Status   07/06/2018 NEGATIVE NEG Final         CULTURES:     Current Rehabilitation Assessments:  PHYSICAL THERAPY:     OCCUPATIONAL THERAPY:     SPEECH:       Assessment:  Active Problems:    Internal carotid artery stenosis, right  Resolved Problems:    * No resolved hospital problems. *  dizziness      Plan:  1. Await vascular input  2.  Fall risk      Francisco J Moser MD             8/5/2018, 11:20 AM

## 2018-08-06 NOTE — CARE COORDINATION
ONGOING DISCHARGE PLAN:    Spoke with patient regarding discharge plan and patient confirms that plan is to return home  Here with rt internal artery stenosis, dizziness and frequent falls  Awaiting vascular input    Will continue to follow for additional discharge needs.     Electronically signed by Jayme Chow RN on 8/6/2018 at 12:07 PM

## 2018-08-06 NOTE — PROGRESS NOTES
The patient is symptomatic with right sided carotid occlusive disease with hypertension, ataxia and intractable dizziness . He qualifies as inpatient admission.

## 2018-08-06 NOTE — PROGRESS NOTES
weights: Wt Readings from Last 3 Encounters:   08/05/18 219 lb 9.3 oz (99.6 kg)   07/06/18 230 lb (104.3 kg)   03/06/18 232 lb (105.2 kg)       Physical Examination:   General appearance - alert, well appearing, and in no distress  Mental status - alert, oriented to person, place, and time  Chest - clear to auscultation, no wheezes, rales or rhonchi, symmetric air entry  Heart - carotid bruit noted right  Abdomen - soft, nontender, nondistended, no masses or organomegaly  Neurological - alert, oriented, normal speech, no focal findings or movement disorder noted}  Extremities - peripheral pulses normal, no pedal edema, no clubbing or cyanosis  Skin - normal coloration and turgor, no rashes, no suspicious skin lesions noted     Labs:-    CBC:   Recent Labs      08/04/18   1100   WBC  8.9   HGB  12.6*   PLT  262     BMP:  Recent Labs      08/04/18   1100   NA  136   K  4.9   CL  102   CO2  23   BUN  13   CREATININE  1.29*   GLUCOSE  84     Glucose:  Recent Labs      08/05/18   2031   POCGLU  209*     HgbA1C: No results for input(s): LABA1C in the last 72 hours. INR: No results for input(s): INR in the last 72 hours. CARDIAC ENZYMES:No results for input(s): CKTOTAL, CKMB, CKMBINDEX, TROPONINI in the last 72 hours. BNP: No results for input(s): BNP in the last 72 hours. Lipids: No results for input(s): CHOL, TRIG, HDL, LDLCALC in the last 72 hours.     Invalid input(s): LDL  ABGs: No results found for: PH, PCO2, PO2, HCO3, O2SAT  Thyroid:   Lab Results   Component Value Date    TSH 3.86 04/04/2018      Urinalysis: Color, UA   Date Value Ref Range Status   07/06/2018 YELLOW YEL Final     pH, UA   Date Value Ref Range Status   07/06/2018 5.5 5.0 - 8.0 Final     Specific Gravity, UA   Date Value Ref Range Status   07/06/2018 1.010 1.000 - 1.030 Final     Protein, UA   Date Value Ref Range Status   07/06/2018 NEGATIVE NEG Final     RBC, UA   Date Value Ref Range Status   07/22/2016 10 TO 20 0 - 2 /HPF Final Bacteria, UA   Date Value Ref Range Status   07/22/2016 FEW (A) NONE Final     Comment:     Performed at [facility], [address] [place] [address] [phone]     Nitrite, Urine   Date Value Ref Range Status   07/06/2018 NEGATIVE NEG Final     WBC, UA   Date Value Ref Range Status   07/22/2016 5 TO 10 0 - 5 /HPF Final     Leukocyte Esterase, Urine   Date Value Ref Range Status   07/06/2018 NEGATIVE NEG Final     Yeast, UA   Date Value Ref Range Status   07/22/2016 NOT REPORTED NONE Final     Glucose, UA   Date Value Ref Range Status   04/18/2012 NEGATIVE NEG Final     Glucose, Ur   Date Value Ref Range Status   07/06/2018 NEGATIVE NEG Final     Bilirubin, Urine   Date Value Ref Range Status   04/18/2012 NEGATIVE NEG Final     Bilirubin Urine   Date Value Ref Range Status   07/06/2018 NEGATIVE NEG Final       CULTURES:    Current Rehabilitation Assessments:  PHYSICAL THERAPY:     OCCUPATIONAL THERAPY:     SPEECH:      Assessment:  Active Problems:    Internal carotid artery stenosis, right  Resolved Problems:    * No resolved hospital problems.  *      Plan:  1. vasc consult    Genevieve Brenner MD             8/6/2018, 7:44 AM

## 2018-08-07 VITALS
OXYGEN SATURATION: 98 % | HEART RATE: 57 BPM | RESPIRATION RATE: 18 BRPM | SYSTOLIC BLOOD PRESSURE: 145 MMHG | WEIGHT: 216.49 LBS | DIASTOLIC BLOOD PRESSURE: 59 MMHG | BODY MASS INDEX: 27.78 KG/M2 | TEMPERATURE: 97.6 F | HEIGHT: 74 IN

## 2018-08-07 LAB — GLUCOSE BLD-MCNC: 199 MG/DL (ref 75–110)

## 2018-08-07 PROCEDURE — 97166 OT EVAL MOD COMPLEX 45 MIN: CPT

## 2018-08-07 PROCEDURE — 6360000002 HC RX W HCPCS: Performed by: FAMILY MEDICINE

## 2018-08-07 PROCEDURE — G8988 SELF CARE GOAL STATUS: HCPCS

## 2018-08-07 PROCEDURE — 6370000000 HC RX 637 (ALT 250 FOR IP): Performed by: FAMILY MEDICINE

## 2018-08-07 PROCEDURE — 2580000003 HC RX 258: Performed by: FAMILY MEDICINE

## 2018-08-07 PROCEDURE — 82947 ASSAY GLUCOSE BLOOD QUANT: CPT

## 2018-08-07 PROCEDURE — G8987 SELF CARE CURRENT STATUS: HCPCS

## 2018-08-07 PROCEDURE — 97530 THERAPEUTIC ACTIVITIES: CPT

## 2018-08-07 RX ORDER — FLUTICASONE PROPIONATE 50 MCG
2 SPRAY, SUSPENSION (ML) NASAL DAILY
Qty: 1 BOTTLE | Refills: 5
Start: 2018-08-07 | End: 2019-05-13

## 2018-08-07 RX ORDER — ASPIRIN 81 MG/1
81 TABLET, CHEWABLE ORAL NIGHTLY
Status: DISCONTINUED | OUTPATIENT
Start: 2018-08-07 | End: 2018-08-07 | Stop reason: HOSPADM

## 2018-08-07 RX ORDER — ATORVASTATIN CALCIUM 20 MG/1
20 TABLET, FILM COATED ORAL NIGHTLY
Status: DISCONTINUED | OUTPATIENT
Start: 2018-08-07 | End: 2018-08-07 | Stop reason: HOSPADM

## 2018-08-07 RX ORDER — ATORVASTATIN CALCIUM 20 MG/1
20 TABLET, FILM COATED ORAL NIGHTLY
Qty: 30 TABLET | Refills: 3 | Status: SHIPPED | OUTPATIENT
Start: 2018-08-07 | End: 2019-04-24

## 2018-08-07 RX ORDER — ASPIRIN 81 MG/1
81 TABLET, CHEWABLE ORAL NIGHTLY
Qty: 30 TABLET | Refills: 3 | Status: SHIPPED | OUTPATIENT
Start: 2018-08-07 | End: 2019-04-24

## 2018-08-07 RX ADMIN — ENOXAPARIN SODIUM 40 MG: 40 INJECTION SUBCUTANEOUS at 08:24

## 2018-08-07 RX ADMIN — INSULIN LISPRO 1 UNITS: 100 INJECTION, SOLUTION INTRAVENOUS; SUBCUTANEOUS at 08:25

## 2018-08-07 RX ADMIN — INSULIN LISPRO 1 UNITS: 100 INJECTION, SOLUTION INTRAVENOUS; SUBCUTANEOUS at 11:16

## 2018-08-07 RX ADMIN — METOPROLOL TARTRATE 50 MG: 50 TABLET ORAL at 08:24

## 2018-08-07 RX ADMIN — Medication 10 ML: at 08:25

## 2018-08-07 ASSESSMENT — PAIN DESCRIPTION - LOCATION: LOCATION: BACK

## 2018-08-07 ASSESSMENT — PAIN SCALES - GENERAL: PAINLEVEL_OUTOF10: 2

## 2018-08-07 NOTE — CONSULTS
Cardiology Consult           Date of Admission:  8/4/2018  Date of Consultation:  8/7/2018      PCP:  Nikita Martinez DO      Chief Complaint:   Cardiac clearance    History of Present Illness:  Shad Bermeo is a 80 y.o. male who presents with  hypertension  The presented to the ED with chief complaint of dizziness mainly with changing positions likely orthostatic in nature. Patient was seen by vascular surgery underwent carotid Dopplers that showed moderate 56% stenosis in the right internal carotid artery were consulted for  Cardiac clearance for possible need of carotid enterectomy. Patient denies chest pain and no signs of overt heart failure. No recent ischemic workup. PMH:   has a past medical history of Arthritis; Asthma; Cystitis; Diabetic neuropathy ([address] Diarrhea; Dizziness; GERD (gastroesophageal reflux disease); H/O acute bronchitis; [place] (hard of hearing); Hx-TIA (transient ischemic attack); Hypertension; Kyphosis of thoracolumbar region; Neurogenic claudication; Numbness and tingling; Onychomycosis; Prolonged emergence from general anesthesia; Spinal stenosis of lumbar region; Spondylolysis of lumbar region; Type II or unspecified type diabetes mellitus without mention of complication, not stated as uncontrolled; and Wears glasses. PSH:   has a past surgical history that includes Tonsillectomy; Cystocopy; Colonoscopy; Total knee arthroplasty (Left); Inguinal hernia repair (Bilateral); Cataract removal with implant (Bilateral); Lumbar spine surgery; Revision total knee arthroplasty (Left); and Total hip arthroplasty (Right, 07/13/2016). Allergies: Allergies   Allergen Reactions    Bactrim [Sulfamethoxazole-Trimethoprim]      Unknown reaction    Other      baset    Sulfa Antibiotics Other (See Comments)     psoriasis    Sulfur      Causes pt to break out psorsias    Pcn [Penicillins] Hives     Also causes pt's throat to swell a little.         Home Meds:    Prior to Admission medications    Medication Sig Start Date End Date Taking? Authorizing Provider   fluticasone (FLONASE) 50 MCG/ACT nasal spray 2 sprays by Nasal route daily 2 SPRAYS IN EACH NOSTRIL. 8/7/18 9/6/18 Yes Adrien Walker,    aspirin 81 MG chewable tablet Take 1 tablet by mouth nightly 8/7/18  Yes Adrien Walker,    atorvastatin (LIPITOR) 20 MG tablet Take 1 tablet by mouth nightly 8/7/18  Yes Adrien Walker, DO   Doxylamine-Phenylephrine-APAP (VICKS SINEX DAYTIME/NIGHTTIME PO) Take 1 capsule by mouth 2 times daily   Yes Historical Provider, MD   metoprolol tartrate (LOPRESSOR) 50 MG tablet Take 50 mg by mouth daily  5/6/17  Yes Historical Provider, MD   omeprazole (PRILOSEC) 20 MG delayed release capsule Take 20 mg by mouth Daily Indications: ([facility] Mail order)    Yes Historical Provider, MD   meloxicam (MOBIC) 15 MG tablet Take 15 mg by mouth daily Indications: ([facility] Mail order)    Yes Historical Provider, MD   budesonide-formoterol (SYMBICORT) 160-4.5 MCG/ACT AERO Inhale 1 puff into the lungs 2 times daily Indications: ([facility] Mail order)    Yes Historical Provider, MD LEIUS SOLOSTAR 100 UNIT/ML injection pen Inject 60 Units into the skin daily  Patient taking differently: Inject 55 Units into the skin daily  7/16/16  Yes Marely Baker MD   albuterol (PROVENTIL HFA;VENTOLIN HFA) 108 (90 BASE) MCG/ACT inhaler Inhale 2 puffs into the lungs every 6 hours as needed. Yes Historical Provider, MD   augmented betamethasone dipropionate (DIPROLENE-AF) 0.05 % cream  3/2/18   Historical Provider, MD   montelukast (SINGULAIR) 10 MG tablet  8/15/17   Historical Provider, MD   BD ULTRA-FINE PEN NEEDLES 29G X 12.7MM MISC Inject 1 each into the skin daily  4/7/16   Historical Provider, MD   loperamide (IMODIUM) 2 MG capsule Take 2 mg by mouth 4 times daily as needed.       Historical Provider, MD        [facility] Meds:    Current Facility-Administered Medications   Medication Dose Route Frequency Provider Last Rate Last Dose    atorvastatin (LIPITOR) tablet 20 mg  20 mg Oral Nightly Cherylene Lance, MD        aspirin chewable tablet 81 mg  81 mg Oral Nightly Cherylene Lance, MD        meclizine (ANTIVERT) tablet 12.5 mg  12.5 mg Oral TID PRN Sue Loge Meinke   12.5 mg at 08/06/18 0829    metoprolol tartrate (LOPRESSOR) tablet 50 mg  50 mg Oral Daily Sue Loge Meinke   50 mg at 08/07/18 6647    sodium chloride flush 0.9 % injection 10 mL  10 mL Intravenous 2 times per day Use Loge Meinke   10 mL at 08/07/18 0825    sodium chloride flush 0.9 % injection 10 mL  10 mL Intravenous PRN Sue Loge Meinke        acetaminophen (TYLENOL) tablet 650 mg  650 mg Oral Q4H PRN Sue Loge Meinke        enoxaparin (LOVENOX) injection 40 mg  40 mg Subcutaneous Daily Sue Loge Meinke   40 mg at 08/07/18 5524    insulin lispro (HUMALOG) injection vial 0-6 Units  0-6 Units Subcutaneous TID WC Sue Loge Meinke   1 Units at 08/07/18 1116    insulin lispro (HUMALOG) injection vial 0-3 Units  0-3 Units Subcutaneous Nightly Sue Loge Meinke   1 Units at 08/06/18 2147    glucose (GLUTOSE) 40 % oral gel 15 g  15 g Oral PRN Suetiara Herrone Meinke        dextrose 50 % solution 12.5 g  12.5 g Intravenous PRN Sue Loge Meinke        glucagon (rDNA) injection 1 mg  1 mg Intramuscular PRN Wil Juarez FERGUSON Meinke        dextrose 5 % solution  100 mL/hr Intravenous PRN Lyell Balk           Social History:       TOBACCO:   reports that he has quit smoking. His smoking use included Cigarettes. He quit smokeless tobacco use about 32 years ago. ETOH:   reports that he does not drink alcohol. DRUGS:  reports that he does not use drugs. OCCUPATION:          Family Histroy:     Family History   Problem Relation Age of Onset    Emphysema Mother     Heart Attack Father     Coronary Art Dis Father     High Blood Pressure Father            Review of Systems:   · Constitutional: there has been no unanticipated weight loss.  There's been no change in energy extremities normal, atraumatic, no cyanosis or edema    Skin: Skin color, texture, turgor normal. No rashes or lesions    Neurologic: Grossly normal            Labs:      CBC: No results for input(s): WBC, HGB, HCT, MCV, PLT in the last 72 hours. BMP: No results for input(s): NA, K, CL, CO2, PHOS, BUN, CREATININE in the last 72 hours. Invalid input(s): CA  PT/INR: No results for input(s): PROTIME, INR in the last 72 hours. APTT: No results for input(s): APTT in the last 72 hours. MAG: No results for input(s): MG in the last 72 hours. D Dimer: No results for input(s): DDIMER in the last 72 hours. Troponin T No results for input(s): TROPONINT in the last 72 hours. ProBNP Invalid input(s): PRO-BNP          Diagnosis:  Active Problems:    Internal carotid artery stenosis, right  Resolved Problems:    * No resolved hospital problems. *          Plan:    Patient will need ischemic workup before cardiac clearance due to slow functional capacity and no previous ischemic workup. I will recommend moderate intensity statin due to his age and aspirin in the meantime. Continue beta-blocker. Recent 2D echo normal EF. Will sign off patient needs to follow up outpatient for ischemic workup If there is a plan to pursue carotid surgery.     Electronically signed by Fuentes Givens DO on 8/7/2018 at 3:19 PM

## 2018-08-07 NOTE — PROGRESS NOTES
[address]   Occupational Therapy Evaluation  Date: 18  Patient Name: Roma Best       Room: [facility]/1142-65  MRN: [id number]  Account: [de-identified]   : 3/12/1931  (80 y.o.) Gender: male     Discharge Recommendations:  [address]    Referring Practitioner: Dr. Lynn Gregoyr  Diagnosis: Internal cartoid artery stenosis, right    Treatment Diagnosis: Impaired self-care status. Past Medical History:  has a past medical history of Arthritis; Asthma; Cystitis; Diabetic neuropathy ([address] Diarrhea; Dizziness; GERD (gastroesophageal reflux disease); H/O acute bronchitis; [place] (hard of hearing); Hx-TIA (transient ischemic attack); Hypertension; Kyphosis of thoracolumbar region; Neurogenic claudication; Numbness and tingling; Onychomycosis; Prolonged emergence from general anesthesia; Spinal stenosis of lumbar region; Spondylolysis of lumbar region; Type II or unspecified type diabetes mellitus without mention of complication, not stated as uncontrolled; and Wears glasses. Past Surgical History:   has a past surgical history that includes Tonsillectomy; Cystocopy; Colonoscopy; Total knee arthroplasty (Left); Inguinal hernia repair (Bilateral); Cataract removal with implant (Bilateral); Lumbar spine surgery; Revision total knee arthroplasty (Left); and Total hip arthroplasty (Right, 2016).     Restrictions  Restrictions/Precautions: Fall Risk  Implants present? : Metal implants (L TKA ; L DILCIA; 2 pins and 6 screws in back)  Other position/activity restrictions: lumbar fusion,RTHR/LTKR  Required Braces or Orthoses?: No     Vitals  Temp: 98.2 °F (36.8 °C)  Pulse: 65  Resp: 16  BP: (!) 154/69  Height: 6' 2\" (188 cm)  Weight: 216 lb 7.9 oz (98.2 kg)  BMI (Calculated): 27.9  Oxygen Therapy  SpO2: 97 %  O2 Device: None (Room air)  Blood Pressure Lyin/63  Pulse Lyin PER MINUTE  Blood Pressure Sittin/62  Pulse Sittin PER MINUTE  Blood Pressure Standing: 138/72  Pulse Standin PER MINUTE  Level of Consciousness: Alert    Subjective  Subjective: \"Yeah, I'm going to [facility]\"  Overall Orientation Status: Within Functional Limits  Vision  Vision: Impaired  Vision Exceptions: Wears glasses at all times  Hearing  Hearing: Exceptions to [facility]  Hearing Exceptions: Bilateral hearing aid, Hard of hearing/hearing concerns  Social/Functional History  Lives With: Spouse  Type of Home: House  Home Layout: One level  Home Access: Ramped entrance (\"we have to change that, it's too steep\")  Bathroom Shower/Tub: Walk-in shower, Shower chair without back, Doors  Bathroom Toilet: Handicap height  Bathroom Equipment: Grab bars around toilet, Grab bars in shower, Shower chair  Bathroom Accessibility: Walker accessible  Home Equipment: 4 wheeled walkerGale ([address] [place] [address] in [facility])  Receives Help From: Family  ADL Assistance:  (HHA 1x/wk for showers, IND c dressing/toileting)  Bath: Moderate assistance  Homemaking Assistance: Needs assistance  Homemaking Responsibilities: Yes (Pt does laundry)  Ambulation Assistance: Independent (c 4WW in home)  Transfer Assistance: Independent (prior to last 2 weeks)  Active : No  Mode of Transportation: Family  Occupation: Retired  Additional Comments: Pt's wife reports that he has had frequent falls in last two weeks. Pt's wife is retired and can provide A as needed. Objective      Cognition  Overall Cognitive Status: WFL   Sensation  Overall Sensation Status: WFL (denies)   ADL  Feeding: Setup (Per Pt report)  Grooming: Setup  UE Bathing: Setup  LE Bathing: Moderate assistance  UE Dressing: Setup  LE Dressing: Maximum assistance  Toileting: Minimal assistance  Additional Comments: Pt sat EOB for about 5 minutes with c/o dizziness with SBA for static sitting balance and CGA for dynamic sitting balance. Pt doffed/donned R sock c CGA but required A for L sock.  Pt engaged in sit to stand transfer c RW, Min A, and Mod VCs for hand placement and safety. Pt engaged in 2 steps forward/backward c CGA and RW.    UE Function  LUE Strength  Gross LUE Strength: Ex[facility] to [facility]  L Hand Grasp: 4-/5  LUE Strength Comment: Shoulder 4-/5, elbow 4-/5     LUE Tone: Normotonic     LUE AROM (degrees)  LUE AROM : WFL     Left Hand AROM (degrees)  Left Hand AROM: WFL  RUE Strength  Gross RUE Strength: Exceptions to [facility]  R Hand Grasp: 4-/5  RUE Strength Comment: Shoulder 4-/5, elbow 4-/5      RUE Tone: Normotonic     RUE AROM (degrees)  RUE AROM : WFL     Right Hand AROM (degrees)  Right Hand AROM: WFL    Fine Motor Skills  Coordination  Movements Are Fluid And Coordinated: Yes     Mobility  Supine to Sit: Stand by assistance  Sit to Supine: Stand by assistance       Balance  Sitting Balance: Contact guard assistance (CGA dynamic; SBA static)  Standing Balance: Contact guard assistance  Standing Balance  Time: ~2 minutes  Sit to stand: Minimal assistance  Stand to sit: Minimal assistance  Functional Mobility  Functional - Mobility Device: Rolling Walker  Activity: Other  Assist Level: Contact guard assistance  Functional Mobility Comments: 2 steps forward/backward c Mod VCs for safety/hand placement  Bed mobility  Supine to Sit: Stand by assistance  Sit to Supine: Stand by assistance     Transfers  Stand Step Transfers: Contact guard assistance  Sit to stand: Minimal assistance  Stand to sit: Minimal assistance  Transfer Comments: c Mod VCs for hand placement/safety  Functional Activity Tolerance  Functional Activity Tolerance: Tolerates < 10 Min exercise, no significant change in vital signs   Assessment  Performance deficits / Impairments: Decreased functional mobility , Decreased ADL status, Decreased strength, Decreased safe awareness, Decreased endurance, Decreased balance, Decreased high-level IADLs  Treatment Diagnosis: Impaired self-care status.   Prognosis: Good  Decision Making: Medium Complexity  Patient Education: OT POC, d/c recommendation to SNF  REQUIRES OT FOLLOW UP: Yes  Discharge Recommendations: Subacute/Skilled Nursing Facility  Activity Tolerance:  (c/o dizziness)    G CODE  OT G-codes  Functional Assessment Tool Used: AM-PAC  Score: 16/24  Functional Limitation: Self care  Self Care Current Status (): At least 40 percent but less than 60 percent impaired, limited or restricted  Self Care Goal Status (): At least 40 percent but less than 60 percent impaired, limited or restricted    Goals  Patient Goals   Patient goals : To get stronger and go to SNF  Short term goals  Time Frame for Short term goals: By discharge  Short term goal 1: Pt will perform LB dressing/bathing with CGA and good safety awareness. Short term goal 2: Pt will perform functional transfers with RW, SBA, and good safety awareness. Short term goal 3: Pt will perform toileting tasks with SBA and good safety awareness. Short term goal 4: Pt will participate in 15+ minutes of therapeutic exercise/functional activities to promote increased IND with self-care and mobility. Short term goal 5: Pt will stand for 5+ minutes with 1-2 UE support and SBA while engaging in functional activity of choice.     Plan  Safety Devices  Safety Devices in place: Yes  Type of devices: Bed alarm in place, Call light within reach, Gait belt, Patient at risk for falls, Left in bed     Plan  Times per week: 5-7  Times per day: Daily  Current Treatment Recommendations: Strengthening, Balance Training, Functional Mobility Training, Endurance Training, Safety Education & Training, Patient/Caregiver Education & Training, Equipment Evaluation, Education, & procurement, Self-Care / ADL, Home Management Training    Equipment Recommendations  Equipment Needed:  (TBD)  OT Individual Minutes  Time In: 9399  Time Out: 3259  Minutes: 33    Electronically signed by SHAUN Robledo on 8/7/18 at 12:13 PM

## 2018-08-07 NOTE — CONSULTS
[address] [facility]                   [address] [facility] [address]                                   CONSULTATION    PATIENT NAME: Jan Brice                 :        1931  MED REC NO:   [id number]                              ROOM:       2072  ACCOUNT NO:   [de-identified]                           ADMIT DATE: 2018  PROVIDER:     Brigido Arora    CONSULT DATE:  2018    CHIEF COMPLAINT:  History of fall and dizziness. HISTORY OF PRESENT ILLNESS:  This in an 51-year-old white male who is  admitted through emergency room. The patient apparently has history of  dizziness. He had a carotid scan study, which was abnormal and the patient  was to follow and see me in the office, but has not made it. This time, he  got dizzy to the point that he fell down and has been complaining of hip  pain. The patient has been admitted. Since admission, the patient has  stabilized. He denies any obvious gross neurological deficit at the  present time. The patient was interviewed with his wife present and she  did not mention any lateralizing neurological symptoms this time or before. The patient is to have another carotid scan study done today which will be  reviewed. The patient lives with his wife. He does walk with a walker,  but his walking is very restricted. PAST MEDICAL HISTORY:  History of multiple medical issues including  diabetes with neuropathy, history of hypertension, spinal stenosis, history  of kyphosis and arthritis. PAST SURGICAL HISTORY:  History of lumbar spine surgery and revision of  knee arthroscopic surgery, history of total hip and inguinal hernia repair. MEDICATIONS:  Are as listed. ALLERGIES:  The patient is allergic to BACTRIM, SULFA and PENICILLIN. FAMILY HISTORY:  Nothing contributory. SOCIAL HISTORY:  The patient lives with his wife. He is a past smoker.     REVIEW OF SYSTEMS:  NEUROLOGIC:  The patient

## 2018-08-07 NOTE — PLAN OF CARE
Problem: Falls - Risk of:  Goal: Will remain free from falls  Will remain free from falls   Outcome: Ongoing  Pt resting comfortable in bed at this time. No distress noted. Assessment remains as charted. Pt remains free from falls this shift. Bed in low position, call light in reach, side rails up x 2, Cont to monior closely  Goal: Absence of physical injury  Absence of physical injury   Outcome: Ongoing      Problem: Safety:  Goal: Free from accidental physical injury  Free from accidental physical injury   Outcome: Ongoing      Problem: Daily Care:  Goal: Daily care needs are met  Daily care needs are met   Outcome: Ongoing  Daily care met this shift    Problem: Pain:  Goal: Pain level will decrease  Pain level will decrease   Outcome: Ongoing  Pt denies pain this shift.  Cont to assist pt with repositioning as needed  Goal: Control of acute pain  Control of acute pain   Outcome: Ongoing    Goal: Control of chronic pain  Control of chronic pain   Outcome: Ongoing

## 2018-08-07 NOTE — PROGRESS NOTES
DIANNE informed that [facility] will accept pt. Liaison completed on-site visit. Discharge orders sent to [facility]. Transport set for [facility]. DIANNE completed 7000 in [place].

## 2018-08-07 NOTE — DISCHARGE SUMMARY
Physician Discharge Summary     Patient ID:  Shad Bermeo  [id number]  68 y.o.  3/12/1931    Admit date: 8/4/2018    Discharge date and time:      Admitting Physician: Nikita Martinez DO     Discharge Physician: Nikita Martinez DO      Admission Diagnoses:   Internal carotid artery stenosis, right [I65.21]    Discharge Diagnoses:   Patient Active Problem List   Diagnosis Code    Lumbosacral spondylosis without myelopathy M47.817    Degeneration of lumbar or lumbosacral intervertebral disc M51.37    Spinal stenosis, lumbar region, with neurogenic claudication M48.062    Lumbar spondylolysis M43.06    Postural kyphosis of lumbar region M40.05    Arthritis of right hip M16.11    Right hip pain M25.551    Weight loss R63.4    Right lower quadrant abdominal pain R10.31    Anemia D64.9    Dizziness R42    Internal carotid artery stenosis, right I65.21     Active Problems:    Internal carotid artery stenosis, right  Resolved Problems:    * No resolved hospital problems. *      Admission Condition: fair    Discharged Condition: fair    Hospital Course: stable, no changes when in bed, needs balance and strength, SNF    Significant Diagnostic Studies: radiology: Ultrasound: carotids    Treatments: monitor an bo for carotid dx    Disposition: SNF    Patient Instructions:     Discharge Medications:  Current Discharge Medication List           Details   fluticasone (FLONASE) 50 MCG/ACT nasal spray 2 sprays by Nasal route daily 2 SPRAYS IN EACH NOSTRIL.   Qty: 1 Bottle, Refills: 5              Details   Doxylamine-Phenylephrine-APAP (VICKS SINEX DAYTIME/NIGHTTIME PO) Take 1 capsule by mouth 2 times daily      metoprolol tartrate (LOPRESSOR) 50 MG tablet Take 50 mg by mouth daily       omeprazole (PRILOSEC) 20 MG delayed release capsule Take 20 mg by mouth Daily Indications: ([facility] Mail order)       meloxicam (MOBIC) 15 MG tablet Take 15 mg by mouth daily Indications: ([facility] [employer] Mail order)

## 2018-08-13 ENCOUNTER — HOSPITAL ENCOUNTER (OUTPATIENT)
Age: 83
Setting detail: SPECIMEN
Discharge: HOME | End: 2018-08-13
Payer: MEDICARE

## 2018-08-13 LAB
ANION GAP SERPL CALCULATED.3IONS-SCNC: 14 MMOL/L (ref 9–17)
BUN BLDV-MCNC: 19 MG/DL (ref 8–23)
BUN/CREAT BLD: ABNORMAL (ref 9–20)
CALCIUM SERPL-MCNC: 8.4 MG/DL (ref 8.6–10.4)
CHLORIDE BLD-SCNC: 101 MMOL/L (ref 98–107)
CO2: 22 MMOL/L (ref 20–31)
CREAT SERPL-MCNC: 1.26 MG/DL (ref 0.7–1.2)
ESTIMATED AVERAGE GLUCOSE: 154 MG/DL
GFR AFRICAN AMERICAN: >60 ML/MIN
GFR NON-AFRICAN AMERICAN: 54 ML/MIN
GFR SERPL CREATININE-BSD FRML MDRD: ABNORMAL ML/MIN/{1.73_M2}
GFR SERPL CREATININE-BSD FRML MDRD: ABNORMAL ML/MIN/{1.73_M2}
GLUCOSE BLD-MCNC: 45 MG/DL (ref 70–99)
HBA1C MFR BLD: 7 % (ref 4–6)
POTASSIUM SERPL-SCNC: 4.4 MMOL/L (ref 3.7–5.3)
SODIUM BLD-SCNC: 137 MMOL/L (ref 135–144)
VITAMIN D 25-HYDROXY: 16.8 NG/ML (ref 30–100)

## 2018-08-13 PROCEDURE — 83036 HEMOGLOBIN GLYCOSYLATED A1C: CPT

## 2018-08-13 PROCEDURE — 36415 COLL VENOUS BLD VENIPUNCTURE: CPT

## 2018-08-13 PROCEDURE — 82306 VITAMIN D 25 HYDROXY: CPT

## 2018-08-13 PROCEDURE — P9603 ONE-WAY ALLOW PRORATED MILES: HCPCS

## 2018-08-13 PROCEDURE — 80048 BASIC METABOLIC PNL TOTAL CA: CPT

## 2018-08-14 LAB
GLUCOSE BLD-MCNC: 113 MG/DL (ref 75–110)
GLUCOSE BLD-MCNC: 136 MG/DL (ref 75–110)
GLUCOSE BLD-MCNC: 140 MG/DL (ref 75–110)
GLUCOSE BLD-MCNC: 146 MG/DL (ref 75–110)
GLUCOSE BLD-MCNC: 157 MG/DL (ref 75–110)
GLUCOSE BLD-MCNC: 177 MG/DL (ref 75–110)
GLUCOSE BLD-MCNC: 210 MG/DL (ref 75–110)
GLUCOSE BLD-MCNC: 282 MG/DL (ref 75–110)

## 2018-08-29 ENCOUNTER — HOSPITAL ENCOUNTER (OUTPATIENT)
Age: 83
Setting detail: SPECIMEN
Discharge: HOME | End: 2018-08-29
Payer: MEDICARE

## 2018-08-29 LAB
ANION GAP SERPL CALCULATED.3IONS-SCNC: 11 MMOL/L (ref 9–17)
BUN BLDV-MCNC: 19 MG/DL (ref 8–23)
BUN/CREAT BLD: ABNORMAL (ref 9–20)
CALCIUM SERPL-MCNC: 8.7 MG/DL (ref 8.6–10.4)
CHLORIDE BLD-SCNC: 101 MMOL/L (ref 98–107)
CO2: 24 MMOL/L (ref 20–31)
CREAT SERPL-MCNC: 1.34 MG/DL (ref 0.7–1.2)
GFR AFRICAN AMERICAN: >60 ML/MIN
GFR NON-AFRICAN AMERICAN: 50 ML/MIN
GFR SERPL CREATININE-BSD FRML MDRD: ABNORMAL ML/MIN/{1.73_M2}
GFR SERPL CREATININE-BSD FRML MDRD: ABNORMAL ML/MIN/{1.73_M2}
GLUCOSE BLD-MCNC: 51 MG/DL (ref 70–99)
POTASSIUM SERPL-SCNC: 4.6 MMOL/L (ref 3.7–5.3)
SODIUM BLD-SCNC: 136 MMOL/L (ref 135–144)

## 2018-08-29 PROCEDURE — P9603 ONE-WAY ALLOW PRORATED MILES: HCPCS

## 2018-08-29 PROCEDURE — 80048 BASIC METABOLIC PNL TOTAL CA: CPT

## 2018-08-29 PROCEDURE — 36415 COLL VENOUS BLD VENIPUNCTURE: CPT

## 2018-09-14 ENCOUNTER — HOSPITAL ENCOUNTER (OUTPATIENT)
Age: 83
Setting detail: SPECIMEN
Discharge: HOME | End: 2018-09-14
Payer: MEDICARE

## 2018-09-14 LAB
ESTIMATED AVERAGE GLUCOSE: 154 MG/DL
HBA1C MFR BLD: 7 % (ref 4–6)

## 2018-09-14 PROCEDURE — 83036 HEMOGLOBIN GLYCOSYLATED A1C: CPT

## 2018-09-14 PROCEDURE — 36415 COLL VENOUS BLD VENIPUNCTURE: CPT

## 2018-09-14 PROCEDURE — P9603 ONE-WAY ALLOW PRORATED MILES: HCPCS

## 2019-04-18 ENCOUNTER — HOSPITAL ENCOUNTER (OUTPATIENT)
Age: 84
Discharge: HOME | End: 2019-04-18
Payer: MEDICARE

## 2019-04-18 ENCOUNTER — HOSPITAL ENCOUNTER (OUTPATIENT)
Dept: GENERAL RADIOLOGY | Age: 84
Discharge: HOME | End: 2019-04-20
Payer: MEDICARE

## 2019-04-18 ENCOUNTER — HOSPITAL ENCOUNTER (OUTPATIENT)
Age: 84
Discharge: HOME | End: 2019-04-20
Payer: MEDICARE

## 2019-04-18 DIAGNOSIS — J44.9 CHRONIC OBSTRUCTIVE PULMONARY DISEASE, UNSPECIFIED COPD TYPE (HCC): ICD-10-CM

## 2019-04-18 LAB
ABSOLUTE EOS #: 0.9 K/UL (ref 0–0.4)
ABSOLUTE IMMATURE GRANULOCYTE: ABNORMAL K/UL (ref 0–0.3)
ABSOLUTE LYMPH #: 4.4 K/UL (ref 1–4.8)
ABSOLUTE MONO #: 0.8 K/UL (ref 0.1–1.3)
ALBUMIN SERPL-MCNC: 4.1 G/DL (ref 3.5–5.2)
ALBUMIN/GLOBULIN RATIO: ABNORMAL (ref 1–2.5)
ALP BLD-CCNC: 55 U/L (ref 40–129)
ALT SERPL-CCNC: 13 U/L (ref 5–41)
ANION GAP SERPL CALCULATED.3IONS-SCNC: 15 MMOL/L (ref 9–17)
AST SERPL-CCNC: 21 U/L
BASOPHILS # BLD: 0 % (ref 0–2)
BASOPHILS ABSOLUTE: 0 K/UL (ref 0–0.2)
BILIRUB SERPL-MCNC: 0.77 MG/DL (ref 0.3–1.2)
BILIRUBIN DIRECT: 0.19 MG/DL
BILIRUBIN, INDIRECT: 0.58 MG/DL (ref 0–1)
BUN BLDV-MCNC: 21 MG/DL (ref 8–23)
CALCIUM SERPL-MCNC: 8.7 MG/DL (ref 8.6–10.4)
CHLORIDE BLD-SCNC: 105 MMOL/L (ref 98–107)
CHOLESTEROL/HDL RATIO: 2.5
CHOLESTEROL: 139 MG/DL
CO2: 19 MMOL/L (ref 20–31)
CREAT SERPL-MCNC: 1.44 MG/DL (ref 0.7–1.2)
DIFFERENTIAL TYPE: ABNORMAL
EOSINOPHILS RELATIVE PERCENT: 8 % (ref 0–4)
GFR AFRICAN AMERICAN: 56 ML/MIN
GFR NON-AFRICAN AMERICAN: 46 ML/MIN
GFR SERPL CREATININE-BSD FRML MDRD: ABNORMAL ML/MIN/{1.73_M2}
GFR SERPL CREATININE-BSD FRML MDRD: ABNORMAL ML/MIN/{1.73_M2}
GLUCOSE BLD-MCNC: 47 MG/DL (ref 70–99)
HCT VFR BLD CALC: 39.5 % (ref 41–53)
HDLC SERPL-MCNC: 55 MG/DL
HEMOGLOBIN: 13 G/DL (ref 13.5–17.5)
IMMATURE GRANULOCYTES: ABNORMAL %
LDL CHOLESTEROL: 63 MG/DL (ref 0–130)
LYMPHOCYTES # BLD: 39 % (ref 24–44)
MCH RBC QN AUTO: 29.2 PG (ref 26–34)
MCHC RBC AUTO-ENTMCNC: 32.9 G/DL (ref 31–37)
MCV RBC AUTO: 88.8 FL (ref 80–100)
MONOCYTES # BLD: 8 % (ref 1–7)
NRBC AUTOMATED: ABNORMAL PER 100 WBC
PDW BLD-RTO: 15 % (ref 11.5–14.9)
PLATELET # BLD: 357 K/UL (ref 150–450)
PLATELET ESTIMATE: ABNORMAL
PMV BLD AUTO: 7.8 FL (ref 6–12)
POTASSIUM SERPL-SCNC: 4.3 MMOL/L (ref 3.7–5.3)
PROSTATE SPECIFIC ANTIGEN: 0.04 UG/L
RBC # BLD: 4.45 M/UL (ref 4.5–5.9)
RBC # BLD: ABNORMAL 10*6/UL
SEG NEUTROPHILS: 45 % (ref 36–66)
SEGMENTED NEUTROPHILS ABSOLUTE COUNT: 5.1 K/UL (ref 1.3–9.1)
SODIUM BLD-SCNC: 139 MMOL/L (ref 135–144)
THYROXINE, FREE: 1.19 NG/DL (ref 0.93–1.7)
TOTAL PROTEIN: 7.6 G/DL (ref 6.4–8.3)
TRIGL SERPL-MCNC: 104 MG/DL
TSH SERPL DL<=0.05 MIU/L-ACNC: 5.92 MIU/L (ref 0.3–5)
VLDLC SERPL CALC-MCNC: NORMAL MG/DL (ref 1–30)
WBC # BLD: 11.3 K/UL (ref 3.5–11)
WBC # BLD: ABNORMAL 10*3/UL

## 2019-04-18 PROCEDURE — 80053 COMPREHEN METABOLIC PANEL: CPT

## 2019-04-18 PROCEDURE — 80061 LIPID PANEL: CPT

## 2019-04-18 PROCEDURE — 84443 ASSAY THYROID STIM HORMONE: CPT

## 2019-04-18 PROCEDURE — 36415 COLL VENOUS BLD VENIPUNCTURE: CPT

## 2019-04-18 PROCEDURE — 82248 BILIRUBIN DIRECT: CPT

## 2019-04-18 PROCEDURE — 71046 X-RAY EXAM CHEST 2 VIEWS: CPT

## 2019-04-18 PROCEDURE — G0103 PSA SCREENING: HCPCS

## 2019-04-18 PROCEDURE — 85025 COMPLETE CBC W/AUTO DIFF WBC: CPT

## 2019-04-18 PROCEDURE — 84439 ASSAY OF FREE THYROXINE: CPT

## 2019-04-24 ENCOUNTER — HOSPITAL ENCOUNTER (OUTPATIENT)
Age: 84
Discharge: HOME | DRG: 063 | End: 2019-04-24
Payer: MEDICARE

## 2019-04-24 ENCOUNTER — OFFICE VISIT (OUTPATIENT)
Dept: PODIATRY | Age: 84
End: 2019-04-24
Payer: MEDICARE

## 2019-04-24 VITALS — WEIGHT: 206 LBS | BODY MASS INDEX: 25.61 KG/M2 | HEIGHT: 75 IN

## 2019-04-24 DIAGNOSIS — L84 PRE-ULCERATIVE CORN OR CALLOUS: ICD-10-CM

## 2019-04-24 DIAGNOSIS — Z79.4 TYPE 2 DIABETES MELLITUS WITH DIABETIC POLYNEUROPATHY, WITH LONG-TERM CURRENT USE OF INSULIN (HCC): ICD-10-CM

## 2019-04-24 DIAGNOSIS — E11.42 TYPE 2 DIABETES MELLITUS WITH DIABETIC POLYNEUROPATHY, WITH LONG-TERM CURRENT USE OF INSULIN (HCC): ICD-10-CM

## 2019-04-24 DIAGNOSIS — M21.962 FOOT DEFORMITY, LEFT: ICD-10-CM

## 2019-04-24 DIAGNOSIS — B35.1 ONYCHOMYCOSIS: ICD-10-CM

## 2019-04-24 DIAGNOSIS — D23.72 BENIGN NEOPLASM OF SKIN OF FOOT, LEFT: ICD-10-CM

## 2019-04-24 DIAGNOSIS — L98.9 BENIGN SKIN LESION: Primary | ICD-10-CM

## 2019-04-24 LAB
ABSOLUTE EOS #: 0.5 K/UL (ref 0–0.4)
ABSOLUTE IMMATURE GRANULOCYTE: ABNORMAL K/UL (ref 0–0.3)
ABSOLUTE LYMPH #: 2.6 K/UL (ref 1–4.8)
ABSOLUTE MONO #: 0.7 K/UL (ref 0.1–1.3)
BASOPHILS # BLD: 0 % (ref 0–2)
BASOPHILS ABSOLUTE: 0 K/UL (ref 0–0.2)
DIFFERENTIAL TYPE: ABNORMAL
EOSINOPHILS RELATIVE PERCENT: 5 % (ref 0–4)
HCT VFR BLD CALC: 38.8 % (ref 41–53)
HEMOGLOBIN: 12.9 G/DL (ref 13.5–17.5)
IMMATURE GRANULOCYTES: ABNORMAL %
LYMPHOCYTES # BLD: 26 % (ref 24–44)
MCH RBC QN AUTO: 29.7 PG (ref 26–34)
MCHC RBC AUTO-ENTMCNC: 33.3 G/DL (ref 31–37)
MCV RBC AUTO: 89.1 FL (ref 80–100)
MONOCYTES # BLD: 7 % (ref 1–7)
NRBC AUTOMATED: ABNORMAL PER 100 WBC
PDW BLD-RTO: 15.2 % (ref 11.5–14.9)
PLATELET # BLD: 327 K/UL (ref 150–450)
PLATELET ESTIMATE: ABNORMAL
PMV BLD AUTO: 7.2 FL (ref 6–12)
RBC # BLD: 4.35 M/UL (ref 4.5–5.9)
RBC # BLD: ABNORMAL 10*6/UL
SEG NEUTROPHILS: 62 % (ref 36–66)
SEGMENTED NEUTROPHILS ABSOLUTE COUNT: 6.1 K/UL (ref 1.3–9.1)
WBC # BLD: 10 K/UL (ref 3.5–11)
WBC # BLD: ABNORMAL 10*3/UL

## 2019-04-24 PROCEDURE — 11721 DEBRIDE NAIL 6 OR MORE: CPT | Performed by: PODIATRIST

## 2019-04-24 PROCEDURE — 85025 COMPLETE CBC W/AUTO DIFF WBC: CPT

## 2019-04-24 PROCEDURE — G8419 CALC BMI OUT NRM PARAM NOF/U: HCPCS | Performed by: PODIATRIST

## 2019-04-24 PROCEDURE — 36415 COLL VENOUS BLD VENIPUNCTURE: CPT

## 2019-04-24 PROCEDURE — 99213 OFFICE O/P EST LOW 20 MIN: CPT | Performed by: PODIATRIST

## 2019-04-24 PROCEDURE — 4040F PNEUMOC VAC/ADMIN/RCVD: CPT | Performed by: PODIATRIST

## 2019-04-24 PROCEDURE — G8598 ASA/ANTIPLAT THER USED: HCPCS | Performed by: PODIATRIST

## 2019-04-24 PROCEDURE — G8427 DOCREV CUR MEDS BY ELIG CLIN: HCPCS | Performed by: PODIATRIST

## 2019-04-24 PROCEDURE — 1123F ACP DISCUSS/DSCN MKR DOCD: CPT | Performed by: PODIATRIST

## 2019-04-24 PROCEDURE — 1036F TOBACCO NON-USER: CPT | Performed by: PODIATRIST

## 2019-04-24 PROCEDURE — 17110 DESTRUCTION B9 LES UP TO 14: CPT | Performed by: PODIATRIST

## 2019-04-24 RX ORDER — CLOPIDOGREL BISULFATE 75 MG/1
TABLET ORAL
Status: ON HOLD | COMMUNITY
Start: 2019-04-21 | End: 2019-05-01 | Stop reason: HOSPADM

## 2019-04-24 ASSESSMENT — ENCOUNTER SYMPTOMS
COLOR CHANGE: 0
CONSTIPATION: 0
VOMITING: 0
NAUSEA: 0
DIARRHEA: 0

## 2019-04-24 NOTE — PROGRESS NOTES
 atorvastatin (LIPITOR) 20 MG tablet Take 1 tablet by mouth nightly 30 tablet 3    Doxylamine-Phenylephrine-APAP (VICKS SINEX DAYTIME/NIGHTTIME PO) Take 1 capsule by mouth 2 times daily      augmented betamethasone dipropionate (DIPROLENE-AF) 0.05 % cream       montelukast (SINGULAIR) 10 MG tablet       metoprolol tartrate (LOPRESSOR) 50 MG tablet Take 50 mg by mouth daily       omeprazole (PRILOSEC) 20 MG delayed release capsule Take 20 mg by mouth Daily Indications: ([facility] Mail order)       meloxicam (MOBIC) 15 MG tablet Take 15 mg by mouth daily Indications: ([facility] Mail order)       budesonide-formoterol (SYMBICORT) 160-4.5 MCG/ACT AERO Inhale 1 puff into the lungs 2 times daily Indications: ([facility] Mail order)       LANTUS SOLOSTAR 100 UNIT/ML injection pen Inject 60 Units into the skin daily (Patient taking differently: Inject 55 Units into the skin daily ) 5 Pen 3    BD ULTRA-FINE PEN NEEDLES 29G X 12.7MM MISC Inject 1 each into the skin daily       loperamide (IMODIUM) 2 MG capsule Take 2 mg by mouth 4 times daily as needed.  albuterol (PROVENTIL HFA;VENTOLIN HFA) 108 (90 BASE) MCG/ACT inhaler Inhale 2 puffs into the lungs every 6 hours as needed. No current facility-administered medications on file prior to visit. Review of Systems   Constitutional: Negative for chills, diaphoresis, fatigue, fever and unexpected weight change. Cardiovascular: Negative for leg swelling. Gastrointestinal: Negative for constipation, diarrhea, nausea and vomiting. Musculoskeletal: Positive for gait problem. Negative for arthralgias and joint swelling. Skin: Negative for color change, pallor, rash and wound. Neurological: Positive for numbness. Negative for weakness. Objective:  General: AAO x 3 in NAD.     Derm  Sites of Onychomycosis Involvement (Check affected area)  [x] [x] [x] [x] [x] [x] [x] [x] [x] [x]  5 4 3 2 1 1 2 3 4 5 Right                                        Left    Thickness  [x] [x] [x] [x] [x] [x] [x] [x] [x] [x]  5 4 3 2 1 1 2 3 4 5                         Right                                        Left    Dystrophic Changes                                                                 [x] [x] [x] [x] [x] [x] [x] [x] [x] [x]  5 4 3 2 1 1 2 3 4 5                         Right                                        Left    Color                                                                  [x] [x] [x] [x] [x] [x] [x] [x] [x] [x]  5 4 3 2 1 1 2 3 4 5                          Right                                        Left    Incurvation/Ingrowin                                                                   [] [] [] [] [] [] [] [] [] []  5 4 3 2 1 1 2 3 4 5                         Right                                        Left    Inflammation/Pain                                                                   [x] [x] [x] [x] [x] [x] [x] [x] [x] [x]  5 4 3 2 1 1 2 3 4 5                         Right                                        Left         Skin lesion/ulceration Present. Hyperkeratosis sub 1st met head left, no ulceration,  Pre-ulcerative   Skin No rashes or nodules noted. .      Vascular:  DP/PT pulses palpable 2/4, Bilateral.    CFT <3 seconds to digits 1-5, Bilateral .   Hair growth absent to level of digits, Bilateral.  Edema present, Bilateral.  Erythema absent, Bilateral.     DM with PVD       []Yes    [x]No    Neurological:  Sensation absent to light touch to level of digits, Bilateral.  Protective sensation absent via 5.07/10g Whitehouse-Kayla monofilament 3/10 sites, Bilateral.  Vibratory sensation absent to 1st MPJ, Bilateral.     Musculoskeletal: Prominent left first met head plantar, contracted toes 2-5 bilateral      Assessment:  80 y.o. male with:  1. Benign skin lesion    2. Pre-ulcerative corn or callous    3.  Type 2 diabetes mellitus with diabetic polyneuropathy, with long-term current use of insulin ([facility] [address]    4. Foot deformity, left    5. Benign neoplasm of skin of foot, left       No orders of the defined types were placed in this encounter. Q7   []Yes    []No                Q8   [x]Yes    []No                     Q9   []Yes    []No    Plan:  Pt was evaluated and examined. Patient was given personalized discharge instructions. Nails 1-10 were debrided sharply in length and thickness with a nipper and , without incident. Pt will follow up in 3 months or sooner if any problems arise. Diagnosis was discussed with the pt and all of their questions were answered in detail. Proper foot hygiene and care was discussed with the pt. Informed patient on proper diabetic foot care and importance of tight glycemic control. Patient to check feet daily and contact the office with any questions/problems/concerns. Other comorbidity noted and will be managed by PCP. Diabetic foot examination performed this visit. The exam included neurological sensory exam, a 10-g monofilament and pinprick sensation, vibration using a 128-Hz tuning fork, ankle reflexes, visual skin inspection, vascular exam including assessment of pedal pulses, orthopedic exam for deformities, and shoe inspection. Increased risk factors noted on the diabetic foot exam include decreased sensory exam and peripheral neuropathy. Shoegear inspected and found to be appropriate size and wear. The patient has chosen surgical excision. The surrounding area on the left foot was anesthestized with a 1:1 mixture of 0.5% Marcaine plane and 2% Lidocaine with epinephrine at 1:200,000 . The foot was prepped and draped in the usual sterile manner. The lesion measured 0.9. The hyfrecator was utilized and the lesion was desiccated. A currette and tissue nipper were used to remove all offending tissue.  No further abnormal skin and tissue noted at the end of the procedure and care was taken to not penetrate the basement membrane. A dressing consisting of Silvadene cream and gauze was applied and reinforced with Coban. Capillary fill time was less than 3 seconds to all digits. The patient tolerated the procedure well without any apparent complications.   Post operative instructions were dispensed along with a prescription for silvadene cream.        4/24/2019    Electronically signed by Janey Chandler DPM on 4/24/2019 at 1:51 PM

## 2019-04-26 ENCOUNTER — APPOINTMENT (OUTPATIENT)
Dept: CT IMAGING | Age: 84
DRG: 035 | End: 2019-04-26
Payer: MEDICARE

## 2019-04-26 ENCOUNTER — APPOINTMENT (OUTPATIENT)
Dept: CT IMAGING | Age: 84
DRG: 063 | End: 2019-04-26
Payer: MEDICARE

## 2019-04-26 ENCOUNTER — HOSPITAL ENCOUNTER (EMERGENCY)
Age: 84
Discharge: HOME | DRG: 035 | End: 2019-04-26
Payer: MEDICARE

## 2019-04-26 ENCOUNTER — HOSPITAL ENCOUNTER (INPATIENT)
Age: 84
LOS: 1 days | Discharge: ANOTHER HOSPITAL | DRG: 063 | End: 2019-04-27
Attending: EMERGENCY MEDICINE | Admitting: FAMILY MEDICINE
Payer: MEDICARE

## 2019-04-26 DIAGNOSIS — R41.82 ALTERED MENTAL STATUS, UNSPECIFIED ALTERED MENTAL STATUS TYPE: ICD-10-CM

## 2019-04-26 DIAGNOSIS — R29.704 NIHSS SCORE 4: Primary | ICD-10-CM

## 2019-04-26 PROBLEM — Z92.82 RECEIVED INTRAVENOUS TISSUE PLASMINOGEN ACTIVATOR (T-PA) IN EMERGENCY DEPARTMENT: Status: ACTIVE | Noted: 2019-04-26

## 2019-04-26 LAB
% CKMB: 3.6 % (ref 0–3.5)
ABSOLUTE EOS #: 0.4 K/UL (ref 0–0.4)
ABSOLUTE IMMATURE GRANULOCYTE: ABNORMAL K/UL (ref 0–0.3)
ABSOLUTE LYMPH #: 2 K/UL (ref 1–4.8)
ABSOLUTE MONO #: 0.7 K/UL (ref 0.1–1.3)
ANION GAP SERPL CALCULATED.3IONS-SCNC: 12 MMOL/L (ref 9–17)
BASOPHILS # BLD: 0 % (ref 0–2)
BASOPHILS ABSOLUTE: 0 K/UL (ref 0–0.2)
BUN BLDV-MCNC: 23 MG/DL (ref 8–23)
BUN/CREAT BLD: ABNORMAL (ref 9–20)
CALCIUM SERPL-MCNC: 9 MG/DL (ref 8.6–10.4)
CHLORIDE BLD-SCNC: 104 MMOL/L (ref 98–107)
CK MB: 1.6 NG/ML
CKMB INTERPRETATION: ABNORMAL
CO2: 22 MMOL/L (ref 20–31)
CREAT SERPL-MCNC: 1.38 MG/DL (ref 0.7–1.2)
DIFFERENTIAL TYPE: ABNORMAL
EOSINOPHILS RELATIVE PERCENT: 5 % (ref 0–4)
GFR AFRICAN AMERICAN: 59 ML/MIN
GFR NON-AFRICAN AMERICAN: 43 ML/MIN
GFR NON-AFRICAN AMERICAN: 49 ML/MIN
GFR SERPL CREATININE-BSD FRML MDRD: 53 ML/MIN
GFR SERPL CREATININE-BSD FRML MDRD: ABNORMAL ML/MIN/{1.73_M2}
GLUCOSE BLD-MCNC: 104 MG/DL (ref 70–99)
GLUCOSE BLD-MCNC: 111 MG/DL (ref 75–110)
GLUCOSE BLD-MCNC: 136 MG/DL (ref 75–110)
HCT VFR BLD CALC: 38.3 % (ref 41–53)
HEMOGLOBIN: 13.1 G/DL (ref 13.5–17.5)
IMMATURE GRANULOCYTES: ABNORMAL %
INR BLD: 1
LYMPHOCYTES # BLD: 24 % (ref 24–44)
MCH RBC QN AUTO: 30.3 PG (ref 26–34)
MCHC RBC AUTO-ENTMCNC: 34.2 G/DL (ref 31–37)
MCV RBC AUTO: 88.8 FL (ref 80–100)
MONOCYTES # BLD: 9 % (ref 1–7)
MYOGLOBIN: 45 NG/ML (ref 28–72)
NRBC AUTOMATED: ABNORMAL PER 100 WBC
PARTIAL THROMBOPLASTIN TIME: 30.2 SEC (ref 24–36)
PDW BLD-RTO: 14.8 % (ref 11.5–14.9)
PLATELET # BLD: 286 K/UL (ref 150–450)
PLATELET ESTIMATE: ABNORMAL
PMV BLD AUTO: 7.3 FL (ref 6–12)
POC CREATININE: 1.53 MG/DL (ref 0.51–1.19)
POTASSIUM SERPL-SCNC: 4.3 MMOL/L (ref 3.7–5.3)
PROTHROMBIN TIME: 12.9 SEC (ref 11.8–14.6)
RBC # BLD: 4.31 M/UL (ref 4.5–5.9)
RBC # BLD: ABNORMAL 10*6/UL
SEG NEUTROPHILS: 62 % (ref 36–66)
SEGMENTED NEUTROPHILS ABSOLUTE COUNT: 5.3 K/UL (ref 1.3–9.1)
SODIUM BLD-SCNC: 138 MMOL/L (ref 135–144)
TOTAL CK: 45 U/L (ref 39–308)
TROPONIN INTERP: ABNORMAL
TROPONIN T: ABNORMAL NG/ML
TROPONIN, HIGH SENSITIVITY: 18 NG/L (ref 0–22)
WBC # BLD: 8.3 K/UL (ref 3.5–11)
WBC # BLD: ABNORMAL 10*3/UL

## 2019-04-26 PROCEDURE — 6360000002 HC RX W HCPCS: Performed by: STUDENT IN AN ORGANIZED HEALTH CARE EDUCATION/TRAINING PROGRAM

## 2019-04-26 PROCEDURE — 70496 CT ANGIOGRAPHY HEAD: CPT

## 2019-04-26 PROCEDURE — 96374 THER/PROPH/DIAG INJ IV PUSH: CPT

## 2019-04-26 PROCEDURE — 82553 CREATINE MB FRACTION: CPT

## 2019-04-26 PROCEDURE — 93005 ELECTROCARDIOGRAM TRACING: CPT

## 2019-04-26 PROCEDURE — 80048 BASIC METABOLIC PNL TOTAL CA: CPT

## 2019-04-26 PROCEDURE — 85025 COMPLETE CBC W/AUTO DIFF WBC: CPT

## 2019-04-26 PROCEDURE — 99285 EMERGENCY DEPT VISIT HI MDM: CPT

## 2019-04-26 PROCEDURE — 85610 PROTHROMBIN TIME: CPT

## 2019-04-26 PROCEDURE — 70498 CT ANGIOGRAPHY NECK: CPT

## 2019-04-26 PROCEDURE — 3E03317 INTRODUCTION OF OTHER THROMBOLYTIC INTO PERIPHERAL VEIN, PERCUTANEOUS APPROACH: ICD-10-PCS | Performed by: EMERGENCY MEDICINE

## 2019-04-26 PROCEDURE — 84484 ASSAY OF TROPONIN QUANT: CPT

## 2019-04-26 PROCEDURE — 82565 ASSAY OF CREATININE: CPT

## 2019-04-26 PROCEDURE — 36415 COLL VENOUS BLD VENIPUNCTURE: CPT

## 2019-04-26 PROCEDURE — 2000000000 HC ICU R&B

## 2019-04-26 PROCEDURE — 85730 THROMBOPLASTIN TIME PARTIAL: CPT

## 2019-04-26 PROCEDURE — 81001 URINALYSIS AUTO W/SCOPE: CPT

## 2019-04-26 PROCEDURE — 82550 ASSAY OF CK (CPK): CPT

## 2019-04-26 PROCEDURE — 82947 ASSAY GLUCOSE BLOOD QUANT: CPT

## 2019-04-26 PROCEDURE — 70450 CT HEAD/BRAIN W/O DYE: CPT

## 2019-04-26 PROCEDURE — 83874 ASSAY OF MYOGLOBIN: CPT

## 2019-04-26 RX ORDER — SODIUM CHLORIDE 0.9 % (FLUSH) 0.9 %
10 SYRINGE (ML) INJECTION PRN
Status: DISCONTINUED | OUTPATIENT
Start: 2019-04-26 | End: 2019-04-27 | Stop reason: HOSPADM

## 2019-04-26 RX ORDER — SODIUM CHLORIDE 0.9 % (FLUSH) 0.9 %
10 SYRINGE (ML) INJECTION EVERY 12 HOURS SCHEDULED
Status: DISCONTINUED | OUTPATIENT
Start: 2019-04-26 | End: 2019-04-27 | Stop reason: HOSPADM

## 2019-04-26 RX ORDER — ACETAMINOPHEN 325 MG/1
650 TABLET ORAL EVERY 4 HOURS PRN
Status: DISCONTINUED | OUTPATIENT
Start: 2019-04-26 | End: 2019-04-27 | Stop reason: HOSPADM

## 2019-04-26 RX ORDER — 0.9 % SODIUM CHLORIDE 0.9 %
80 INTRAVENOUS SOLUTION INTRAVENOUS ONCE
Status: COMPLETED | OUTPATIENT
Start: 2019-04-27 | End: 2019-04-27

## 2019-04-26 RX ORDER — DEXTROSE MONOHYDRATE 25 G/50ML
12.5 INJECTION, SOLUTION INTRAVENOUS
Status: ACTIVE | OUTPATIENT
Start: 2019-04-26 | End: 2019-04-26

## 2019-04-26 RX ADMIN — ALTEPLASE 8.4 MG: KIT at 19:30

## 2019-04-26 RX ADMIN — ALTEPLASE 75.3 MG: KIT at 19:31

## 2019-04-26 ASSESSMENT — ENCOUNTER SYMPTOMS
SHORTNESS OF BREATH: 0
VOMITING: 0
PHOTOPHOBIA: 0
SORE THROAT: 0
ABDOMINAL PAIN: 0
NAUSEA: 0
COUGH: 0
DIARRHEA: 0

## 2019-04-26 NOTE — ED NOTES
Report given to [facility], RN from ED. Report method in person   The following was reviewed with receiving RN:   Current vital signs:  BP (!) 157/77   Pulse 55   Temp 97.2 °F (36.2 °C) (Oral)   Resp 20   Ht 6' 2\" (1.88 m)   Wt 205 lb (93 kg)   SpO2 99%   BMI 26.32 kg/m²                MEWS Score: 1      Any medication or safety alerts were reviewed. Any pending diagnostics and notifications were also reviewed, as well as any safety concerns or issues, abnormal labs, abnormal imaging, and abnormal assessment findings. Questions were answered. TPA assessment reviewed with Alana and Alana brown under standing of where TPA assessments were to begin with her care.        Joseph Aburto RN  04/26/19 6223

## 2019-04-26 NOTE — ED NOTES
4/26/2019 6:19 PM    Patient: Mil Adams, 80 y.o., male Race:Cauc  Patient Address: [address] [facility] [address]  Incident Address:  [x]Same as patient address     [x] [facility]  [] [facility]  [] [facility] [place]   [] [facility]  [] [facility]  Patient Phone #: [phone]   Insurance: Payor: John Dumont /  /  /   Enrrique Subramanian:  []  Yes   [x]  No  Emergency Contact: Extended Emergency Contact Information  Primary Emergency Contact: FrancSajan  Address: [address] [facility] Phone: [phone]  Work Phone: [phone]  Mobile Phone: [phone]  Relation: Child  Secondary Emergency Contact: [facility]  Address: [address] [place] [address] Phone: [phone]  Work Phone: [phone]  Mobile Phone: [phone]  Relation: Spouse  MRN: [id number]  [facility]# [id number]  YOB: 1931  Primary Care Physician: Ford Martin DO  Advance Directive: [x] Full Code   []DNR-CC   []DNR-CCA    [facility]U Crew: Vinh Munoz - CT Tech, Libia Ped - Paramedic, Nanette Betancourt - RN    Pt Transported To:  [x] [place] [address]  [] [facility]  [] [facility]  [] [facility]  [] [facility]  [] [facility]  [facility] [] [place]     Was patient transported to closest facility?  [x]Yes  []No (if No specify reason)   []   Patient/family request    []   Divert to specialist       Response Code   [] 2  [x] 3  []   Change  [] 2  [] 3   Transport Code   [x] 2  [] 3  []   Change  [] 2  [] 3     Mileage:  [facility]   Total Miles 2.8     Call Received    Arrived Scene [phone]   At Patient [id number]   Decision to Scan [phone]   Departed Scene [address] 1809   In Service 1809         Allergies  [x] Updated/Reviewed by MSU  [] Historical Data Only  [] Unavailable  is allergic to bactrim [sulfamethoxazole-trimethoprim]; other; sulfa antibiotics; sulfur; and pcn [penicillins]. Medications  [] Updated/Reviewed by MSU  [x] Historical Data Only  [] Unavailable  Prior to Admission medications    Medication Sig Start Date End Date Taking? Authorizing Provider   clopidogrel (PLAVIX) 75 MG tablet  4/21/19   Historical Provider, MD   fluticasone (FLONASE) 50 MCG/ACT nasal spray 2 sprays by Nasal route daily 2 SPRAYS IN EACH NOSTRIL. 8/7/18 9/6/18  Adrien Walker DO   montelukast (SINGULAIR) 10 MG tablet  8/15/17   Historical Provider, MD   metoprolol tartrate (LOPRESSOR) 50 MG tablet Take 50 mg by mouth daily  5/6/17   Historical Provider, MD   omeprazole (PRILOSEC) 20 MG delayed release capsule Take 20 mg by mouth Daily Indications: ([facility] Mail order)     Historical Provider, MD   budesonide-formoterol (SYMBICORT) 160-4.5 MCG/ACT AERO Inhale 1 puff into the lungs 2 times daily Indications: ([facility] Mail order)     Historical Provider, MD   LANTUS SOLOSTAR 100 UNIT/ML injection pen Inject 60 Units into the skin daily  Patient taking differently: Inject 55 Units into the skin daily  7/16/16   Tamara Ruiz MD   BD ULTRA-FINE PEN NEEDLES 29G X 12.7MM MISC Inject 1 each into the skin daily  4/7/16   Historical Provider, MD   loperamide (IMODIUM) 2 MG capsule Take 2 mg by mouth 4 times daily as needed.       Historical Provider, MD    Scheduled Meds:  Continuous Infusions:  PRN Meds:.  Past Medical History  [] Updated/Reviewed by MSU  [x] Historical Data Only  [] Unavailable   has a past medical history of Arthritis, Asthma, Cystitis, Diabetic neuropathy ([facility] [address] Diarrhea, Dizziness, GERD (gastroesophageal reflux disease), H/O acute bronchitis, [place] (hard of hearing), Hx-TIA (transient ischemic attack), Hypertension, Kyphosis of thoracolumbar region, Neurogenic claudication, Numbness and tingling, Onychomycosis, Prolonged emergence from general anesthesia, Spinal stenosis of lumbar region, Spondylolysis of lumbar region, Type II or unspecified type diabetes mellitus without mention of complication, not stated as uncontrolled, and Wears glasses. Patient Weight: 190lbs   [x]   Estimated   []   Stated          Vitals          Time BP Pulse   Resp  Rate N-normal  S-shallow  D-deep Monitor SpO2 O2    LPM BGL   Temp Pain                /57 64 18 N SR 98 RA 65     1735 154/84 66 16 N SR 96 RA 38     1745 162/102 62 18 N SR 95 RA                                   pupils GCS   Time R L E  4 V  5 M  6 T  15   1735 3 3 4 4 5 13   1745 3 3 4 5 6 15                                  NIH -   record on all transports and Q15 min after tPA administration    NIHSS       Time 1735 1745     1a. LOC - Arousal Status       1b. LOC - Questions 2      1c. LOC - Commands 1      2. Eye Movements (gaze)       3. Visual Fields       4. Facial Palsy 1 1     5a. Motor Left Arm 1      5b. Motor Right Arm       6a. Motor Left Leg 1      6b. Motor Right Leg       7. Limb Ataxia       8. Sensory       9. Language/Aphasia 2      10. Dysarthria 1 1     11. Neglect       TOTAL 9 2         Research:    RACE Score: 1 Time: 1149  StrokeVAN Score: neg Time:1745    Time Last Known Well: 1600    Narrative:    Dispatched to possible CVA per [facility]. Arrived to find [facility], 80 y.o., male c/o confusion and weakness. Report received from [address] [facility] at patients side.  at patient side via telemedicine unit. FD obtains a FSBG of 65 but all on scene are concerned that presents with stroke sx. Patient received the following medications prior to MSU arrival:none  Patient has the following lines prior to MSU arrival:none  Patient has the following airway placed prior to MSU arrival:n/a patent    Patient was transferred to cot via 2 person assist and secured with straps x3. Zoll ECG, SpO2, and NIBP applied and monitored throughout encounter. HOB maintained at 30 degrees. Patient was transferred to ambulance, cot secured in scan position. Non contrast CT scan performed. After scan cot secured in transport position. FSBG rechecked - 38. 1 amp D50 given IVP with immediate improvement of all sx. IV tPA inclusion/exclusion criteria reviewed with patient and physician. Candidate for IV tPA therapy? [] Yes   [x] No - due to the following exclusion criteria: hypoglycemia    Patient is being transported to [facility] . During transport patient rests comfortably. Cabin temp maintained at 70-72 °F throughout transport. Patient was transferred to bed 10 via 4 person sheet lift. . Patient care handoff completed with RN at bedside. Imaging:  Images were obtained onboard the MSU including:  [x] CT brain without contrast - see results below:      Ct Head Wo Contrast    Result Date: 4/26/2019  EXAMINATION: CT OF THE HEAD WITHOUT CONTRAST  4/26/2019 5:25 pm TECHNIQUE: CT of the head was performed without the administration of intravenous contrast. Dose modulation, iterative reconstruction, and/or weight based adjustment of the mA/kV was utilized to reduce the radiation dose to as low as reasonably achievable. COMPARISON: August 4, 2018 HISTORY: ORDERING SYSTEM PROVIDED HISTORY: STROKE TECHNOLOGIST PROVIDED HISTORY: FINDINGS: BRAIN/VENTRICLES: Ventricles sulci are stable. Artifact limits the posterior fossa. There is no mass effect on the 4th ventricle. Small calcifications in the posterior fossa are again noted. Multifocal low-density in the periventricular and subcortical white matter is noted bilaterally. Findings are slightly greater in the right frontal region extending posteriorly to the frontal parietal junction. There may be subtle extension to the right frontal cortex. No hyperdense arteries are noted. Vascular calcifications are noted. There is no hemorrhage ORBITS: The visualized portion of the orbits demonstrate no acute abnormality. SINUSES: The visualized paranasal sinuses and mastoid air cells demonstrate no acute abnormality.  SOFT TISSUES/SKULL:  No acute / Lab     Call Outcome:   [x]    Transport to Facility    []    Care Transferred to [facility]    []    Cancelled    []    Patient Refusal    []                           Mobile Stroke Unit    Electronically signed by Saul Way RN on 4/26/19 at 6:19 PM       Saul Way RN  04/26/19 0087

## 2019-04-26 NOTE — ED PROVIDER NOTES
[place] [facility] ICU  Emergency Department Encounter  EmergencyMedicine Resident     Pt Name:Angel Casey  MRN: [id number]  Mariamgfgregor 3/12/1931  Date of evaluation: 4/26/19  PCP:  Driss Vazquez DO    CHIEF COMPLAINT       Chief Complaint   Patient presents with    Altered Mental Status       HISTORY OF PRESENT ILLNESS  (Location/Symptom, Timing/Onset, Context/Setting, Quality, Duration, Modifying Factors, Severity.)      Venkatesh Bowden is a 80 y.o. male who presents via Mobile Stroke Unit with altered mental status, dysarthria, and left-sided weakness which has resolved. Last known well 3:30 PM.  Patient's wife found him around 4:30 PM laying down in the living room, mumbling, unable to form words. She checked his blood sugar at home and reports it was 120. EMS was called; patient's wife reports that on their initial assessment, she had left-sided weakness which is now resolved, and on their initial sugar check, his blood sugar was normal.  On multiple stroke units assessment, blood glucose was 37 and patient was given 1 amp of D50 with improvement in his mental status. On arrival to the emergency department, he is drowsy, requires repeat verbal stimulation to wake up and engage in conversation. He is confused. He denies any complaints. Comorbidities as below. Patient's wife reports he has had 5 ischemic strokes previously.      PAST MEDICAL / SURGICAL / SOCIAL / FAMILY HISTORY      has a past medical history of Arthritis, Asthma, Cystitis, Diabetic neuropathy ([address] Diarrhea, Dizziness, GERD (gastroesophageal reflux disease), H/O acute bronchitis, [place] (hard of hearing), Hx-TIA (transient ischemic attack), Hypertension, Kyphosis of thoracolumbar region, Neurogenic claudication, Numbness and tingling, Onychomycosis, Prolonged emergence from general anesthesia, Spinal stenosis of lumbar region, Spondylolysis of lumbar region, Type II or unspecified type diabetes mellitus without mention of complication, not stated as uncontrolled, and Wears glasses. has a past surgical history that includes Tonsillectomy; Cystocopy; Colonoscopy; Total knee arthroplasty (Left); Inguinal hernia repair (Bilateral); Cataract removal with implant (Bilateral); Lumbar spine surgery; Revision total knee arthroplasty (Left); and Total hip arthroplasty (Right, 07/13/2016).      Social History     Socioeconomic History    Marital status:      Spouse name: Not on file    Number of children: Not on file    Years of education: Not on file    Highest education level: Not on file   Occupational History    Not on file   Social Needs    Financial resource strain: Not on file    Food insecurity:     Worry: Not on file     Inability: Not on file    Transportation needs:     Medical: Not on file     Non-medical: Not on file   Tobacco Use    Smoking status: Former Smoker     Types: Cigarettes    Smokeless tobacco: Former User     Quit date: 7/6/1986    Tobacco comment: stopped 30 years ago    Substance and Sexual Activity    Alcohol use: No    Drug use: No    Sexual activity: Not on file   Lifestyle    Physical activity:     Days per week: Not on file     Minutes per session: Not on file    Stress: Not on file   Relationships    Social connections:     Talks on phone: Not on file     Gets together: Not on file     Attends Religion service: Not on file     Active member of club or organization: Not on file     Attends meetings of clubs or organizations: Not on file     Relationship status: Not on file    Intimate partner violence:     Fear of current or ex partner: Not on file     Emotionally abused: Not on file     Physically abused: Not on file     Forced sexual activity: Not on file   Other Topics Concern    Not on file   Social History Narrative    Not on file       Family History   Problem Relation Age of Onset    Emphysema Mother     Heart Attack Father     Coronary Art Dis Father     High Blood Pressure Father wound.   Neurological: Positive for speech difficulty and weakness. Negative for light-headedness, numbness and headaches. Psychiatric/Behavioral: Positive for confusion. PHYSICAL EXAM   (up to 7 for level 4, 8 or more for level 5)      INITIAL VITALS:   BP (!) 167/75   Pulse 57   Temp 98 °F (36.7 °C) (Temporal)   Resp 19   Ht 6' 2\" (1.88 m)   Wt 205 lb (93 kg)   SpO2 94%   BMI 26.32 kg/m²     Physical Exam   Gen. Appearance: drowsy, elderly male patient in no acute distress  HEENT: head atraumatic, normocephalic. Pupils equal and reactive to light. Oropharynx clear and moist.  Neck: Supple, normal range of motion. No lymphadenopathy. Pulmonary: Lungs clear to auscultation bilaterally. Equal air entry right left. Cardiovascular:  Heart sounds normal, no murmurs. Peripheral pulses strong, regular, equal.   Abdomen: Soft, nontender, nondistended. Bowel sounds normal. No palpable masses. .    Skin: Warm, dry, well perfused  Neurology: GCS 13 - opens eyes to verbal stimuli, confused, follows commands. Oriented to person, not oriented to time or place.    -Cranial nerve exam:                        CN III, IV, VI: EOM normal                        CN V: facial sensation normal                        CN VII: normal facial expressions - symmetrical and normal eyebrow raise, eye closure, smile                        CN IX, X: uvula midline, symmetrical palate motion                        CN XI: normal symmetrical shoulder raise                        CN XII: normal tongue movement; no deviation  -Peripheral nerve exam: Normal tone and power in all 4 extremities. No sensory deficits. INITIAL NIH STROKE SCALE    Time Performed:  6 PM     1a. Level of consciousness:  1 - not alert but arousable by minor stimulation to obey, answer or respond  1b. Level of consciousness questions:  2 - answers neither question correctly  1c.   Level of consciousness questions:  0 - performs both tasks correctly  2. Best Gaze:  0 - normal  3. Visual:  0 - no visual loss  4. Facial Palsy:  0 - normal symmetric movement  5a. Motor left arm:  0 - no drift, limb holds 90 (or 45) degrees for full 10 seconds  5b. Motor right arm:  0 - no drift, limb holds 90 (or 45) degrees for full 10 seconds  6a. Motor left le - no drift; leg holds 30 degree position for full 5 seconds  6b. Motor right le - no drift; leg holds 30 degree position for full 5 seconds  7. Limb Ataxia:  1 - present in one limb  8. Sensory:  0 - normal; no sensory loss  9. Best Language:  0 - no aphasia, normal  10. Dysarthria:  0 - normal  11.   Extinction and Inattention:  0 - no abnormality    TOTAL:  4          DIFFERENTIAL  DIAGNOSIS     PLAN (LABS / IMAGING / EKG):  Orders Placed This Encounter   Procedures    CTA HEAD W CONTRAST    CTA NECK W CONTRAST    CT Head WO Contrast    STROKE PANEL    Urinalysis with Microscopic    Troponin    DIET GENERAL;    Vital signs during and post alteplase (tPA)    Telemetry monitoring    Notify physician prior to alteplase (tPA)    Notify physician during and post alteplase (tPA)    Notify physician during and post alteplase (tPA)    Bedrest during and post alteplase (tPA)    Elevate HOB during and post alteplase (tPA)    Neuro checks during and post alteplase (tPA)    NIH Stroke Scale assessment by nurse    Implement alteplase (tPA) hemorrhage/bleeding precautions    Avoid antiplatelet and antithrombotic medications for 24 hours post administration of alteplase (tPA)    No Anticoagulants for 24 hours after alteplase (tPA)    Implement suspected intracerebral hemorrhage (ICH) guidelines    Assess for aphasia/dysphagia/severe dysarthria    Pulse Oximetry    Vital signs per unit routine    Notify physician    Notify physician    Up with assistance    Full Code    Inpatient consult to Stroke Team    Inpatient consult to Primary Care Provider   [facility] Inpatient consult to Neurology    Initiate Oxygen Therapy Protocol    POC Glucose Fingerstick    POC Glucose Fingerstick    Creatinine W/GFR Point of Care    EKG 12 Lead    Initiate minimum 2 IV lines for alteplase (tPA) infusion    PATIENT STATUS (FROM ED OR OR/PROCEDURAL) Inpatient    Fall precautions       MEDICATIONS ORDERED:  Orders Placed This Encounter   Medications    sodium chloride flush 0.9 % injection 10 mL    sodium chloride flush 0.9 % injection 10 mL    dextrose 50 % solution 12.5 g    FOLLOWED BY Linked Order Group     alteplase (ACTIVASE) injection 8.4 mg     alteplase (ACTIVASE) injection 75.3 mg    sodium chloride flush 0.9 % injection 10 mL    sodium chloride flush 0.9 % injection 10 mL    acetaminophen (TYLENOL) tablet 650 mg    0.9 % sodium chloride bolus    ioversol (OPTIRAY) 74 % injection 75 mL    sodium chloride flush 0.9 % injection 10 mL       DDX: stroke, hypoglycemia, tox, electrolyte abnormality, infectious process    DIAGNOSTIC RESULTS / EMERGENCY DEPARTMENT COURSE / MDM     LABS:  Results for orders placed or performed during the hospital encounter of 04/26/19   STROKE PANEL   Result Value Ref Range    WBC 8.3 3.5 - 11.0 k/uL    RBC 4.31 (L) 4.5 - 5.9 m/uL    Hemoglobin 13.1 (L) 13.5 - 17.5 g/dL    Hematocrit 38.3 (L) 41 - 53 %    MCV 88.8 80 - 100 fL    MCH 30.3 26 - 34 pg    MCHC 34.2 31 - 37 g/dL    RDW 14.8 11.5 - 14.9 %    Platelets 553 401 - 864 k/uL    MPV 7.3 6.0 - 12.0 fL    NRBC Automated NOT REPORTED per 100 WBC    Differential Type NOT REPORTED     Seg Neutrophils 62 36 - 66 %    Lymphocytes 24 24 - 44 %    Monocytes 9 (H) 1 - 7 %    Eosinophils % 5 (H) 0 - 4 %    Basophils 0 0 - 2 %    Immature Granulocytes NOT REPORTED 0 %    Segs Absolute 5.30 1.3 - 9.1 k/uL    Absolute Lymph # 2.00 1.0 - 4.8 k/uL    Absolute Mono # 0.70 0.1 - 1.3 k/uL    Absolute Eos # 0.40 0.0 - 0.4 k/uL    Basophils # 0.00 0.0 - 0.2 k/uL    Absolute Immature Granulocyte NOT REPORTED 0.00 - 0.30 k/uL    WBC Morphology NOT REPORTED     RBC Morphology NOT REPORTED     Platelet Estimate NOT REPORTED     Protime 12.9 11.8 - 14.6 sec    INR 1.0     PTT 30.2 24.0 - 36.0 sec    Myoglobin 45 28 - 72 ng/mL    Troponin, High Sensitivity 18 0 - 22 ng/L    Troponin T NOT REPORTED <0.03 ng/mL    Troponin Interp NOT REPORTED     Glucose 104 (H) 70 - 99 mg/dL    BUN 23 8 - 23 mg/dL    CREATININE 1.38 (H) 0.70 - 1.20 mg/dL    Bun/Cre Ratio NOT REPORTED 9 - 20    Calcium 9.0 8.6 - 10.4 mg/dL    Sodium 138 135 - 144 mmol/L    Potassium 4.3 3.7 - 5.3 mmol/L    Chloride 104 98 - 107 mmol/L    CO2 22 20 - 31 mmol/L    Anion Gap 12 9 - 17 mmol/L    GFR Non-African American 49 (L) >60 mL/min    GFR  59 (L) >60 mL/min    GFR Comment          GFR Staging NOT REPORTED     Total CK 45 39 - 308 U/L    CK-MB 1.6 <10.5 ng/mL    % CKMB 3.6 (H) 0.0 - 3.5 %    CKMB Interpretation NORMAL ISOENZYME PATTERN    POC Glucose Fingerstick   Result Value Ref Range    POC Glucose 136 (H) 75 - 110 mg/dL   POC Glucose Fingerstick   Result Value Ref Range    POC Glucose 111 (H) 75 - 110 mg/dL   Creatinine W/GFR Point of Care   Result Value Ref Range    POC Creatinine 1.53 (H) 0.51 - 1.19 mg/dL    GFR Comment 53 (L) >60 mL/min    GFR Non- 43 (L) >60 mL/min    GFR Comment         EKG 12 Lead   Result Value Ref Range    Ventricular Rate 57 BPM    Atrial Rate 57 BPM    P-R Interval 210 ms    QRS Duration 88 ms    Q-T Interval 434 ms    QTc Calculation (Bazett) 422 ms    P Axis 19 degrees    R Axis -12 degrees    T Axis 15 degrees       IMPRESSION: 80year old patient presents with altered mental status, weakness, and dysarthria. Patient is afebrile, hemodynamically stable, and in no acute distress. He is confused and drowsy, with GCS 13. Initial NIH stroke scale for 4 drowsiness, disorientation, and ataxia.   Patient was found by mobile stroke unit to be hypoglycemic, and he did have improvement after one amp of D50 administration was completed within window however was delayed from initial presentation due to patient's hypoglycemia, and evaluation of patient to ensure his symptoms were not due to hypoglycemia. Case discussed with Dr. Emilie Ybarra, patient's primary care provider, who has accepted patient for admission to the ICU for close monitoring status post tPA. Dr. Elvira Varela recommendations discussed. Patient admitted. PROCEDURES:  None    CONSULTS:  IP CONSULT TO STROKE TEAM  IP CONSULT TO PRIMARY CARE PROVIDER  IP CONSULT TO NEUROLOGY    CRITICAL CARE:  None    FINAL IMPRESSION      1. Nihss score 4    2.  Altered mental status, unspecified altered mental status type          DISPOSITION / PLAN     DISPOSITION Admitted 04/26/2019 07:51:37 PM      PATIENT REFERRED TO:  Ghassan Doan 73            DISCHARGE MEDICATIONS:  Current Discharge Medication List          Dana Tomlin MD  Emergency Medicine Resident    (Please note that portions of thisnote were completed with a voice recognition program.  Efforts were made to edit the dictations but occasionally words are mis-transcribed.)        Dana Tomlin MD  04/27/19 Meghann Kelly MD  05/04/19 1603       Dana Tomlin MD  05/04/19 1604       Dana Tomlin MD  05/11/19 0209

## 2019-04-26 NOTE — ED NOTES
Bed: 10  Expected date:   Expected time:   Means of arrival:   Comments:  OLY Boykin RN  04/26/19 3429

## 2019-04-26 NOTE — ED PROVIDER NOTES
[address] ED  eMERGENCY dEPARTMENT eNCOUnter   Attending Attestation     Pt Name: Nickie Pierce  MRN: [id number]  Armsjosegfurt 3/12/1931  Date of evaluation: 4/26/19       Nickie Pierce is a 80 y.o. male who presents with Altered Mental Status      History:   Patient is here with sudden onset of altered mental status difficulty speaking left facial droop and left arm weakness. Patient initially had a blood sugar that was normal within mobile stroke arrived and noted that it was low given amp of dextrose which did improve some of his symptoms by wife states he still not quite himself still altered not speaking correctly and does still have a little bit of a left facial droop. Patient denies headache blurry vision chest pain or shortness of breath. Exam: Vitals:   Vitals:    04/26/19 1757   BP: (!) 143/70   Pulse: 57   Resp: 16   Temp: 97.2 °F (36.2 °C)   TempSrc: Oral   SpO2: 97%   Weight: 205 lb (93 kg)   Height: 6' 2\" (1.88 m)     Heart regular rate rhythm no murmurs. Lungs clear auscultation bilaterally. Neurologically patient has a left-sided facial droop slow to respond but answers appropriately but is disoriented. Patient has normal strength and sensation in the 4 extremities. I performed a history and physical examination of the patient and discussed management with the resident. I reviewed the residents note and agree with the documented findings and plan of care. Any areas of disagreement are noted on the chart. I was personally present for the key portions of any procedures. I have documented in the chart those procedures where I was not present during the key portions. I have personally reviewed all images and agree with the resident's interpretation. I have reviewed the emergency nurses triage note. I agree with the chief complaint, past medical history, past surgical history, allergies, medications, social and family history as documented unless otherwise noted below. Documentation of the HPI, Physical Exam and Medical Decision Making performed by medical students or scribes is based on my personal performance of the HPI, PE and MDM. I personally evaluated and examined the patient in conjunction with the APC and agree with the assessment, treatment plan, and disposition of the patient as recorded by the APC. Additional findings are as noted.     Haritha Hartmann MD  Attending Emergency  Physician              Mya Renner MD  04/26/19 6209

## 2019-04-26 NOTE — ED NOTES
Pt arrived in ED with c/o possible stroke symptoms. Pt was at home when his wife noticed the pt was unable to communicate with her. Pt's wife states pt was mumbling and was unable to keep eye contact. Pt's wife states she checked his blood sugar and the reading was 126. Pt's wife states she called 46 and when MSU arrived pt's BSG reading was 38. MSU administered an amp of D50 and pt became more alert and responsive. Pt is alert and confused upon arrival. Pt is able to follow commands. Pt's wife at bedside.       Norbert Beaulieu RN  04/26/19 4805

## 2019-04-27 ENCOUNTER — APPOINTMENT (OUTPATIENT)
Dept: GENERAL RADIOLOGY | Age: 84
DRG: 035 | End: 2019-04-27
Attending: PSYCHIATRY & NEUROLOGY
Payer: MEDICARE

## 2019-04-27 ENCOUNTER — APPOINTMENT (OUTPATIENT)
Dept: MRI IMAGING | Age: 84
DRG: 035 | End: 2019-04-27
Attending: PSYCHIATRY & NEUROLOGY
Payer: MEDICARE

## 2019-04-27 ENCOUNTER — HOSPITAL ENCOUNTER (INPATIENT)
Age: 84
LOS: 4 days | Discharge: NURSING, REHAB OR LONG-TERM CARE | DRG: 035 | End: 2019-05-01
Attending: PSYCHIATRY & NEUROLOGY
Payer: MEDICARE

## 2019-04-27 ENCOUNTER — APPOINTMENT (OUTPATIENT)
Dept: CT IMAGING | Age: 84
DRG: 063 | End: 2019-04-27
Payer: MEDICARE

## 2019-04-27 VITALS
WEIGHT: 202 LBS | DIASTOLIC BLOOD PRESSURE: 70 MMHG | BODY MASS INDEX: 25.12 KG/M2 | SYSTOLIC BLOOD PRESSURE: 171 MMHG | OXYGEN SATURATION: 94 % | RESPIRATION RATE: 20 BRPM | HEIGHT: 75 IN | TEMPERATURE: 97.4 F | HEART RATE: 61 BPM

## 2019-04-27 PROBLEM — I63.9 ISCHEMIC STROKE (HCC): Status: ACTIVE | Noted: 2019-04-27

## 2019-04-27 LAB
-: NORMAL
ABSOLUTE EOS #: 0.36 K/UL (ref 0–0.44)
ABSOLUTE EOS #: 0.4 K/UL (ref 0–0.4)
ABSOLUTE IMMATURE GRANULOCYTE: 0.03 K/UL (ref 0–0.3)
ABSOLUTE IMMATURE GRANULOCYTE: ABNORMAL K/UL (ref 0–0.3)
ABSOLUTE LYMPH #: 2.4 K/UL (ref 1–4.8)
ABSOLUTE LYMPH #: 2.49 K/UL (ref 1.1–3.7)
ABSOLUTE MONO #: 0.6 K/UL (ref 0.1–1.3)
ABSOLUTE MONO #: 0.73 K/UL (ref 0.1–1.2)
AMORPHOUS: NORMAL
ANION GAP SERPL CALCULATED.3IONS-SCNC: 11 MMOL/L (ref 9–17)
ANION GAP SERPL CALCULATED.3IONS-SCNC: 9 MMOL/L (ref 9–17)
BACTERIA: NORMAL
BASOPHILS # BLD: 0 % (ref 0–2)
BASOPHILS # BLD: 1 % (ref 0–2)
BASOPHILS ABSOLUTE: 0 K/UL (ref 0–0.2)
BASOPHILS ABSOLUTE: 0.04 K/UL (ref 0–0.2)
BILIRUBIN URINE: NEGATIVE
BUN BLDV-MCNC: 14 MG/DL (ref 8–23)
BUN BLDV-MCNC: 18 MG/DL (ref 8–23)
BUN/CREAT BLD: ABNORMAL (ref 9–20)
BUN/CREAT BLD: ABNORMAL (ref 9–20)
CALCIUM SERPL-MCNC: 8.2 MG/DL (ref 8.6–10.4)
CALCIUM SERPL-MCNC: 9 MG/DL (ref 8.6–10.4)
CASTS UA: NORMAL /LPF
CHLORIDE BLD-SCNC: 106 MMOL/L (ref 98–107)
CHLORIDE BLD-SCNC: 108 MMOL/L (ref 98–107)
CO2: 20 MMOL/L (ref 20–31)
CO2: 23 MMOL/L (ref 20–31)
COLOR: YELLOW
COMMENT UA: NORMAL
CORTISOL COLLECTION INFO: NORMAL
CORTISOL: 9.5 UG/DL (ref 2.7–18.4)
CREAT SERPL-MCNC: 1.02 MG/DL (ref 0.7–1.2)
CREAT SERPL-MCNC: 1.09 MG/DL (ref 0.7–1.2)
CRYSTALS, UA: NORMAL /HPF
DIFFERENTIAL TYPE: ABNORMAL
DIFFERENTIAL TYPE: ABNORMAL
EOSINOPHILS RELATIVE PERCENT: 4 % (ref 1–4)
EOSINOPHILS RELATIVE PERCENT: 6 % (ref 0–4)
EPITHELIAL CELLS UA: NORMAL /HPF
GFR AFRICAN AMERICAN: >60 ML/MIN
GFR AFRICAN AMERICAN: >60 ML/MIN
GFR NON-AFRICAN AMERICAN: >60 ML/MIN
GFR NON-AFRICAN AMERICAN: >60 ML/MIN
GFR SERPL CREATININE-BSD FRML MDRD: ABNORMAL ML/MIN/{1.73_M2}
GLUCOSE BLD-MCNC: 102 MG/DL (ref 75–110)
GLUCOSE BLD-MCNC: 110 MG/DL (ref 75–110)
GLUCOSE BLD-MCNC: 116 MG/DL (ref 75–110)
GLUCOSE BLD-MCNC: 123 MG/DL (ref 75–110)
GLUCOSE BLD-MCNC: 135 MG/DL (ref 75–110)
GLUCOSE BLD-MCNC: 153 MG/DL (ref 70–99)
GLUCOSE BLD-MCNC: 29 MG/DL (ref 75–110)
GLUCOSE BLD-MCNC: 31 MG/DL (ref 75–110)
GLUCOSE BLD-MCNC: 62 MG/DL (ref 75–110)
GLUCOSE BLD-MCNC: 83 MG/DL (ref 70–99)
GLUCOSE URINE: NEGATIVE
HCT VFR BLD CALC: 37.5 % (ref 41–53)
HCT VFR BLD CALC: 38.6 % (ref 40.7–50.3)
HEMOGLOBIN: 12.3 G/DL (ref 13.5–17.5)
HEMOGLOBIN: 12.4 G/DL (ref 13–17)
IMMATURE GRANULOCYTES: 0 %
IMMATURE GRANULOCYTES: ABNORMAL %
KETONES, URINE: NEGATIVE
LACTIC ACID, WHOLE BLOOD: 0.8 MMOL/L (ref 0.7–2.1)
LACTIC ACID: NORMAL MMOL/L
LEUKOCYTE ESTERASE, URINE: NEGATIVE
LYMPHOCYTES # BLD: 26 % (ref 24–43)
LYMPHOCYTES # BLD: 32 % (ref 24–44)
MCH RBC QN AUTO: 29.4 PG (ref 25.2–33.5)
MCH RBC QN AUTO: 29.4 PG (ref 26–34)
MCHC RBC AUTO-ENTMCNC: 32.1 G/DL (ref 28.4–34.8)
MCHC RBC AUTO-ENTMCNC: 32.7 G/DL (ref 31–37)
MCV RBC AUTO: 90 FL (ref 80–100)
MCV RBC AUTO: 91.5 FL (ref 82.6–102.9)
MONOCYTES # BLD: 8 % (ref 1–7)
MONOCYTES # BLD: 8 % (ref 3–12)
MRSA, DNA, NASAL: NORMAL
MUCUS: NORMAL
NITRITE, URINE: NEGATIVE
NRBC AUTOMATED: 0 PER 100 WBC
NRBC AUTOMATED: ABNORMAL PER 100 WBC
OTHER OBSERVATIONS UA: NORMAL
PDW BLD-RTO: 14.3 % (ref 11.8–14.4)
PDW BLD-RTO: 15 % (ref 11.5–14.9)
PH UA: 5.5 (ref 5–8)
PLATELET # BLD: 259 K/UL (ref 138–453)
PLATELET # BLD: 259 K/UL (ref 150–450)
PLATELET ESTIMATE: ABNORMAL
PLATELET ESTIMATE: ABNORMAL
PMV BLD AUTO: 7.2 FL (ref 6–12)
PMV BLD AUTO: 9.2 FL (ref 8.1–13.5)
POTASSIUM SERPL-SCNC: 3.7 MMOL/L (ref 3.7–5.3)
POTASSIUM SERPL-SCNC: 4.3 MMOL/L (ref 3.7–5.3)
PROTEIN UA: NEGATIVE
RBC # BLD: 4.17 M/UL (ref 4.5–5.9)
RBC # BLD: 4.22 M/UL (ref 4.21–5.77)
RBC # BLD: ABNORMAL 10*6/UL
RBC # BLD: ABNORMAL 10*6/UL
RBC UA: NORMAL /HPF
RENAL EPITHELIAL, UA: NORMAL /HPF
SEG NEUTROPHILS: 53 % (ref 36–66)
SEG NEUTROPHILS: 62 % (ref 36–65)
SEGMENTED NEUTROPHILS ABSOLUTE COUNT: 3.9 K/UL (ref 1.3–9.1)
SEGMENTED NEUTROPHILS ABSOLUTE COUNT: 6.06 K/UL (ref 1.5–8.1)
SODIUM BLD-SCNC: 137 MMOL/L (ref 135–144)
SODIUM BLD-SCNC: 140 MMOL/L (ref 135–144)
SPECIFIC GRAVITY UA: 1.02 (ref 1–1.03)
SPECIMEN DESCRIPTION: NORMAL
TRICHOMONAS: NORMAL
TURBIDITY: CLEAR
URINE HGB: NEGATIVE
UROBILINOGEN, URINE: NORMAL
WBC # BLD: 7.3 K/UL (ref 3.5–11)
WBC # BLD: 9.7 K/UL (ref 3.5–11.3)
WBC # BLD: ABNORMAL 10*3/UL
WBC # BLD: ABNORMAL 10*3/UL
WBC UA: NORMAL /HPF
YEAST: NORMAL

## 2019-04-27 PROCEDURE — 82947 ASSAY GLUCOSE BLOOD QUANT: CPT

## 2019-04-27 PROCEDURE — 99223 1ST HOSP IP/OBS HIGH 75: CPT | Performed by: PSYCHIATRY & NEUROLOGY

## 2019-04-27 PROCEDURE — 70553 MRI BRAIN STEM W/O & W/DYE: CPT

## 2019-04-27 PROCEDURE — 2580000003 HC RX 258: Performed by: STUDENT IN AN ORGANIZED HEALTH CARE EDUCATION/TRAINING PROGRAM

## 2019-04-27 PROCEDURE — 87641 MR-STAPH DNA AMP PROBE: CPT

## 2019-04-27 PROCEDURE — 37195 THROMBOLYTIC THERAPY STROKE: CPT

## 2019-04-27 PROCEDURE — 6360000002 HC RX W HCPCS

## 2019-04-27 PROCEDURE — 99291 CRITICAL CARE FIRST HOUR: CPT | Performed by: PSYCHIATRY & NEUROLOGY

## 2019-04-27 PROCEDURE — 82533 TOTAL CORTISOL: CPT

## 2019-04-27 PROCEDURE — 85025 COMPLETE CBC W/AUTO DIFF WBC: CPT

## 2019-04-27 PROCEDURE — 74018 RADEX ABDOMEN 1 VIEW: CPT

## 2019-04-27 PROCEDURE — 6360000004 HC RX CONTRAST MEDICATION: Performed by: STUDENT IN AN ORGANIZED HEALTH CARE EDUCATION/TRAINING PROGRAM

## 2019-04-27 PROCEDURE — 6370000000 HC RX 637 (ALT 250 FOR IP): Performed by: STUDENT IN AN ORGANIZED HEALTH CARE EDUCATION/TRAINING PROGRAM

## 2019-04-27 PROCEDURE — 71045 X-RAY EXAM CHEST 1 VIEW: CPT

## 2019-04-27 PROCEDURE — 36415 COLL VENOUS BLD VENIPUNCTURE: CPT

## 2019-04-27 PROCEDURE — 2000000003 HC NEURO ICU R&B

## 2019-04-27 PROCEDURE — A9576 INJ PROHANCE MULTIPACK: HCPCS | Performed by: STUDENT IN AN ORGANIZED HEALTH CARE EDUCATION/TRAINING PROGRAM

## 2019-04-27 PROCEDURE — 94762 N-INVAS EAR/PLS OXIMTRY CONT: CPT

## 2019-04-27 PROCEDURE — 80048 BASIC METABOLIC PNL TOTAL CA: CPT

## 2019-04-27 PROCEDURE — 2580000003 HC RX 258: Performed by: FAMILY MEDICINE

## 2019-04-27 PROCEDURE — 83003 ASSAY GROWTH HORMONE (HGH): CPT

## 2019-04-27 PROCEDURE — 70450 CT HEAD/BRAIN W/O DYE: CPT

## 2019-04-27 PROCEDURE — 83605 ASSAY OF LACTIC ACID: CPT

## 2019-04-27 PROCEDURE — 83036 HEMOGLOBIN GLYCOSYLATED A1C: CPT

## 2019-04-27 PROCEDURE — 6360000004 HC RX CONTRAST MEDICATION: Performed by: PSYCHIATRY & NEUROLOGY

## 2019-04-27 PROCEDURE — 2580000003 HC RX 258: Performed by: PSYCHIATRY & NEUROLOGY

## 2019-04-27 PROCEDURE — 99292 CRITICAL CARE ADDL 30 MIN: CPT | Performed by: PSYCHIATRY & NEUROLOGY

## 2019-04-27 RX ORDER — CLOPIDOGREL BISULFATE 75 MG/1
75 TABLET ORAL DAILY
Status: DISCONTINUED | OUTPATIENT
Start: 2019-04-28 | End: 2019-05-01 | Stop reason: HOSPADM

## 2019-04-27 RX ORDER — DEXTROSE MONOHYDRATE 25 G/50ML
INJECTION, SOLUTION INTRAVENOUS
Status: DISCONTINUED
Start: 2019-04-27 | End: 2019-04-27 | Stop reason: HOSPADM

## 2019-04-27 RX ORDER — DEXTROSE MONOHYDRATE 50 MG/ML
100 INJECTION, SOLUTION INTRAVENOUS PRN
Status: DISCONTINUED | OUTPATIENT
Start: 2019-04-27 | End: 2019-04-27 | Stop reason: CLARIF

## 2019-04-27 RX ORDER — DEXTROSE MONOHYDRATE 25 G/50ML
12.5 INJECTION, SOLUTION INTRAVENOUS PRN
Status: DISCONTINUED | OUTPATIENT
Start: 2019-04-27 | End: 2019-05-01 | Stop reason: HOSPADM

## 2019-04-27 RX ORDER — DEXTROSE MONOHYDRATE 25 G/50ML
12.5 INJECTION, SOLUTION INTRAVENOUS PRN
Status: DISCONTINUED | OUTPATIENT
Start: 2019-04-27 | End: 2019-04-27 | Stop reason: HOSPADM

## 2019-04-27 RX ORDER — MIDAZOLAM HYDROCHLORIDE 1 MG/ML
2 INJECTION INTRAMUSCULAR; INTRAVENOUS ONCE
Status: COMPLETED | OUTPATIENT
Start: 2019-04-27 | End: 2019-04-27

## 2019-04-27 RX ORDER — SODIUM CHLORIDE 0.9 % (FLUSH) 0.9 %
10 SYRINGE (ML) INJECTION PRN
Status: DISCONTINUED | OUTPATIENT
Start: 2019-04-27 | End: 2019-05-01 | Stop reason: HOSPADM

## 2019-04-27 RX ORDER — ATORVASTATIN CALCIUM 40 MG/1
40 TABLET, FILM COATED ORAL NIGHTLY
Status: DISCONTINUED | OUTPATIENT
Start: 2019-04-27 | End: 2019-05-01 | Stop reason: HOSPADM

## 2019-04-27 RX ORDER — NICOTINE POLACRILEX 4 MG
15 LOZENGE BUCCAL PRN
Status: DISCONTINUED | OUTPATIENT
Start: 2019-04-27 | End: 2019-04-27 | Stop reason: HOSPADM

## 2019-04-27 RX ORDER — ASPIRIN 81 MG/1
81 TABLET ORAL DAILY
Status: DISCONTINUED | OUTPATIENT
Start: 2019-04-28 | End: 2019-05-01 | Stop reason: HOSPADM

## 2019-04-27 RX ORDER — LABETALOL 20 MG/4 ML (5 MG/ML) INTRAVENOUS SYRINGE
5
Status: DISCONTINUED | OUTPATIENT
Start: 2019-04-27 | End: 2019-04-27 | Stop reason: HOSPADM

## 2019-04-27 RX ORDER — CLOPIDOGREL BISULFATE 75 MG/1
300 TABLET ORAL ONCE
Status: DISCONTINUED | OUTPATIENT
Start: 2019-04-27 | End: 2019-05-01 | Stop reason: HOSPADM

## 2019-04-27 RX ORDER — NICOTINE POLACRILEX 4 MG
15 LOZENGE BUCCAL PRN
Status: DISCONTINUED | OUTPATIENT
Start: 2019-04-27 | End: 2019-05-01 | Stop reason: HOSPADM

## 2019-04-27 RX ORDER — DEXTROSE MONOHYDRATE 25 G/50ML
25 INJECTION, SOLUTION INTRAVENOUS PRN
Status: DISCONTINUED | OUTPATIENT
Start: 2019-04-27 | End: 2019-04-27 | Stop reason: CLARIF

## 2019-04-27 RX ORDER — ONDANSETRON 2 MG/ML
4 INJECTION INTRAMUSCULAR; INTRAVENOUS EVERY 6 HOURS PRN
Status: DISCONTINUED | OUTPATIENT
Start: 2019-04-27 | End: 2019-05-01 | Stop reason: HOSPADM

## 2019-04-27 RX ORDER — DEXTROSE MONOHYDRATE 50 MG/ML
100 INJECTION, SOLUTION INTRAVENOUS PRN
Status: DISCONTINUED | OUTPATIENT
Start: 2019-04-27 | End: 2019-04-27 | Stop reason: HOSPADM

## 2019-04-27 RX ORDER — SODIUM CHLORIDE 0.9 % (FLUSH) 0.9 %
10 SYRINGE (ML) INJECTION EVERY 12 HOURS SCHEDULED
Status: DISCONTINUED | OUTPATIENT
Start: 2019-04-27 | End: 2019-05-01 | Stop reason: HOSPADM

## 2019-04-27 RX ORDER — DEXTROSE MONOHYDRATE 50 MG/ML
100 INJECTION, SOLUTION INTRAVENOUS PRN
Status: DISCONTINUED | OUTPATIENT
Start: 2019-04-27 | End: 2019-05-01 | Stop reason: HOSPADM

## 2019-04-27 RX ORDER — MIDAZOLAM HYDROCHLORIDE 1 MG/ML
INJECTION INTRAMUSCULAR; INTRAVENOUS
Status: COMPLETED
Start: 2019-04-27 | End: 2019-04-27

## 2019-04-27 RX ADMIN — Medication 10 ML: at 09:33

## 2019-04-27 RX ADMIN — GADOTERIDOL 19 ML: 279.3 INJECTION, SOLUTION INTRAVENOUS at 23:00

## 2019-04-27 RX ADMIN — DEXTROSE MONOHYDRATE 12.5 G: 500 INJECTION PARENTERAL at 06:00

## 2019-04-27 RX ADMIN — Medication 10 ML: at 20:58

## 2019-04-27 RX ADMIN — IOVERSOL 75 ML: 741 INJECTION INTRA-ARTERIAL; INTRAVENOUS at 00:01

## 2019-04-27 RX ADMIN — Medication 10 ML: at 00:02

## 2019-04-27 RX ADMIN — MIDAZOLAM HYDROCHLORIDE 2 MG: 1 INJECTION, SOLUTION INTRAMUSCULAR; INTRAVENOUS at 22:40

## 2019-04-27 RX ADMIN — DEXTROSE MONOHYDRATE 12.5 G: 500 INJECTION PARENTERAL at 01:10

## 2019-04-27 RX ADMIN — MIDAZOLAM HYDROCHLORIDE 2 MG: 1 INJECTION INTRAMUSCULAR; INTRAVENOUS at 22:40

## 2019-04-27 RX ADMIN — DESMOPRESSIN ACETATE 40 MG: 0.2 TABLET ORAL at 20:58

## 2019-04-27 RX ADMIN — Medication 10 ML: at 00:23

## 2019-04-27 RX ADMIN — SODIUM CHLORIDE 80 ML: 9 INJECTION, SOLUTION INTRAVENOUS at 00:01

## 2019-04-27 RX ADMIN — DEXTROSE MONOHYDRATE 100 ML/HR: 50 INJECTION, SOLUTION INTRAVENOUS at 06:21

## 2019-04-27 RX ADMIN — DEXTROSE MONOHYDRATE 100 ML/HR: 50 INJECTION, SOLUTION INTRAVENOUS at 09:32

## 2019-04-27 RX ADMIN — DEXTROSE MONOHYDRATE 12.5 G: 500 INJECTION PARENTERAL at 09:29

## 2019-04-27 ASSESSMENT — PAIN SCALES - GENERAL
PAINLEVEL_OUTOF10: 0

## 2019-04-27 NOTE — FLOWSHEET NOTE
· Facts: Writer responded to Stroke Alert in ED room #10. Writer had met patient and his wife Aly Gordon on previous hospitalizations. Wife talked about events leading up to coming to the hospital, patient's symptoms, and care she had offered him prior to the squad coming. · Feelings: She said she thought he had had a stroke. She appeared somewhat anxious as she engaged in steady conversation. After he had received intravenous medication, patient smiled and said he was feeling better. · Family: Wife is patient's caregiver and she seems to know much about his healthcare. They have been  48 years and have two adopted children and grandchildren that she spoke fondly of. · Bernadette: Wife talked about her bernadette in God and her prayer practice. She asked for prayer on patient's behalf. Follow-up:Spiritual care to follow-up. Both patient and wife expressed appreciation for visit and welcome continued support. 04/26/19 1840   Encounter Summary   Services provided to: Patient and family together  (Wife Aly Gordon present)   Referral/Consult From:   (Stroke Alert at 18:37)   Support System Spouse; Children   Continue Visiting   (4/26/19)   Complexity of Encounter High   Length of Encounter 1 hour;30 minutes   Spiritual Assessment Completed Yes   Crisis   Type Stroke Alert   Assessment Approachable; Anxious   Intervention Active listening;Explored feelings, thoughts, concerns;Explored coping resources;Prayer;Sustaining presence/ Ministry of presence; Discussed relationship with God;Discussed illness/injury and it's impact   Outcome Expressed gratitude;Engaged in conversation;Coping;Receptive

## 2019-04-27 NOTE — CONSULTS
flush  10 mL Intravenous 2 times per day    atorvastatin  40 mg Oral Nightly     Continuous Infusions:   dextrose 100 mL/hr (04/27/19 0932)     PRN Meds:.sodium chloride flush, magnesium hydroxide, ondansetron, glucose, dextrose, glucagon (rDNA), dextrose  Past Medical History   has a past medical history of Arthritis, Asthma, Cystitis, Diabetic neuropathy ([address] Diarrhea, Dizziness, GERD (gastroesophageal reflux disease), H/O acute bronchitis, [place] (hard of hearing), Hx-TIA (transient ischemic attack), Hypertension, Kyphosis of thoracolumbar region, Neurogenic claudication, Numbness and tingling, Onychomycosis, Prolonged emergence from general anesthesia, Spinal stenosis of lumbar region, Spondylolysis of lumbar region, Type II or unspecified type diabetes mellitus without mention of complication, not stated as uncontrolled, and Wears glasses. Past Surgical History   has a past surgical history that includes Tonsillectomy; Cystocopy; Colonoscopy; Total knee arthroplasty (Left); Inguinal hernia repair (Bilateral); Cataract removal with implant (Bilateral); Lumbar spine surgery; Revision total knee arthroplasty (Left); and Total hip arthroplasty (Right, 07/13/2016). Social History   reports that he has quit smoking. His smoking use included cigarettes. He quit smokeless tobacco use about 32 years ago. reports that he does not drink alcohol. reports that he does not use drugs. Family History  family history includes Coronary Art Dis in his father; Emphysema in his mother; Heart Attack in his father; High Blood Pressure in his father.     Review of Systems:  CONSTITUTIONAL:  negative for fevers, chills, fatigue and malaise    EYES:  negative for double vision, blurred vision and photophobia     HEENT:  negative for tinnitus, epistaxis and sore throat    RESPIRATORY:  negative for cough, shortness of breath, wheezing    CARDIOVASCULAR:  negative for chest pain, palpitations, syncope, edema    GASTROINTESTINAL: negative for nausea, vomiting    GENITOURINARY:  negative for incontinence    MUSCULOSKELETAL:  negative for neck or back pain    NEUROLOGICAL:  Negative for weakness and tingling  negative for headaches and dizziness    PSYCHIATRIC:  negative for anxiety      Review of systems otherwise negative. OBJECTIVE:   Vitals: BP (!) 169/71   Pulse 62   Temp 98 °F (36.7 °C) (Oral)   Resp 16   Ht 6' 3\" (1.905 m)   Wt 217 lb 11.2 oz (98.7 kg)   SpO2 97%   BMI 27.21 kg/m²     General appearance: Lying in bed, NAD,  Lungs: CTAB  Heart: RRR  Abdomen: soft, NTND, bowel sounds normal    Neuro exam: Follows simple commands. CN II-XII: no gross facial assymmetry, mild dysarthria, pupils 2 mm reactive to light bilaterally, EOMI, VFF. Juliocesar spontaneously against gravity, b/l hands tremors . LABS:     Recent Labs     04/24/19  1450 04/26/19  1830 04/26/19  1834 04/27/19  0623   WBC 10.0  --  8.3 7.3   HGB 12.9*  --  13.1* 12.3*   HCT 38.8*  --  38.3* 37.5*     --  286 259   NA  --   --  138 137   K  --   --  4.3 3.7   CL  --   --  104 106   CO2  --   --  22 20   BUN  --   --  23 18   CREATININE  --   --  1.38*  1.53* 1.02   CALCIUM  --   --  9.0 8.2*   INR  --   --  1.0  --    NITRU  --  NEGATIVE  --   --    COLORU  --  YELLOW  --   --    BACTERIA  --  NOT REPORTED  --   --      No results for input(s): ALKPHOS, ALT, AST, BILITOT, BILIDIR, LABALBU, AMYLASE, LIPASE in the last 72 hours. RADIOLOGY:   Images were personally reviewed including:  As per HPI      IMPRESSIONS:   80years old male with left sided weakness and dysarthria s/p tPA 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 MRI is pending  PLANS:   1. Load with  and Plavix 300 then tomorrow 81 mg and Plavix 75 mg daily  2. Start Lipitor 40 mg daily  3. Avoid symptomatic hypotension. 4. Obtain MRI brain   5.  Obtain TTE  6. Plan for GEM stent at 11 tomorrow      Shona Lino MD  Pager [phone]  Stroke, Neurocritical Care & [facility]  [address] [address] [place]  Electronically signed 4/27/2019 at 11:43 AM

## 2019-04-27 NOTE — FLOWSHEET NOTE
04/27/19 0324   Provider Notification   Reason for Communication Review case   Provider Name Dr. Vanita Pozo   Provider Notification Physician   Method of Communication Secure Message   Response Waiting for response   Notification Time [id number]     Notified that patient's last two BP's have been >695 systolic, manual /32. Awaiting response.

## 2019-04-27 NOTE — PROGRESS NOTES
Neuro critical care:     Acute stroke symptoms of speech difficulties and left hemiparesis status post IV TPA  Severe right ICA stenosis on CTA study   Hypoglycemic episodes    80year-old patient admitted to neuro ICU, transferred from [facility] ICU. Patient received IV TPA 4/26 for acute speech difficulties and left hemiparesis. He was admitted to [facility] ICU post TPA administration. CTA study showed more than 90% right ICA severe stenosis and patient was transferred to neuro ICU here for closer hemodynamic and neuro monitoring and possible intervention for severe ICA stenosis. Of note, after the onset of neurological symptoms patient started to have recurrent hypoglycemic episodes. As per wife, blood sugars were normal at the onset of symptoms. Close neuro and hemodynamic monitoring. Post TPA precautions. SBP less than 185  Frequent blood sugar checks, patient is on D10 infusion. He takes 60 units insulin glargine at home with history of diabetes. Hold insulin given recurrent hypoglycemic episodes. Family at bedside,  reports history of hypoglycemic episodes in past as well. On exam, patient is alert and follows all simple commands. Family at bedside, reports significant improvement in his symptoms- suspect mild slurred speech at present. No left-sided drift.    MRI brain study  Echocardiogram, stroke lab workup  Statins for secondary stroke prevention  Antiplatelets and DVT chemo prophylaxis- 24 hours post TPA  Swallow evaluation and diet accordingly  Therapy evaluations    Con Perez MD

## 2019-04-27 NOTE — H&P
Daily Progress Note  Neuro Critical Care    Patient Name: Marv Clay  Patient : 3/12/1931  Room/Bed: 0521/0521-01  Allergies: Allergies   Allergen Reactions    Bactrim [Sulfamethoxazole-Trimethoprim]      Unknown reaction    Other      baset    Sulfa Antibiotics Other (See Comments)     psoriasis    Sulfur      Causes pt to break out psorsias    Pcn [Penicillins] Hives     Also causes pt's throat to swell a little. Problem List:   Patient Active Problem List   Diagnosis    Lumbosacral spondylosis without myelopathy    Degeneration of lumbar or lumbosacral intervertebral disc    Spinal stenosis, lumbar region, with neurogenic claudication    Lumbar spondylolysis    Postural kyphosis of lumbar region    Arthritis of right hip    Right hip pain    Weight loss    Right lower quadrant abdominal pain    Anemia    Dizziness    Internal carotid artery stenosis, right    Received intravenous tissue plasminogen activator (t-PA) in emergency department    Ischemic stroke ([facility] [address]       CHIEF COMPLAINT:      AMS     INTERVAL HISTORY        Hospital Course:   81 y/o who came via mobile stroke unit to [address] [place] [address] due to acute onset of  AMS, dysarthria and left side hemiparesis at 4:30 pm with [facility]  Of 3:30 pm. Patient wife found him in living room mumbling words at 4:30 and he checked his BS it was 120 and called EMS who enroute checked BS and it was 37 at one time and he received  D50 x1 which improved his mental status but on arrival to [facility] he was drowsy and difficulty to arouse and appeared confussed however his left side weakness had resolved, he had Ct head done which showed ischemic changed and ctah/n showed 90% stenosis of L ICA. Patietn received t-PA.     LWKN : 3:30 pm  NIHSS: 4  A      He received t-PA at [facility] at 7 pm on  and si transferred here for further monitering             CURRENT MEDICATIONS:  SCHEDULED MEDICATIONS:   sodium chloride flush  10 mL Intravenous 2 times per day     CONTINUOUS INFUSIONS:   dextrose 100 mL/hr (19 0932)     PRN MEDICATIONS:   sodium chloride flush, magnesium hydroxide, ondansetron, glucose, dextrose, glucagon (rDNA), dextrose    VITALS:  Temperature Range: Temp: 98 °F (36.7 °C) Temp  Av.6 °F (36.4 °C)  Min: 97.2 °F (36.2 °C)  Max: 98 °F (36.7 °C)  BP Range: Systolic (92OCO), UNF:741 , Min:102 , IOB:021     Diastolic (93ITV), FWT:90, Min:38, Max:107    Pulse Range: Pulse  Av.2  Min: 52  Max: 73  Respiration Range: Resp  Av  Min: 13  Max: 26  Current Pulse Ox: SpO2: 97 %  24HR Pulse Ox Range: SpO2  Av.4 %  Min: 93 %  Max: 99 %  Patient Vitals for the past 12 hrs:   BP Temp Temp src Pulse Resp SpO2 Height Weight   19 0900 -- -- -- -- -- -- 6' 3\" (1.905 m) 217 lb 11.2 oz (98.7 kg)   19 0830 (!) 164/72 98 °F (36.7 °C) Oral 64 16 97 % -- --     Estimated body mass index is 27.21 kg/m² as calculated from the following:    Height as of this encounter: 6' 3\" (1.905 m).     Weight as of this encounter: 217 lb 11.2 oz (98.7 kg).  []<16 Severe malnutrition  []16-16.99 Moderate malnutrition  []17-18.49 Mild malnutrition  [x]18.5-24.9 Normal  []25-29.9 Overweight (not obese)  []30-34.9 Obese class 1 (Low Risk)  []35-39.9 Obese class 2 (Moderate Risk)  []?40 Obese class 3 (High Risk)    RECENT LABS:   Lab Results   Component Value Date    WBC 7.3 2019    HGB 12.3 (L) 2019    HCT 37.5 (L) 2019     2019    CHOL 139 2019    TRIG 104 2019    HDL 55 2019    ALT 13 2019    AST 21 2019     2019    K 3.7 2019     2019    CREATININE 1.02 2019    BUN 18 2019    CO2 20 2019    TSH 5.92 (H) 2019    PSA 0.04 2019    INR 1.0 2019    LABA1C 7.0 (H) 2018     24 HOUR INTAKE/OUTPUT:    Intake/Output Summary (Last 24 hours) at 2019 1102  Last data filed at 2019 0933  Gross per 24 hour Intake 10 ml   Output --   Net 10 ml       RADIOLOGY:   Xr Chest Standard (2 Vw)    Result Date: 4/18/2019  EXAMINATION: TWO VIEWS OF THE CHEST 4/18/2019 4:51 pm COMPARISON: Chest x-ray 08/04/2018 HISTORY: ORDERING SYSTEM PROVIDED HISTORY: Chronic obstructive pulmonary disease, unspecified COPD type ([facility] [address] TECHNOLOGIST PROVIDED HISTORY: Ordering Physician Provided Reason for Exam: Shortness of breath Acuity: Acute Type of Exam: Initial Relevant Medical/Surgical History: COPD FINDINGS: Chronic lung changes are present. No new parenchymal opacity. Costophrenic angles are clear. Cardiac and mediastinal structures are unchanged. The bones are unchanged. Chronic lung changes. No acute abnormality. Ct Head Wo Contrast    Result Date: 4/27/2019  EXAMINATION: CT OF THE HEAD WITHOUT CONTRAST  4/27/2019 6:41 am TECHNIQUE: CT of the head was performed without the administration of intravenous contrast. Dose modulation, iterative reconstruction, and/or weight based adjustment of the mA/kV was utilized to reduce the radiation dose to as low as reasonably achievable. COMPARISON: 04/26/2019 HISTORY: ORDERING SYSTEM PROVIDED HISTORY: 12 hours s/p tPA administration TECHNOLOGIST PROVIDED HISTORY: Ordering Physician Provided Reason for Exam: 12 hours s/p tPA administration Acuity: Acute Type of Exam: Subsequent/Follow-up FINDINGS: BRAIN/VENTRICLES: There is prominence of the ventricles and cortical sulci consistent with cortical volume loss. No midline shift. Basal cisterns are normally outlined. There is no clear evidence for acute infarct. No acute intra or extra-axial hemorrhage. Internal carotid artery calcifications noted. ORBITS: The visualized portion of the orbits demonstrate no acute abnormality. SINUSES: The visualized paranasal sinuses and mastoid air cells demonstrate no acute abnormality. SOFT TISSUES/SKULL:  No acute abnormality of the visualized skull or soft tissues.      1. No acute intracranial hemorrhage. 2. Cerebral atrophy and chronic small vessel ischemic changes in the white matter. Ct Head Wo Contrast    Result Date: 4/26/2019  EXAMINATION: CT OF THE HEAD WITHOUT CONTRAST  4/26/2019 5:25 pm TECHNIQUE: CT of the head was performed without the administration of intravenous contrast. Dose modulation, iterative reconstruction, and/or weight based adjustment of the mA/kV was utilized to reduce the radiation dose to as low as reasonably achievable. COMPARISON: August 4, 2018 HISTORY: ORDERING SYSTEM PROVIDED HISTORY: STROKE TECHNOLOGIST PROVIDED HISTORY: FINDINGS: BRAIN/VENTRICLES: Ventricles sulci are stable. Artifact limits the posterior fossa. There is no mass effect on the 4th ventricle. Small calcifications in the posterior fossa are again noted. Multifocal low-density in the periventricular and subcortical white matter is noted bilaterally. Findings are slightly greater in the right frontal region extending posteriorly to the frontal parietal junction. There may be subtle extension to the right frontal cortex. No hyperdense arteries are noted. Vascular calcifications are noted. There is no hemorrhage ORBITS: The visualized portion of the orbits demonstrate no acute abnormality. SINUSES: The visualized paranasal sinuses and mastoid air cells demonstrate no acute abnormality. SOFT TISSUES/SKULL:  No acute abnormality of the visualized skull or soft tissues. Limitation due to artifact Multifocal small-vessel ischemic change bilaterally. Focal asymmetric low density in the right frontal subcortical white matter with questionable extension of the cortex. This was present on the prior. Subtle increase would be difficult to exclude. If stroke symptoms continue consider MRI. Critical results were called by Dr. Desiree Alvarenga to Shiva Monroy on 4/26/2019 at 17:41.      Xr Chest Portable    Result Date: 4/27/2019  EXAMINATION: SINGLE XRAY VIEW OF THE CHEST 4/27/2019 9:40 am COMPARISON: 04/18/2019 HISTORY: ORDERING SYSTEM PROVIDED HISTORY: eval lungs TECHNOLOGIST PROVIDED HISTORY: eval lungs FINDINGS: The heart and mediastinal structures are stable. Increased interstitial markings are present compatible with chronic interstitial lung disease no new pulmonary process is seen. The appearance of lungs are similar to the study from 07/06/2018. Stable chest chronic interstitial lung disease. Cta Neck W Contrast    Result Date: 4/27/2019  EXAMINATION: CTA OF THE NECK; CTA OF THE HEAD WITH CONTRAST 4/27/2019 12:07 am: TECHNIQUE: CTA of the neck was performed with the administration of intravenous contrast. Multiplanar reformatted images are provided for review. MIP images are provided for review. Stenosis of the internal carotid arteries measured using NASCET criteria. Dose modulation, iterative reconstruction, and/or weight based adjustment of the mA/kV was utilized to reduce the radiation dose to as low as reasonably achievable.; CTA of the head/brain was performed with the administration of intravenous contrast. Multiplanar reformatted images are provided for review. MIP images are provided for review. Dose modulation, iterative reconstruction, and/or weight based adjustment of the mA/kV was utilized to reduce the radiation dose to as low as reasonably achievable. 3D reconstructed images were performed on a separate workstation and provided for review. COMPARISON: CT neck 07/08/2008 CT head 04/26/2019 HISTORY: ORDERING SYSTEM PROVIDED HISTORY: stroke like symptoms, NIH 4 TECHNOLOGIST PROVIDED HISTORY: Ordering Physician Provided Reason for Exam: stroke-like symptoms Acuity: Acute Type of Exam: Unknown FINDINGS: CTA NECK: AORTIC ARCH/ARCH VESSELS: There is a normal branch pattern of the aortic arch. No significant stenosis is seen of the innominate artery or subclavian arteries.  CAROTID ARTERIES: The common carotid arteries demonstrate mild atherosclerotic changes without evidence of a flow limiting stenosis. Heavy atherosclerotic plaque identified right carotid bifurcation extending into the right proximal ICA resulting in subtotal, greater than 90% stenosis right proximal ICA per NASCET criteria. .  The left internal carotid artery demonstrates mild plaque left carotid bifurcation. Normal in appearance without evidence of a flow limiting stenosis by NASCET criteria. No dissection or arterial injury is seen. VERTEBRAL ARTERIES: The vertebral arteries both arise from the subclavian arteries and are normal in caliber without evidence of flow limiting stenosis or dissection. SOFT TISSUES: The lung apices are clear. No cervical or superior mediastinal lymphadenopathy. The visualized portion of the larynx and pharynx appear unremarkable. The parotid, submandibular and thyroid glands demonstrate no acute abnormality. BONES: The visualized osseous structures demonstrate multilevel degenerative changes. There subpleural areas of interstitial thickening suggestive underlying airspace disease involving both lung apices. . CTA HEAD: ANTERIOR CIRCULATION: Moderate plaque identified involving the cavernous and supraclinoid ICAs resulting in up to 40-50% stenosis involving the proximal cavernous segments. The anterior cerebral and middle cerebral arteries demonstrate no focal stenosis. POSTERIOR CIRCULATION: The posterior cerebral arteries demonstrate no focal stenosis. The vertebral and basilar arteries appear unremarkable. BRAIN: No mass effect or midline shift. No abnormal extra-axial fluid collection. The gray-white differentiation appears grossly maintained. 90% or greater subtotal stenosis identified right proximal ICA level carotid bifurcation.  No hemodynamic stenosis or occlusion identified involving intracranial arterial circulation     Cta Head W Contrast    Result Date: 4/27/2019  EXAMINATION: CTA OF THE NECK; CTA OF THE HEAD WITH CONTRAST 4/27/2019 12:07 am: TECHNIQUE: CTA of the neck was performed with the administration of intravenous contrast. Multiplanar reformatted images are provided for review. MIP images are provided for review. Stenosis of the internal carotid arteries measured using NASCET criteria. Dose modulation, iterative reconstruction, and/or weight based adjustment of the mA/kV was utilized to reduce the radiation dose to as low as reasonably achievable.; CTA of the head/brain was performed with the administration of intravenous contrast. Multiplanar reformatted images are provided for review. MIP images are provided for review. Dose modulation, iterative reconstruction, and/or weight based adjustment of the mA/kV was utilized to reduce the radiation dose to as low as reasonably achievable. 3D reconstructed images were performed on a separate workstation and provided for review. COMPARISON: CT neck 07/08/2008 CT head 04/26/2019 HISTORY: ORDERING SYSTEM PROVIDED HISTORY: stroke like symptoms, NIH 4 TECHNOLOGIST PROVIDED HISTORY: Ordering Physician Provided Reason for Exam: stroke-like symptoms Acuity: Acute Type of Exam: Unknown FINDINGS: CTA NECK: AORTIC ARCH/ARCH VESSELS: There is a normal branch pattern of the aortic arch. No significant stenosis is seen of the innominate artery or subclavian arteries. CAROTID ARTERIES: The common carotid arteries demonstrate mild atherosclerotic changes without evidence of a flow limiting stenosis. Heavy atherosclerotic plaque identified right carotid bifurcation extending into the right proximal ICA resulting in subtotal, greater than 90% stenosis right proximal ICA per NASCET criteria. .  The left internal carotid artery demonstrates mild plaque left carotid bifurcation. Normal in appearance without evidence of a flow limiting stenosis by NASCET criteria. No dissection or arterial injury is seen.  VERTEBRAL ARTERIES: The vertebral arteries both arise from the subclavian arteries and are normal in caliber without evidence of flow limiting regarding further intervention  - keep post t-Pa precaution for 24 hours  - f/u Ct head at 7 pm  - f/u MRI  - Goal SBP <180  -labetalol, hydralazine and Cardene prn  - no antiplatelets/ anti coags till 24 hr post- t-PA  - Neuro checks per protocol    CARDIOVASCULAR:  - Goal SBP <180  - statin 40 mg  - Continue telemetry  -labetalol, hydralazine and Cardene prn  - f/u echo    PULMONARY:  -Monitor saturation    RENAL/FLUID/ELECTROLYTE:  - BUN 18/ Creatinine 1.02  - IVF@ 75 cc/hr  - Replace electrolytes PRN  - Daily BMP    GI/NUTRITION:  NUTRITION:  - advance diet as toelrated  - Bowel regimen: MoM  - GI prophylaxis: Pepcid    ID:  -afebrile  - Continue to monitor for fevers  - Daily CBC    HEME:   - H&H 12.3/37.5  - Platelets 005  - Daily CBC    ENDOCRINE:  - Continue to monitor blood glucose, goal <180  - hypoglycemia protocol    OTHER:  - PT/OT/ST   - Code Status: full        DVT PROPHYLAXIS:  - SCD sleeves - Thigh High   - EMMA stockings - Thigh High  -no anticoag till 24 hr post t-PA    DISPOSITION:  [x] To remain ICU:  [] OK for out of ICU from Neuro Critical Care standpoint    We will continue to follow along. For any changes in exam or patient status please contact Neuro Critical Care.       Aneesh Al MD  Neuro Critical Care  Pager [phone]  4/27/2019     11:02 AM

## 2019-04-27 NOTE — PROGRESS NOTES
Pt alert and oriented. NIH stroke scale assessed at bedside via writer and Linda Brantley RN during handoff report. Patient transferred to [place] [id number] from ED on monitor via RN and EMT.

## 2019-04-27 NOTE — PROGRESS NOTES
Writer attempted to reach patient's family. Cell phone number for son Ortega Engle is not accurate. No answer at wife's home and unable to leave message on son's home voicemail. Message left on wife's voicemail to return call.

## 2019-04-27 NOTE — FLOWSHEET NOTE
04/27/19 0610   Provider Notification   Reason for Communication Review case;New orders   Provider Name Dr. Judy Bustamante   Provider Notification Physician   Method of Communication Call   Response See orders   Notification Time [phone] spoke with Dr. Judy Bustamante. OK to start D5 via hypoglycemic protocol. Do not re-order Lantus. Notified of transfer to [place]. V's. Daily CBC and BMP ordered.

## 2019-04-27 NOTE — PLAN OF CARE
Problem: Falls - Risk of:  Goal: Will remain free from falls  Description  Will remain free from falls  Outcome: Ongoing  Note:   The patient remained free from falls this shift, call light within reach, bed in locked and lowest position. Side rails up x2. Continue to monitor closely.       Problem: HEMODYNAMIC STATUS  Goal: Patient has stable vital signs and fluid balance  Outcome: Ongoing     Problem: ACTIVITY INTOLERANCE/IMPAIRED MOBILITY  Goal: Mobility/activity is maintained at optimum level for patient  Outcome: Ongoing

## 2019-04-27 NOTE — PROGRESS NOTES
Access center phoned writer to say they are still working on a bed for patient at [facility]. V's.

## 2019-04-27 NOTE — PROCEDURES
PJ ELLIS, PPatient Assessment complete. Ischemic stroke ([facility] [address] [I63.9] . Vitals:    04/27/19 1603   BP: (!) 156/59   Pulse: 73   Resp: 18   Temp: 98.1 °F (36.7 °C)   SpO2: 97%   . Patients home meds are   Prior to Admission medications    Medication Sig Start Date End Date Taking? Authorizing Provider   clopidogrel (PLAVIX) 75 MG tablet  4/21/19   Historical Provider, MD   fluticasone (FLONASE) 50 MCG/ACT nasal spray 2 sprays by Nasal route daily 2 SPRAYS IN EACH NOSTRIL. 8/7/18 9/6/18  Adrien Walker DO   montelukast (SINGULAIR) 10 MG tablet  8/15/17   Historical Provider, MD   metoprolol tartrate (LOPRESSOR) 50 MG tablet Take 50 mg by mouth daily  5/6/17   Historical Provider, MD   omeprazole (PRILOSEC) 20 MG delayed release capsule Take 20 mg by mouth Daily Indications: ([facility] Mail order)     Historical Provider, MD   budesonide-formoterol (SYMBICORT) 160-4.5 MCG/ACT AERO Inhale 1 puff into the lungs 2 times daily Indications: ([facility] Mail order)     Historical Provider, MD   LANMARYUS SOLOSTAR 100 UNIT/ML injection pen Inject 60 Units into the skin daily  Patient taking differently: Inject 55 Units into the skin daily  7/16/16   Rickey Velásquez MD   BD ULTRA-FINE PEN NEEDLES 29G X 12.7MM MISC Inject 1 each into the skin daily  4/7/16   Historical Provider, MD   loperamide (IMODIUM) 2 MG capsule Take 2 mg by mouth 4 times daily as needed.       Historical Provider, MD   .  Recent Surgical History: None = 0     Assessment     Peak Flow (asthma only)    Predicted:   Personal Best:   PEF   % Predicted   Peak Flow : not applicable = 0    KCM2/EIX    FEV1 Predicted       FEV1     FEV1 % Predicted   FVC   IS volume   IBW     RR 18  Breath Sounds: clear      · Bronchodilator assessment at level  1  · Hyperinflation assessment at level   · Secretion Management assessment at level    ·   · [x]    Bronchodilator Assessment  BRONCHODILATOR ASSESSMENT SCORE  Score 0 1 2 3 4 5   Breath Sounds []  Patient Baseline [x]  No Wheeze good aeration []  Faint, scattered wheezing, good aeration []  Expiratory Wheezing and or moderately diminished []  Insp/Exp wheeze and/or very diminished []  Insp/Exp and/ or marked distress   Respiratory Rate   []  Patient Baseline [x]  Less than 20 []  Less than 20 []  20-25 []  Greater than 25 []  Greater than 25   Peak flow % of Pred or PB [x]  NA   []  Greater than 90%  []  81-90% []  71-80% []  Less than or equal to 70%  or unable to perform []  Unable due to Respiratory Distress   Dyspnea re []  Patient Baseline [x]  No SOB []  No SOB []  SOB on exertion []  SOB min activity []  At rest/acute   e FEV% Predicted       [x]  NA []  Above 69%  []  Unable []  Above 60-69%  []  Unable []  Above 50-59%  []  Unable []  Above 35-49%  []  Unable []  Less than 35%  []  Unable                 []  Hyperinflation Assessment  Score 1 2 3   CXR and Breath Sounds   []  Clear []  No atelectasis  Basilar aeration []  Atelectasis or absent basilar breath sounds   Incentive Spirometry Volume  (Per IBW)   []  Greater than or equal to 15ml/Kg []  less than 15ml/Kg []  less than 15ml/Kg   Surgery within last 2 weeks []  None or general   []  Abdominal or thoracic surgery  []  Abdominal or thoracic   Chronic Pulmonary Historyre []  No []  Yes []  Yes     []  Secretion Management Assessment  Score 1 2 3   Bilateral Breath Sounds   []  Occasional Rhonchi []  Scattered Rhonchi []  Course Rhonchi and/or poor aeration   Sputum    []  Small amount of thin secretions []  Moderate amount of viscous secretions []  Copius, Viscious Yellow/ Secretions   CXR as reported by physician []  clear  []  Unavailable []  Infiltrates and/or consolidation  []  Unavailable []  Mucus Plugging and or lobar consolidation  []  Unavailable   Cough []  Strong, productive cough []  Weak productive cough []  No cough or weak non-productive cough   PJ ELLIS  5:27 PM                            FEMALE MALE                            FEV1 Predicted Normal Values                        FEV1 Predicted Normal Values          Age                                     Height in Feet and Inches       Age                                     Height in Feet and Inches       4' 11\" 5' 1\" 5' 3\" 5' 5\" 5' 7\" 5' 9\" 5' 11\" 6' 1\"  4' 11\" 5' 1\" 5' 3\" 5' 5\" 5' 7\" 5' 9\" 5' 11\" 6' 1\"   43 - 45 2.49 2.66 2.84 3.03 3.22 3.42 3.62 3.83 42 - 45 2.82 3.03 3.26 3.49 3.72 3.96 4.22 4.47   46 - 49 2.40 2.57 2.76 2.94 3.14 3.33 3.54 3.75 46 - 49 2.70 2.92 3.14 3.37 3.61 3.85 4.10 4.36   50 - 53 2.31 2.48 2.66 2.85 3.04 3.24 3.45 3.66 50 - 53 2.58 2.80 3.02 3.25 3.49 3.73 3.98 4.24   54 - 57 2.21 2.38 2.57 2.75 2.95 3.14 3.35 3.56 54 - 57 2.46 2.67 2.89 3.12 3.36 3.60 3.85 4.11   58 - 61 2.10 2.28 2.46 2.65 2.84 3.04 3.24 3.45 58 - 61 2.32 2.54 2.76 2.99 3.23 3.47 3.72 3.98   62 - 65 1.99 2.17 2.35 2.54 2.73 2.93 3.13 3.34 62 - 65 2.19 2.40 2.62 2.85 3.09 3.33 3.58 3.84   66 - 69 1.88 2.05 2.23 2.42 2.61 2.81 3.02 3.23 66 - 69 2.04 2.26 2.48 2.71 2.95 3.19 3.44 3.70   70+ 1.82 1.99 2.17 2.36 2.55 2.75 2.95 3.16 70+ 1.97 2.19 2.41 2.64 2.87 3.12 3.37 3.62             Predicted Peak Expiratory Flow Rate                                       Height (in)  Female       Height (in) Male           Age 05 64 87 59 52 08 10 79 Age            80 833 230 820 179 674 598 438 682  58 74 89 47 78 70 72 74 76   25 337 352 366 381 396 411 426 441 25 447 476 505 533 562 591 619 648 677   30 329 344 359 374 389 404 419 434 30 437 466 494 523 552 580 609 638 667   35 322 337 351 366 381 396 411 426 35 426 455 484 512 541 570 598 627 657   40 314 329 344 359 374 389 404 419 40 416 445 473 502 531 559 588 617 647   45 307 322 336 351 366 381 396 411 45 405 434 463 491 520 549 577 606 636   50 299 314 329 344 359 374 389 404 50 395 424 452 481 510 538 567 596 625   55 292 307 321 336 351 366 381 396 55 384 413 442 470 499 528 556 585 615   60 284 299 314 329 344 021 525 11 89   65 277 292 306 321 336 351 366 381 65 363 392 421 449 478 507 535 564 594   70 269 284 299 314 329 344 359 374 70 353 382 410 439 468 496 525 554 583   75 261 274 289 305 319 334 348 364 75 344 372 400 429 458 487 515 544 573   80 253 266 282 296 312 327 342 356 80 335 362 390 419 448 476 [phone]

## 2019-04-27 NOTE — ED NOTES
Pt states he is able to remember that he had difficulty speaking earlier today.       Rebecca Tomlin, ZE  04/26/19 2050

## 2019-04-28 LAB
ANION GAP SERPL CALCULATED.3IONS-SCNC: 11 MMOL/L (ref 9–17)
BUN BLDV-MCNC: 12 MG/DL (ref 8–23)
BUN/CREAT BLD: ABNORMAL (ref 9–20)
CALCIUM SERPL-MCNC: 8.7 MG/DL (ref 8.6–10.4)
CHLORIDE BLD-SCNC: 104 MMOL/L (ref 98–107)
CO2: 21 MMOL/L (ref 20–31)
CREAT SERPL-MCNC: 1.16 MG/DL (ref 0.7–1.2)
ESTIMATED AVERAGE GLUCOSE: 137 MG/DL
GFR AFRICAN AMERICAN: >60 ML/MIN
GFR NON-AFRICAN AMERICAN: 59 ML/MIN
GFR SERPL CREATININE-BSD FRML MDRD: ABNORMAL ML/MIN/{1.73_M2}
GFR SERPL CREATININE-BSD FRML MDRD: ABNORMAL ML/MIN/{1.73_M2}
GLUCOSE BLD-MCNC: 122 MG/DL (ref 75–110)
GLUCOSE BLD-MCNC: 128 MG/DL (ref 75–110)
GLUCOSE BLD-MCNC: 137 MG/DL (ref 75–110)
GLUCOSE BLD-MCNC: 146 MG/DL (ref 70–99)
GLUCOSE BLD-MCNC: 154 MG/DL (ref 75–110)
GLUCOSE BLD-MCNC: 177 MG/DL (ref 75–110)
GLUCOSE BLD-MCNC: 179 MG/DL (ref 75–110)
GLUCOSE BLD-MCNC: 68 MG/DL (ref 75–110)
GLUCOSE BLD-MCNC: 69 MG/DL (ref 75–110)
GLUCOSE BLD-MCNC: 84 MG/DL (ref 75–110)
HBA1C MFR BLD: 6.4 % (ref 4–6)
HCT VFR BLD CALC: 39.8 % (ref 40.7–50.3)
HEMOGLOBIN: 12.8 G/DL (ref 13–17)
MCH RBC QN AUTO: 29.4 PG (ref 25.2–33.5)
MCHC RBC AUTO-ENTMCNC: 32.2 G/DL (ref 28.4–34.8)
MCV RBC AUTO: 91.3 FL (ref 82.6–102.9)
NRBC AUTOMATED: 0 PER 100 WBC
PDW BLD-RTO: 14.5 % (ref 11.8–14.4)
PLATELET # BLD: 266 K/UL (ref 138–453)
PMV BLD AUTO: 9.6 FL (ref 8.1–13.5)
POTASSIUM SERPL-SCNC: 4.4 MMOL/L (ref 3.7–5.3)
RBC # BLD: 4.36 M/UL (ref 4.21–5.77)
SODIUM BLD-SCNC: 136 MMOL/L (ref 135–144)
WBC # BLD: 10.1 K/UL (ref 3.5–11.3)

## 2019-04-28 PROCEDURE — 6370000000 HC RX 637 (ALT 250 FOR IP): Performed by: STUDENT IN AN ORGANIZED HEALTH CARE EDUCATION/TRAINING PROGRAM

## 2019-04-28 PROCEDURE — 6360000002 HC RX W HCPCS

## 2019-04-28 PROCEDURE — 2580000003 HC RX 258: Performed by: STUDENT IN AN ORGANIZED HEALTH CARE EDUCATION/TRAINING PROGRAM

## 2019-04-28 PROCEDURE — 037K3DZ DILATION OF RIGHT INTERNAL CAROTID ARTERY WITH INTRALUMINAL DEVICE, PERCUTANEOUS APPROACH: ICD-10-PCS | Performed by: PSYCHIATRY & NEUROLOGY

## 2019-04-28 PROCEDURE — B3131ZZ FLUOROSCOPY OF RIGHT COMMON CAROTID ARTERY USING LOW OSMOLAR CONTRAST: ICD-10-PCS | Performed by: PSYCHIATRY & NEUROLOGY

## 2019-04-28 PROCEDURE — 2060000000 HC ICU INTERMEDIATE R&B

## 2019-04-28 PROCEDURE — 6360000002 HC RX W HCPCS: Performed by: STUDENT IN AN ORGANIZED HEALTH CARE EDUCATION/TRAINING PROGRAM

## 2019-04-28 PROCEDURE — 99233 SBSQ HOSP IP/OBS HIGH 50: CPT | Performed by: PSYCHIATRY & NEUROLOGY

## 2019-04-28 PROCEDURE — 99223 1ST HOSP IP/OBS HIGH 75: CPT | Performed by: INTERNAL MEDICINE

## 2019-04-28 PROCEDURE — 36415 COLL VENOUS BLD VENIPUNCTURE: CPT

## 2019-04-28 PROCEDURE — 85027 COMPLETE CBC AUTOMATED: CPT

## 2019-04-28 PROCEDURE — B41F1ZZ FLUOROSCOPY OF RIGHT LOWER EXTREMITY ARTERIES USING LOW OSMOLAR CONTRAST: ICD-10-PCS | Performed by: PSYCHIATRY & NEUROLOGY

## 2019-04-28 PROCEDURE — B3161ZZ FLUOROSCOPY OF RIGHT INTERNAL CAROTID ARTERY USING LOW OSMOLAR CONTRAST: ICD-10-PCS | Performed by: PSYCHIATRY & NEUROLOGY

## 2019-04-28 PROCEDURE — 80048 BASIC METABOLIC PNL TOTAL CA: CPT

## 2019-04-28 PROCEDURE — 6370000000 HC RX 637 (ALT 250 FOR IP): Performed by: PSYCHIATRY & NEUROLOGY

## 2019-04-28 RX ORDER — DEXTROSE MONOHYDRATE 50 MG/ML
INJECTION, SOLUTION INTRAVENOUS CONTINUOUS
Status: DISCONTINUED | OUTPATIENT
Start: 2019-04-28 | End: 2019-04-29

## 2019-04-28 RX ORDER — HYDRALAZINE HYDROCHLORIDE 20 MG/ML
INJECTION INTRAMUSCULAR; INTRAVENOUS
Status: DISPENSED
Start: 2019-04-28 | End: 2019-04-28

## 2019-04-28 RX ORDER — FENTANYL CITRATE 50 UG/ML
50 INJECTION, SOLUTION INTRAMUSCULAR; INTRAVENOUS ONCE
Status: COMPLETED | OUTPATIENT
Start: 2019-04-28 | End: 2019-04-28

## 2019-04-28 RX ORDER — HALOPERIDOL 5 MG/ML
2 INJECTION INTRAMUSCULAR ONCE
Status: COMPLETED | OUTPATIENT
Start: 2019-04-28 | End: 2019-04-28

## 2019-04-28 RX ORDER — FENTANYL CITRATE 50 UG/ML
INJECTION, SOLUTION INTRAMUSCULAR; INTRAVENOUS
Status: COMPLETED
Start: 2019-04-28 | End: 2019-04-28

## 2019-04-28 RX ORDER — QUETIAPINE FUMARATE 25 MG/1
25 TABLET, FILM COATED ORAL NIGHTLY
Status: DISCONTINUED | OUTPATIENT
Start: 2019-04-28 | End: 2019-05-01 | Stop reason: HOSPADM

## 2019-04-28 RX ORDER — HYDRALAZINE HYDROCHLORIDE 20 MG/ML
10 INJECTION INTRAMUSCULAR; INTRAVENOUS EVERY 4 HOURS PRN
Status: DISCONTINUED | OUTPATIENT
Start: 2019-04-28 | End: 2019-05-01 | Stop reason: HOSPADM

## 2019-04-28 RX ORDER — QUETIAPINE FUMARATE 25 MG/1
25 TABLET, FILM COATED ORAL 2 TIMES DAILY
Status: DISCONTINUED | OUTPATIENT
Start: 2019-04-28 | End: 2019-04-28 | Stop reason: DRUGHIGH

## 2019-04-28 RX ORDER — ZIPRASIDONE MESYLATE 20 MG/ML
5 INJECTION, POWDER, LYOPHILIZED, FOR SOLUTION INTRAMUSCULAR EVERY 6 HOURS PRN
Status: DISCONTINUED | OUTPATIENT
Start: 2019-04-28 | End: 2019-05-01 | Stop reason: HOSPADM

## 2019-04-28 RX ADMIN — DESMOPRESSIN ACETATE 40 MG: 0.2 TABLET ORAL at 21:15

## 2019-04-28 RX ADMIN — DEXTROSE MONOHYDRATE: 50 INJECTION, SOLUTION INTRAVENOUS at 13:13

## 2019-04-28 RX ADMIN — QUETIAPINE FUMARATE 25 MG: 25 TABLET ORAL at 21:15

## 2019-04-28 RX ADMIN — DEXTROSE MONOHYDRATE: 50 INJECTION, SOLUTION INTRAVENOUS at 00:15

## 2019-04-28 RX ADMIN — FENTANYL CITRATE 50 MCG: 50 INJECTION, SOLUTION INTRAMUSCULAR; INTRAVENOUS at 03:01

## 2019-04-28 RX ADMIN — HALOPERIDOL LACTATE 2 MG: 5 INJECTION INTRAMUSCULAR at 00:59

## 2019-04-28 RX ADMIN — Medication 10 ML: at 21:16

## 2019-04-28 RX ADMIN — HYDRALAZINE HYDROCHLORIDE 10 MG: 20 INJECTION INTRAMUSCULAR; INTRAVENOUS at 00:10

## 2019-04-28 RX ADMIN — FENTANYL CITRATE 50 MCG: 50 INJECTION, SOLUTION INTRAMUSCULAR; INTRAVENOUS at 04:22

## 2019-04-28 ASSESSMENT — PAIN SCALES - GENERAL
PAINLEVEL_OUTOF10: 0

## 2019-04-28 NOTE — PROGRESS NOTES
ENDOVASCULAR NEUROSURGERY PROGRESS NOTE  4/28/2019 11:38 AM  Subjective:   Admit Date: 4/27/2019  PCP: Rell Spring DO    Patient was confused this am and refused to take his oral medication. BG overnight 60-80s. Objective:   Vitals: BP (!) 165/63   Pulse 99   Temp 98.1 °F (36.7 °C) (Oral)   Resp 19   Ht 6' 3\" (1.905 m)   Wt 217 lb 11.2 oz (98.7 kg)   SpO2 98%   BMI 27.21 kg/m²     General appearance: Lying in bed, NAD,  Lungs: CTAB  Heart: RRR  Abdomen: soft, NTND, bowel sounds normal    Neuro exam: Follows simple commands, disoriented. CN II-XII: no gross facial asymmetry, dysarthria, pupils 2 mm reactive to light bilaterally, EOMI, VFF. Juliocesar spontaneously against gravity. Tremors b/l        Medications and labs:   Scheduled Meds:   hydrALAZINE        QUEtiapine  25 mg Oral Nightly    sodium chloride flush  10 mL Intravenous 2 times per day    atorvastatin  40 mg Oral Nightly    aspirin  325 mg Oral Once    clopidogrel  300 mg Oral Once    aspirin  81 mg Oral Daily    clopidogrel  75 mg Oral Daily     Continuous Infusions:   dextrose 75 mL/hr at 04/28/19 0015    dextrose Stopped (04/27/19 1510)     CBC:   Recent Labs     04/27/19  0623 04/27/19  1140 04/28/19  0856   WBC 7.3 9.7 10.1   HGB 12.3* 12.4* 12.8*    259 266     BMP:    Recent Labs     04/27/19  0623 04/27/19  1140 04/28/19  0856    140 136   K 3.7 4.3 4.4    108* 104   CO2 20 23 21   BUN 18 14 12   CREATININE 1.02 1.09 1.16   GLUCOSE 153* 83 146*       Assessment and Recommendations:   80years old male with left sided weakness and dysarthria s/p tPA 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 MRI is neg for acute stroke. 1. Continue ASA 81 mg and Plavix 75 mg daily - s/p load  2. Continue lipitor 40 mg daily  3. TTE   4. Avoid symptomatic hypotension  5. Internal medicine consult for hypoglycemia clearance for right ICA stent  6. Can be moved to the floor  7.  Plan for GEM stent during

## 2019-04-28 NOTE — PROGRESS NOTES
FSB 68. Orange juice, peanut butter, and crackers given to pt. FSBS recheck 69. Notified Dr. Fabio Trujillo, orders received. FSBS 84.

## 2019-04-28 NOTE — CARE COORDINATION
Case Management Initial Discharge Plan  [address],             Met with:spouse/SO to discuss discharge plans. Information verified: address, contacts, phone number, , insurance Yes  PCP: Iwona Grover DO  Date of last visit: 1-2 weeks ago    Insurance Provider: Medicare - primary, [employer]- secondary    Discharge Planning    Living Arrangements:  Spouse/Significant Other   Support Systems:  Spouse/Significant Other, Children, Family Members    Home has 1 story   Pt uses ramp to get into home    Patient able to perform ADL's:Assisted    Current Services (outpatient & in home) [facility]  DME equipment: [employer], 4 wheeled walker, glucometer  DME provider: n/a    Pharmacy: [facility] on [address] [facility] Medications:  No  Does patient want to participate in local refill/ meds to beds program?  No    Potential Assistance Needed:  N/A    Patient agreeable to home care: Yes  Freedom of choice provided:  Yes, currently using [facility]    Prior SNF/Rehab Placement and Facility: [facility] to SNF/Rehab: Yes  [place] of choice provided: yes, will provide list if needed   Evaluation: n/a    Expected Discharge date:  19  Patient expects to be discharged to:  SNF  Follow Up Appointment: Best Day/ Time: Tuesday PM    Transportation provider: family or transport  Transportation arrangements needed for discharge: may need transportation depending on pt placement when discharged    Readmission Risk              Risk of Unplanned Readmission:        18             Does patient have a readmission risk score greater than 14?: Yes  If yes, follow-up appointment must be made within 7 days of discharge. Discharge Plan: POC TBD,   Needs F/U appt.    Current with [facility]  Previously at [facility], family choice for SNF          Electronically signed by Benson Nettles RN on 19 at 11:28 AM

## 2019-04-28 NOTE — CONSULTS
obtained because of patient's mentation. PHYSICAL EXAM      BP (!) 174/88   Pulse 94   Temp 98.1 °F (36.7 °C) (Oral)   Resp 17   Ht 6' 3\" (1.905 m)   Wt 217 lb 11.2 oz (98.7 kg)   SpO2 98%   BMI 27.21 kg/m²      · General appearance: well nourished  · HEENT: Head: Normocephalic, no lesions, without obvious abnormality. · Lungs: clear to auscultation bilaterally  · Heart: regular rate and rhythm, S1, S2 normal, no murmur, click, rub or gallop  · Abdomen: soft, non-tender; bowel sounds normal; no masses,  no organomegaly  · Extremities: extremities normal, atraumatic, no cyanosis or edema  · Neurological: Gait normal. Reflexes normal and symmetric. Sensation grossly normal  · Skin - no rash, no lump   · Eye no icterus no redness  · Psych-normal affect   · NEURO-no limb weakness  No facial droop  · Lymphatic system-no lymphadenopathy no splenomegaly     DIAGNOSTICS      Laboratory Testing:  CBC:   Recent Labs     04/28/19  0856   WBC 10.1   HGB 12.8*        BMP:    Recent Labs     04/27/19  0623 04/27/19  1140 04/28/19  0856    140 136   K 3.7 4.3 4.4    108* 104   CO2 20 23 21   BUN 18 14 12   CREATININE 1.02 1.09 1.16   GLUCOSE 153* 83 146*     S. Calcium:  Recent Labs     04/28/19  0856   CALCIUM 8.7     S. Ionized Calcium:No results for input(s): IONCA in the last 72 hours. S. Magnesium:No results for input(s): MG in the last 72 hours. S. Phosphorus:No results for input(s): PHOS in the last 72 hours. S. Glucose:  Recent Labs     04/28/19  0614 04/28/19  0750 04/28/19  1053   POCGLU 128* 122* 154*     Glycosylated hemoglobin A1C:   Recent Labs     04/27/19  1140   LABA1C 6.4*     INR:   Recent Labs     04/26/19  1834   INR 1.0     Hepatic functions: No results for input(s): ALKPHOS, ALT, AST, PROT, BILITOT, BILIDIR, LABALBU in the last 72 hours. Pancreatic functions:  Recent Labs     04/27/19  1140   LACTA NOT REPORTED     S.  Lactic Acid:   Recent Labs     04/27/19  1140   LACTA NOT REPORTED     Cardiac enzymes:  Recent Labs     04/26/19  1834   CKTOTAL 45   CKMB 1.6     BNP:No results for input(s): BNP in the last 72 hours. Lipid profile: No results for input(s): CHOL, TRIG, HDL, LDLCALC in the last 72 hours. Invalid input(s): LDL  Blood Gases: No results found for: PH, PCO2, PO2, HCO3, O2SAT  Thyroid functions:   Lab Results   Component Value Date    TSH 5.92 04/18/2019        Imaging/Diagonstics:      CXR: No acute cardiopulmonary findings. ASSESSMENT     Active Problems:    Ischemic stroke (HCC)    Tissue plasminogen activator (t-PA) administered at other facility within 24 hours prior to current admission    Hypoglycemia    History of diabetes mellitus  Resolved Problems:    * No resolved hospital problems. *    PLAN      1. Hypoglycemia: Patient is currently on D5 at 75 mL/h, the patient's blood sugar has improved, will try to wean off of D5 once the patient starts eating, hold off on any short-acting insulin due to increased risk of hypoglycemia. Patient's A1c 6.4, putting him in the range of prediabetes, will likely not need insulin on discharge can be discharged on metformin. 2. Ischemic stroke: Status post tPA at outlying facility, CTA showing more than 90 person ICA stenosis: Management as per primary. 3. Thank you for the consult. We will continue to follow. Janak Huerta MD      Department of Internal Medicine  [facility]         4/28/2019, 3:14 PM      IM Attending    Pt seen and examined today   I have discussed the care of pt , including pertinent history and exam findings,  with the resident. I have reviewed the key elements of all parts of the encounter with the resident. I agree with the assessment, plan and orders as documented by the resident.     Electronically signed by Norbert Stubbs MD on 4/28/2019 at 11:52 PM

## 2019-04-28 NOTE — PROGRESS NOTES
Since receiving Versed in MRI, pt has had increased confusion, attempting to crawl out of bed constantly despite frequent redirections. Notified Dr. Pineda Scruggs, orders received for Haldol.

## 2019-04-28 NOTE — PROGRESS NOTES
0300:  Pt agitated, attempting to crawl out of bed, yelling. Notified Dr. Cari Osullivan, orders received for Fentanyl. 0320: Pt resting with eyes closed, quiet, appears to be sleeping. Respirations even and nonlabored. 0410: Pt agitated, yelling, attempting to crawl out of bed. Roll belt applied. Notified Dr. Cari Osullivan, orders received for additional dose of Fentanyl. 0430: Pt resting with eyes closed, respirations even and nonlabored. 0450: Pt agitated, yelling, attempting to crawl out of bed. Sitter placed at bedside for safety.  Notified Dr. Cari Osullivan.

## 2019-04-28 NOTE — PROGRESS NOTES
Dr. Maxi Beasley                                                                       Admission Note/H&P        Patient:  Aman Andrade  YOB: 1931    MRN: [id number]     Acct: [de-identified]     Admit date: 4/26/2019    Pt seen and Chart reviewed. Consultant notes reviewed and outpatient/ ED care evaluated.     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39       Diet:  No diet orders on file      Medications:Current Inpatient    Scheduled Meds:  Continuous Infusions:  PRN Meds:    Objective:    Physical Exam:  Vitals: BP (!) 171/70   Pulse 61   Temp 97.4 °F (36.3 °C)   Resp 20   Ht 6' 3\" (1.905 m)   Wt 202 lb (91.6 kg)   SpO2 94%   BMI 25.25 kg/m²   Height and weight:    Wt Readings from Last 3 Encounters:   04/27/19 217 lb 11.2 oz (98.7 kg)   04/26/19 202 lb (91.6 kg)   04/24/19 206 lb (93.4 kg)      [unfilled]        Labs:-    CBC:   Recent Labs     04/27/19  0623 04/27/19  1140 04/28/19  0856   WBC 7.3 9.7 10.1   HGB 12.3* 12.4* 12.8*    259 266 BMP:    Recent Labs     04/27/19  0623 04/27/19  1140 04/28/19  0856    140 136   K 3.7 4.3 4.4    108* 104   CO2 20 23 21   BUN 18 14 12   CREATININE 1.02 1.09 1.16   GLUCOSE 153* 83 146*     Glucose:  Recent Labs     04/27/19  1542 04/27/19  2331 04/27/19  2348 04/28/19  0014 04/28/19  0133 04/28/19  0614 04/28/19  0750 04/28/19  1053   POCGLU 110 68* 69* 84 137* 128* 122* 154*     HgbA1C: No results for input(s): LABA1C in the last 72 hours. INR:   Recent Labs     04/26/19  1834   INR 1.0     CARDIAC ENZYMES:  Recent Labs     04/26/19  1834   CKTOTAL 45   CKMB 1.6     BNP: No results for input(s): BNP in the last 72 hours. Lipids: No results for input(s): CHOL, TRIG, HDL, LDLCALC in the last 72 hours.     Invalid input(s): LDL  ABGs: No results found for: PH, PCO2, PO2, HCO3, O2SAT  Thyroid:   Lab Results   Component Value Date    TSH 5.92 04/18/2019      Urinalysis:   Color, UA   Date Value Ref Range Status   04/26/2019 YELLOW YELLOW Final     pH, UA   Date Value Ref Range Status   04/26/2019 5.5 5.0 - 8.0 Final     Specific Gravity, UA   Date Value Ref Range Status   04/26/2019 1.022 1.000 - 1.030 Final     Protein, UA   Date Value Ref Range Status   04/26/2019 NEGATIVE NEGATIVE Final     RBC, UA   Date Value Ref Range Status   04/26/2019 0 TO 2 /HPF Final     Bacteria, UA   Date Value Ref Range Status   04/26/2019 NOT REPORTED None Final     Nitrite, Urine   Date Value Ref Range Status   04/26/2019 NEGATIVE NEGATIVE Final     WBC, UA   Date Value Ref Range Status   04/26/2019 0 TO 2 /HPF Final     Leukocyte Esterase, Urine   Date Value Ref Range Status   04/26/2019 NEGATIVE NEGATIVE Final     Yeast, UA   Date Value Ref Range Status   04/26/2019 NOT REPORTED None Final     Glucose, UA   Date Value Ref Range Status   04/18/2012 NEGATIVE NEG Final     Glucose, Ur   Date Value Ref Range Status   04/26/2019 NEGATIVE NEGATIVE Final     Bilirubin, Urine   Date Value Ref Range Status   04/18/2012 NEGATIVE NEG Final     Bilirubin Urine   Date Value Ref Range Status   04/26/2019 NEGATIVE NEGATIVE Final       CULTURES:    Current Rehabilitation Assessments:  PHYSICAL THERAPY:     OCCUPATIONAL THERAPY:     SPEECH:      Assessment:  Active Problems:    Received intravenous tissue plasminogen activator (t-PA) in emergency department  Resolved Problems:    * No resolved hospital problems.  *      Plan:  1. to [facility] MD             4/28/2019, 12:54 PM

## 2019-04-28 NOTE — PROGRESS NOTES
Speech Language Pathology  [facility]  Speech Language Pathology    Date: 4/28/2019  Patient Name: Aman Andrade  YOB: 1931   AGE: 80 y.o. MRN: [id number]        Patient Not Available for Speech Therapy     Due to:  [] Testing  [] Hemodialysis  [] Cancelled by RN  [] Surgery   [] Intubation/Sedation/Pain Medication  [] Medical instability  [x] Other: Unable to complete ST evaluation this date as pt lethargic. Pt agreed to ST evaluation, answered orientation questions x4, and then fell asleep. Did not wake to ST verbal & tactile cues. Pt stated first name, age, month/day of birth accurately & independently. Pt stated he was \"at my parent's house\" when asked place. ST to attempt evaluation when pt more alert & awake. Next scheduled treatment: 4/29/19  Completed by:  Diana Gee M.S. CF-SLP

## 2019-04-28 NOTE — H&P
Recent Labs     04/27/19  0623 04/27/19  1140 04/28/19  0856    140 136   K 3.7 4.3 4.4    108* 104   CO2 20 23 21   BUN 18 14 12   CREATININE 1.02 1.09 1.16   GLUCOSE 153* 83 146*      Glucose:             Recent Labs     04/27/19  1542 04/27/19  2331 04/27/19  2348 04/28/19  0014 04/28/19  0133 04/28/19  0614 04/28/19  0750 04/28/19  1053   POCGLU 110 68* 69* 84 137* 128* 122* 154*      HgbA1C: No results for input(s): LABA1C in the last 72 hours. INR:       Recent Labs     04/26/19  1834   INR 1.0      CARDIAC ENZYMES:      Recent Labs     04/26/19  1834   CKTOTAL 45   CKMB 1.6      BNP: No results for input(s): BNP in the last 72 hours.   Lipids: No results for input(s): CHOL, TRIG, HDL, LDLCALC in the last 72 hours.     Invalid input(s): LDL  ABGs: No results found for: PH, PCO2, PO2, HCO3, O2SAT  Thyroid:         Lab Results   Component Value Date     TSH 5.92 04/18/2019      Urinalysis:         Color, UA   Date Value Ref Range Status   04/26/2019 YELLOW YELLOW Final            pH, UA   Date Value Ref Range Status   04/26/2019 5.5 5.0 - 8.0 Final            Specific Gravity, UA   Date Value Ref Range Status   04/26/2019 1.022 1.000 - 1.030 Final            Protein, UA   Date Value Ref Range Status   04/26/2019 NEGATIVE NEGATIVE Final            RBC, UA   Date Value Ref Range Status   04/26/2019 0 TO 2 /HPF Final            Bacteria, UA   Date Value Ref Range Status   04/26/2019 NOT REPORTED None Final            Nitrite, Urine   Date Value Ref Range Status   04/26/2019 NEGATIVE NEGATIVE Final            WBC, UA   Date Value Ref Range Status   04/26/2019 0 TO 2 /HPF Final            Leukocyte Esterase, Urine   Date Value Ref Range Status   04/26/2019 NEGATIVE NEGATIVE Final            Yeast, UA   Date Value Ref Range Status   04/26/2019 NOT REPORTED None Final            Glucose, UA   Date Value Ref Range Status   04/18/2012 NEGATIVE NEG Final            Glucose, Ur   Date Value Ref Range Status

## 2019-04-28 NOTE — PROGRESS NOTES
Daily Progress Note  Neuro Critical Care    Patient Name: Mikie Fisher  Patient : 3/12/1931  Room/Bed: 0521/0521-01  Allergies: Allergies   Allergen Reactions    Bactrim [Sulfamethoxazole-Trimethoprim]      Unknown reaction    Other      baset    Sulfa Antibiotics Other (See Comments)     psoriasis    Sulfur      Causes pt to break out psorsias    Versed [Midazolam] Other (See Comments)     Extreme confusion and agitation    Pcn [Penicillins] Hives     Also causes pt's throat to swell a little. Problem List:   Patient Active Problem List   Diagnosis    Lumbosacral spondylosis without myelopathy    Degeneration of lumbar or lumbosacral intervertebral disc    Spinal stenosis, lumbar region, with neurogenic claudication    Lumbar spondylolysis    Postural kyphosis of lumbar region    Arthritis of right hip    Right hip pain    Weight loss    Right lower quadrant abdominal pain    Anemia    Dizziness    Internal carotid artery stenosis, right    Received intravenous tissue plasminogen activator (t-PA) in emergency department    Ischemic stroke ([facility] [address]    Tissue plasminogen activator (t-PA) administered at other facility within 24 hours prior to current admission    Hypoglycemia    History of diabetes mellitus         INTERVAL HISTORY      Hospital Course:   81 y/o who came via mobile stroke unit to [address] [place] [address] due to acute onset of  AMS, dysarthria and left side hemiparesis at 4:30 pm with [facility]  Of 3:30 pm. Patient wife found him in living room mumbling words at 4:30 and he checked his BS it was 120 and called EMS who enroute checked BS and it was 37 at one time and he received  D50 x1 which improved his mental status but on arrival to [facility] he was drowsy and difficulty to arouse and appeared confussed however his left side weakness had resolved, he had Ct head done which showed ischemic changed and ctah/n showed 90% stenosis of L ICA. Patietn received t-PA.     LWKN : 3:30 pm  NIHSS: 4  A      He received t-PA at [facility] at 7 pm on  and si transferred here for further monitering        Last 24h:   Patient sbp <180 overnight , afebrile  MRI 24 hr post t-Pa showed chronic small vessel changes  and no acute stroke or bleed. Patient BS dropped to 68-69 overnight he received range juice and it didn't help and was started on [email or web address] and poc  this am is 128. Patient was agitated overnight received versed x2, haladol x2 and 100 of fentanyl. Patient to be on ASA 81mg and Plavix 75 mg this am and as per endovascular plan for GEM stent at 11 am but due to poor glycemic control plan to take to intervention later during the week, will get IM consulted for DM mangement and make pt stepdown if stable    CURRENT MEDICATIONS:  SCHEDULED MEDICATIONS:   hydrALAZINE        QUEtiapine  25 mg Oral BID    sodium chloride flush  10 mL Intravenous 2 times per day    atorvastatin  40 mg Oral Nightly    aspirin  325 mg Oral Once    clopidogrel  300 mg Oral Once    aspirin  81 mg Oral Daily    clopidogrel  75 mg Oral Daily     CONTINUOUS INFUSIONS:   dextrose 75 mL/hr at 19 0015    dextrose Stopped (19 1510)     PRN MEDICATIONS:   hydrALAZINE, sodium chloride flush, magnesium hydroxide, ondansetron, glucose, dextrose, glucagon (rDNA), dextrose, sodium chloride flush    VITALS:  Temperature Range: Temp: 98.6 °F (37 °C) Temp  Av.3 °F (36.8 °C)  Min: 97.9 °F (36.6 °C)  Max: 98.6 °F (37 °C)  BP Range: Systolic (76JRK), QDE:816 , Min:123 , LNP:216     Diastolic (70MQS), XXK:44, Min:42, Max:102    Pulse Range: Pulse  Av.9  Min: 61  Max: 112  Respiration Range: Resp  Av.4  Min: 16  Max: 25  Current Pulse Ox: SpO2: 97 %  24HR Pulse Ox Range: SpO2  Av.6 %  Min: 94 %  Max: 98 %  Patient Vitals for the past 12 hrs:   BP Temp Temp src Pulse Resp SpO2   19 0607 (!) 157/93 -- -- 107 17 97 %   19 0507 (!) 132/51 -- -- 82 17 98 %   19 0432 (!) 148/49 -- -- 91 17 94 %   04/28/19 0412 (!) 151/93 98.6 °F (37 °C) Axillary 96 22 98 %   04/28/19 0332 123/60 -- -- 87 17 96 %   04/28/19 0302 (!) 161/64 -- -- 105 24 --   04/28/19 0202 (!) 141/58 -- -- 107 21 --   04/28/19 0137 (!) 139/42 -- -- 112 25 --   04/28/19 0102 (!) 200/70 -- -- 93 24 96 %   04/28/19 0022 (!) 161/99 -- -- 97 22 98 %   04/28/19 0007 (!) 208/86 -- -- 77 24 97 %   04/28/19 0002 (!) 197/81 98.6 °F (37 °C) Axillary 78 20 97 %   04/27/19 2327 134/72 -- -- 79 19 96 %   04/27/19 2102 (!) 176/84 -- -- 78 20 94 %   04/27/19 2002 (!) 177/69 97.9 °F (36.6 °C) Oral 75 18 96 %     Estimated body mass index is 27.21 kg/m² as calculated from the following:    Height as of this encounter: 6' 3\" (1.905 m).     Weight as of this encounter: 217 lb 11.2 oz (98.7 kg).  []<16 Severe malnutrition  []16-16.99 Moderate malnutrition  []17-18.49 Mild malnutrition  [x]18.5-24.9 Normal  []25-29.9 Overweight (not obese)  []30-34.9 Obese class 1 (Low Risk)  []35-39.9 Obese class 2 (Moderate Risk)  []?40 Obese class 3 (High Risk)    RECENT LABS:   Lab Results   Component Value Date    WBC 9.7 04/27/2019    HGB 12.4 (L) 04/27/2019    HCT 38.6 (L) 04/27/2019     04/27/2019    CHOL 139 04/18/2019    TRIG 104 04/18/2019    HDL 55 04/18/2019    ALT 13 04/18/2019    AST 21 04/18/2019     04/27/2019    K 4.3 04/27/2019     (H) 04/27/2019    CREATININE 1.09 04/27/2019    BUN 14 04/27/2019    CO2 23 04/27/2019    TSH 5.92 (H) 04/18/2019    PSA 0.04 04/18/2019    INR 1.0 04/26/2019    LABA1C 7.0 (H) 09/14/2018     24 HOUR INTAKE/OUTPUT:    Intake/Output Summary (Last 24 hours) at 4/28/2019 0728  Last data filed at 4/28/2019 0600  Gross per 24 hour   Intake 1001.51 ml   Output 1525 ml   Net -523.49 ml       RADIOLOGY:   Xr Chest Standard (2 Vw)    Result Date: 4/18/2019  EXAMINATION: TWO VIEWS OF THE CHEST 4/18/2019 4:51 pm COMPARISON: Chest x-ray 08/04/2018 HISTORY: ORDERING SYSTEM PROVIDED HISTORY: Chronic obstructive pulmonary disease, unspecified COPD type [facility]) TECHNOLOGIST PROVIDED HISTORY: Ordering Physician Provided Reason for Exam: Shortness of breath Acuity: Acute Type of Exam: Initial Relevant Medical/Surgical History: COPD FINDINGS: Chronic lung changes are present. No new parenchymal opacity. Costophrenic angles are clear. Cardiac and mediastinal structures are unchanged. The bones are unchanged. Chronic lung changes. No acute abnormality. Xr Abdomen (kub) (single Ap View)    Result Date: 4/27/2019  EXAMINATION: SINGLE XRAY VIEW OF THE CHEST; SINGLE SUPINE XRAY VIEW(S) OF THE ABDOMEN 4/27/2019 8:06 pm COMPARISON: Earlier today. HISTORY: ORDERING SYSTEM PROVIDED HISTORY: Lung sounds crackles TECHNOLOGIST PROVIDED HISTORY: Lung sounds crackles Ordering Physician Provided Reason for Exam: lung sounds crackles; ORDERING SYSTEM PROVIDED HISTORY: MRI clearance TECHNOLOGIST PROVIDED HISTORY: MRI clearance Ordering Physician Provided Reason for Exam: MRI clearance FINDINGS: Chest: Cardiac and mediastinal contours are stable. There is stable chronic reticular opacities in the lungs without consolidation or edema. No pneumothorax or pleural effusion is seen. No acute osseous abnormality is identified. There is no radiopaque foreign body. Abdomen: Nonspecific, nonobstructive bowel gas pattern. Contrast in the urinary bladder. No pathologic calcification. L4 through S1 posterior lumbar fusion hardware and right hip arthroplasty hardware are noted. No radiopaque foreign body is seen otherwise. 1. No acute cardiopulmonary abnormality. 2. No evidence of bowel obstruction.      Ct Head Wo Contrast    Result Date: 4/27/2019  EXAMINATION: CT OF THE HEAD WITHOUT CONTRAST  4/27/2019 6:41 am TECHNIQUE: CT of the head was performed without the administration of intravenous contrast. Dose modulation, iterative reconstruction, and/or weight based adjustment of the mA/kV was utilized to reduce the radiation dose to as low as reasonably achievable. COMPARISON: 04/26/2019 HISTORY: ORDERING SYSTEM PROVIDED HISTORY: 12 hours s/p tPA administration TECHNOLOGIST PROVIDED HISTORY: Ordering Physician Provided Reason for Exam: 12 hours s/p tPA administration Acuity: Acute Type of Exam: Subsequent/Follow-up FINDINGS: BRAIN/VENTRICLES: There is prominence of the ventricles and cortical sulci consistent with cortical volume loss. No midline shift. Basal cisterns are normally outlined. There is no clear evidence for acute infarct. No acute intra or extra-axial hemorrhage. Internal carotid artery calcifications noted. ORBITS: The visualized portion of the orbits demonstrate no acute abnormality. SINUSES: The visualized paranasal sinuses and mastoid air cells demonstrate no acute abnormality. SOFT TISSUES/SKULL:  No acute abnormality of the visualized skull or soft tissues. 1. No acute intracranial hemorrhage. 2. Cerebral atrophy and chronic small vessel ischemic changes in the white matter. Ct Head Wo Contrast    Result Date: 4/26/2019  EXAMINATION: CT OF THE HEAD WITHOUT CONTRAST  4/26/2019 5:25 pm TECHNIQUE: CT of the head was performed without the administration of intravenous contrast. Dose modulation, iterative reconstruction, and/or weight based adjustment of the mA/kV was utilized to reduce the radiation dose to as low as reasonably achievable. COMPARISON: August 4, 2018 HISTORY: ORDERING SYSTEM PROVIDED HISTORY: STROKE TECHNOLOGIST PROVIDED HISTORY: FINDINGS: BRAIN/VENTRICLES: Ventricles sulci are stable. Artifact limits the posterior fossa. There is no mass effect on the 4th ventricle. Small calcifications in the posterior fossa are again noted. Multifocal low-density in the periventricular and subcortical white matter is noted bilaterally. Findings are slightly greater in the right frontal region extending posteriorly to the frontal parietal junction. There may be subtle extension to the right frontal cortex.   No hyperdense arteries are noted. Vascular calcifications are noted. There is no hemorrhage ORBITS: The visualized portion of the orbits demonstrate no acute abnormality. SINUSES: The visualized paranasal sinuses and mastoid air cells demonstrate no acute abnormality. SOFT TISSUES/SKULL:  No acute abnormality of the visualized skull or soft tissues. Limitation due to artifact Multifocal small-vessel ischemic change bilaterally. Focal asymmetric low density in the right frontal subcortical white matter with questionable extension of the cortex. This was present on the prior. Subtle increase would be difficult to exclude. If stroke symptoms continue consider MRI. Critical results were called by Dr. Lebron Ta to Faye Henley on 4/26/2019 at 17:41. Xr Chest Portable    Result Date: 4/27/2019  EXAMINATION: SINGLE XRAY VIEW OF THE CHEST; SINGLE SUPINE XRAY VIEW(S) OF THE ABDOMEN 4/27/2019 8:06 pm COMPARISON: Earlier today. HISTORY: ORDERING SYSTEM PROVIDED HISTORY: Lung sounds crackles TECHNOLOGIST PROVIDED HISTORY: Lung sounds crackles Ordering Physician Provided Reason for Exam: lung sounds crackles; ORDERING SYSTEM PROVIDED HISTORY: MRI clearance TECHNOLOGIST PROVIDED HISTORY: MRI clearance Ordering Physician Provided Reason for Exam: MRI clearance FINDINGS: Chest: Cardiac and mediastinal contours are stable. There is stable chronic reticular opacities in the lungs without consolidation or edema. No pneumothorax or pleural effusion is seen. No acute osseous abnormality is identified. There is no radiopaque foreign body. Abdomen: Nonspecific, nonobstructive bowel gas pattern. Contrast in the urinary bladder. No pathologic calcification. L4 through S1 posterior lumbar fusion hardware and right hip arthroplasty hardware are noted. No radiopaque foreign body is seen otherwise. 1. No acute cardiopulmonary abnormality. 2. No evidence of bowel obstruction.      Xr Chest Portable    Result Date: 4/27/2019  EXAMINATION: SINGLE XRAY VIEW OF THE CHEST 4/27/2019 9:40 am COMPARISON: 04/18/2019 HISTORY: ORDERING SYSTEM PROVIDED HISTORY: eval lungs TECHNOLOGIST PROVIDED HISTORY: eval lungs FINDINGS: The heart and mediastinal structures are stable. Increased interstitial markings are present compatible with chronic interstitial lung disease no new pulmonary process is seen. The appearance of lungs are similar to the study from 07/06/2018. Stable chest chronic interstitial lung disease. Cta Neck W Contrast    Result Date: 4/27/2019  EXAMINATION: CTA OF THE NECK; CTA OF THE HEAD WITH CONTRAST 4/27/2019 12:07 am: TECHNIQUE: CTA of the neck was performed with the administration of intravenous contrast. Multiplanar reformatted images are provided for review. MIP images are provided for review. Stenosis of the internal carotid arteries measured using NASCET criteria. Dose modulation, iterative reconstruction, and/or weight based adjustment of the mA/kV was utilized to reduce the radiation dose to as low as reasonably achievable.; CTA of the head/brain was performed with the administration of intravenous contrast. Multiplanar reformatted images are provided for review. MIP images are provided for review. Dose modulation, iterative reconstruction, and/or weight based adjustment of the mA/kV was utilized to reduce the radiation dose to as low as reasonably achievable. 3D reconstructed images were performed on a separate workstation and provided for review. COMPARISON: CT neck 07/08/2008 CT head 04/26/2019 HISTORY: ORDERING SYSTEM PROVIDED HISTORY: stroke like symptoms, [facility] 4 TECHNOLOGIST PROVIDED HISTORY: Ordering Physician Provided Reason for Exam: stroke-like symptoms Acuity: Acute Type of Exam: Unknown FINDINGS: CTA NECK: AORTIC ARCH/ARCH VESSELS: There is a normal branch pattern of the aortic arch. No significant stenosis is seen of the innominate artery or subclavian arteries.  CAROTID ARTERIES: The common CTA OF THE HEAD WITH CONTRAST 4/27/2019 12:07 am: TECHNIQUE: CTA of the neck was performed with the administration of intravenous contrast. Multiplanar reformatted images are provided for review. MIP images are provided for review. Stenosis of the internal carotid arteries measured using NASCET criteria. Dose modulation, iterative reconstruction, and/or weight based adjustment of the mA/kV was utilized to reduce the radiation dose to as low as reasonably achievable.; CTA of the head/brain was performed with the administration of intravenous contrast. Multiplanar reformatted images are provided for review. MIP images are provided for review. Dose modulation, iterative reconstruction, and/or weight based adjustment of the mA/kV was utilized to reduce the radiation dose to as low as reasonably achievable. 3D reconstructed images were performed on a separate workstation and provided for review. COMPARISON: CT neck 07/08/2008 CT head 04/26/2019 HISTORY: ORDERING SYSTEM PROVIDED HISTORY: stroke like symptoms, NIH 4 TECHNOLOGIST PROVIDED HISTORY: Ordering Physician Provided Reason for Exam: stroke-like symptoms Acuity: Acute Type of Exam: Unknown FINDINGS: CTA NECK: AORTIC ARCH/ARCH VESSELS: There is a normal branch pattern of the aortic arch. No significant stenosis is seen of the innominate artery or subclavian arteries. CAROTID ARTERIES: The common carotid arteries demonstrate mild atherosclerotic changes without evidence of a flow limiting stenosis. Heavy atherosclerotic plaque identified right carotid bifurcation extending into the right proximal ICA resulting in subtotal, greater than 90% stenosis right proximal ICA per NASCET criteria. .  The left internal carotid artery demonstrates mild plaque left carotid bifurcation. Normal in appearance without evidence of a flow limiting stenosis by NASCET criteria. No dissection or arterial injury is seen.  VERTEBRAL ARTERIES: The vertebral arteries both arise from the subclavian arteries and are normal in caliber without evidence of flow limiting stenosis or dissection. SOFT TISSUES: The lung apices are clear. No cervical or superior mediastinal lymphadenopathy. The visualized portion of the larynx and pharynx appear unremarkable. The parotid, submandibular and thyroid glands demonstrate no acute abnormality. BONES: The visualized osseous structures demonstrate multilevel degenerative changes. There subpleural areas of interstitial thickening suggestive underlying airspace disease involving both lung apices. . CTA HEAD: ANTERIOR CIRCULATION: Moderate plaque identified involving the cavernous and supraclinoid ICAs resulting in up to 40-50% stenosis involving the proximal cavernous segments. The anterior cerebral and middle cerebral arteries demonstrate no focal stenosis. POSTERIOR CIRCULATION: The posterior cerebral arteries demonstrate no focal stenosis. The vertebral and basilar arteries appear unremarkable. BRAIN: No mass effect or midline shift. No abnormal extra-axial fluid collection. The gray-white differentiation appears grossly maintained. 90% or greater subtotal stenosis identified right proximal ICA level carotid bifurcation. No hemodynamic stenosis or occlusion identified involving intracranial arterial circulation     Mri Brain W Wo Contrast    Result Date: 4/27/2019  EXAMINATION: MRI OF THE BRAIN WITHOUT AND WITH CONTRAST  4/27/2019 11:14 pm TECHNIQUE: Multiplanar multisequence MRI of the head/brain was performed without and with the administration of intravenous contrast. COMPARISON: None. HISTORY: ORDERING SYSTEM PROVIDED HISTORY: post TPA, stroke symptoms w 90% L ICA stenosis. FINDINGS: Mild-to-moderate motion degradation challenge evaluation. INTRACRANIAL STRUCTURES/VENTRICLES:  No evidence of restricted diffusion to suggest acute territory infarction.   Moderate generalized involutional change brain parenchyma identified with prominence of ventricles and Right:  downgoing  Left:  downgoing     Clonus:  absent  Varela's:  absent     Coordination/Dysmetria:  Heel to Shin:  Right:  normal  Finger to Nose:   Right:  normal   Dysdiadochokinesia:  absent     Gait:  Not tested         DRAINS:  [x] There are no drains for Neuro Critical Care to monitor at this time. ASSESSMENT AND PLAN:       81 y/o  Had acute osnet of dysarthria and left sie weakness s/p t-PA at [facility] at 7 pn 4/26 and transferred        NEUROLOGIC:  - Ct head showed ischemic changes, CTaH/N showed 90% of stenosis to L ICA   MRI 24 hr post t-Pa showed chronic small vessel changes  and no acute stroke or bleed. - as per endovascular pt will be going for R ICA stent this week after better glycmic control  - Goal SBP <180  -labetalol, hydralazine and Cardene prn  - will start ASA 81 mg and Plavix 75 mg  - geodon prn for agitation  - Neuro checks per protocol     CARDIOVASCULAR:  - Goal SBP <180  - statin 40 mg  - Continue telemetry  -labetalol, hydralazine and Cardene prn  - f/u echo     PULMONARY:  -Monitor saturation     RENAL/FLUID/ELECTROLYTE:  - BUN 18/ Creatinine 1.02  - IVF@ 75 cc/hr  - Replace electrolytes PRN  - Daily BMP     GI/NUTRITION:  NUTRITION:  - advance diet as toelrated  - Bowel regimen: MoM  - GI prophylaxis: Pepcid     ID:  -afebrile  - Continue to monitor for fevers  - Daily CBC     HEME:   - H&H 12.3/37.5  - Platelets 758  - Daily CBC     ENDOCRINE:  - Continue to monitor blood glucose, goal <180  - hypoglycemia protocol, IM on board for glycemic control     OTHER:  - PT/OT/ST   - Code Status: full           DVT PROPHYLAXIS:  - SCD sleeves - Thigh High   - EMMA stockings - Thigh High               DISPOSITION:  [x] To remain ICU:   [] OK for out of ICU from Neuro Critical Care standpoint    We will continue to follow along. For any changes in exam or patient status please contact Neuro Critical Care.       Margaretha Severs, MD  Neuro Critical Care  Pager [phone]  4/28/2019

## 2019-04-28 NOTE — PROGRESS NOTES
Lung sounds with crackles throughout upon auscultation. Maintaining SPO2 >96% on room air. Notified Dr. Domingo Wilkins, orders received for chest xray.

## 2019-04-29 ENCOUNTER — ANESTHESIA EVENT (OUTPATIENT)
Dept: INTERVENTIONAL RADIOLOGY/VASCULAR | Age: 84
DRG: 035 | End: 2019-04-29
Payer: MEDICARE

## 2019-04-29 ENCOUNTER — APPOINTMENT (OUTPATIENT)
Dept: INTERVENTIONAL RADIOLOGY/VASCULAR | Age: 84
DRG: 035 | End: 2019-04-29
Attending: PSYCHIATRY & NEUROLOGY
Payer: MEDICARE

## 2019-04-29 ENCOUNTER — ANESTHESIA (OUTPATIENT)
Dept: INTERVENTIONAL RADIOLOGY/VASCULAR | Age: 84
DRG: 035 | End: 2019-04-29
Payer: MEDICARE

## 2019-04-29 VITALS — OXYGEN SATURATION: 98 % | SYSTOLIC BLOOD PRESSURE: 162 MMHG | DIASTOLIC BLOOD PRESSURE: 72 MMHG

## 2019-04-29 LAB
ACTIVATED CLOTTING TIME: 140 SEC (ref 79–149)
ACTIVATED CLOTTING TIME: 225 SEC (ref 79–149)
ANION GAP SERPL CALCULATED.3IONS-SCNC: 11 MMOL/L (ref 9–17)
BUN BLDV-MCNC: 15 MG/DL (ref 8–23)
BUN/CREAT BLD: ABNORMAL (ref 9–20)
CALCIUM SERPL-MCNC: 9.2 MG/DL (ref 8.6–10.4)
CHLORIDE BLD-SCNC: 103 MMOL/L (ref 98–107)
CO2: 21 MMOL/L (ref 20–31)
CREAT SERPL-MCNC: 1.13 MG/DL (ref 0.7–1.2)
GFR AFRICAN AMERICAN: >60 ML/MIN
GFR NON-AFRICAN AMERICAN: >60 ML/MIN
GFR SERPL CREATININE-BSD FRML MDRD: ABNORMAL ML/MIN/{1.73_M2}
GFR SERPL CREATININE-BSD FRML MDRD: ABNORMAL ML/MIN/{1.73_M2}
GLUCOSE BLD-MCNC: 135 MG/DL (ref 75–110)
GLUCOSE BLD-MCNC: 140 MG/DL (ref 75–110)
GLUCOSE BLD-MCNC: 148 MG/DL (ref 75–110)
GLUCOSE BLD-MCNC: 181 MG/DL (ref 75–110)
GLUCOSE BLD-MCNC: 187 MG/DL (ref 70–99)
GLUCOSE BLD-MCNC: 234 MG/DL (ref 75–110)
GROWTH HORMONE: 0.51 NG/ML (ref 0.01–1)
HCT VFR BLD CALC: 40.1 % (ref 40.7–50.3)
HEMOGLOBIN: 12.9 G/DL (ref 13–17)
HGH COLLECTION INFO: NORMAL
MCH RBC QN AUTO: 29.7 PG (ref 25.2–33.5)
MCHC RBC AUTO-ENTMCNC: 32.2 G/DL (ref 28.4–34.8)
MCV RBC AUTO: 92.2 FL (ref 82.6–102.9)
NRBC AUTOMATED: 0 PER 100 WBC
PDW BLD-RTO: 14.1 % (ref 11.8–14.4)
PLATELET # BLD: 260 K/UL (ref 138–453)
PMV BLD AUTO: 9.7 FL (ref 8.1–13.5)
POTASSIUM SERPL-SCNC: 4.8 MMOL/L (ref 3.7–5.3)
RBC # BLD: 4.35 M/UL (ref 4.21–5.77)
SODIUM BLD-SCNC: 135 MMOL/L (ref 135–144)
WBC # BLD: 9.6 K/UL (ref 3.5–11.3)

## 2019-04-29 PROCEDURE — C1894 INTRO/SHEATH, NON-LASER: HCPCS

## 2019-04-29 PROCEDURE — C1725 CATH, TRANSLUMIN NON-LASER: HCPCS

## 2019-04-29 PROCEDURE — 6370000000 HC RX 637 (ALT 250 FOR IP): Performed by: STUDENT IN AN ORGANIZED HEALTH CARE EDUCATION/TRAINING PROGRAM

## 2019-04-29 PROCEDURE — C1769 GUIDE WIRE: HCPCS

## 2019-04-29 PROCEDURE — 6360000002 HC RX W HCPCS: Performed by: NURSE ANESTHETIST, CERTIFIED REGISTERED

## 2019-04-29 PROCEDURE — 2580000003 HC RX 258: Performed by: ANESTHESIOLOGY

## 2019-04-29 PROCEDURE — 2580000003 HC RX 258: Performed by: STUDENT IN AN ORGANIZED HEALTH CARE EDUCATION/TRAINING PROGRAM

## 2019-04-29 PROCEDURE — 6370000000 HC RX 637 (ALT 250 FOR IP): Performed by: PSYCHIATRY & NEUROLOGY

## 2019-04-29 PROCEDURE — 2580000003 HC RX 258: Performed by: HOSPITALIST

## 2019-04-29 PROCEDURE — 85347 COAGULATION TIME ACTIVATED: CPT

## 2019-04-29 PROCEDURE — 2500000003 HC RX 250 WO HCPCS: Performed by: NURSE ANESTHETIST, CERTIFIED REGISTERED

## 2019-04-29 PROCEDURE — 36620 INSERTION CATHETER ARTERY: CPT

## 2019-04-29 PROCEDURE — 85027 COMPLETE CBC AUTOMATED: CPT

## 2019-04-29 PROCEDURE — 2580000003 HC RX 258: Performed by: NURSE ANESTHETIST, CERTIFIED REGISTERED

## 2019-04-29 PROCEDURE — 3700000000 HC ANESTHESIA ATTENDED CARE

## 2019-04-29 PROCEDURE — 80048 BASIC METABOLIC PNL TOTAL CA: CPT

## 2019-04-29 PROCEDURE — C1876 STENT, NON-COA/NON-COV W/DEL: HCPCS

## 2019-04-29 PROCEDURE — 2060000000 HC ICU INTERMEDIATE R&B

## 2019-04-29 PROCEDURE — 99233 SBSQ HOSP IP/OBS HIGH 50: CPT | Performed by: INTERNAL MEDICINE

## 2019-04-29 PROCEDURE — C1760 CLOSURE DEV, VASC: HCPCS

## 2019-04-29 PROCEDURE — 2500000003 HC RX 250 WO HCPCS: Performed by: STUDENT IN AN ORGANIZED HEALTH CARE EDUCATION/TRAINING PROGRAM

## 2019-04-29 PROCEDURE — 82947 ASSAY GLUCOSE BLOOD QUANT: CPT

## 2019-04-29 PROCEDURE — 99291 CRITICAL CARE FIRST HOUR: CPT | Performed by: PSYCHIATRY & NEUROLOGY

## 2019-04-29 PROCEDURE — 6360000002 HC RX W HCPCS

## 2019-04-29 PROCEDURE — 6360000002 HC RX W HCPCS: Performed by: STUDENT IN AN ORGANIZED HEALTH CARE EDUCATION/TRAINING PROGRAM

## 2019-04-29 PROCEDURE — 6360000004 HC RX CONTRAST MEDICATION: Performed by: PSYCHIATRY & NEUROLOGY

## 2019-04-29 PROCEDURE — 2709999900 HC NON-CHARGEABLE SUPPLY

## 2019-04-29 PROCEDURE — 3700000001 HC ADD 15 MINUTES (ANESTHESIA)

## 2019-04-29 PROCEDURE — 99233 SBSQ HOSP IP/OBS HIGH 50: CPT | Performed by: PSYCHIATRY & NEUROLOGY

## 2019-04-29 PROCEDURE — 36415 COLL VENOUS BLD VENIPUNCTURE: CPT

## 2019-04-29 PROCEDURE — C1884 EMBOLIZATION PROTECT SYST: HCPCS

## 2019-04-29 PROCEDURE — 37215 TRANSCATH STENT CCA W/EPS: CPT | Performed by: PSYCHIATRY & NEUROLOGY

## 2019-04-29 RX ORDER — CLINDAMYCIN PHOSPHATE 600 MG/50ML
INJECTION INTRAVENOUS PRN
Status: DISCONTINUED | OUTPATIENT
Start: 2019-04-29 | End: 2019-04-29 | Stop reason: SDUPTHER

## 2019-04-29 RX ORDER — FENTANYL CITRATE 50 UG/ML
INJECTION, SOLUTION INTRAMUSCULAR; INTRAVENOUS
Status: COMPLETED
Start: 2019-04-29 | End: 2019-04-29

## 2019-04-29 RX ORDER — FENTANYL CITRATE 50 UG/ML
INJECTION, SOLUTION INTRAMUSCULAR; INTRAVENOUS PRN
Status: DISCONTINUED | OUTPATIENT
Start: 2019-04-29 | End: 2019-04-29 | Stop reason: SDUPTHER

## 2019-04-29 RX ORDER — PROTAMINE SULFATE 10 MG/ML
INJECTION, SOLUTION INTRAVENOUS PRN
Status: DISCONTINUED | OUTPATIENT
Start: 2019-04-29 | End: 2019-04-29 | Stop reason: SDUPTHER

## 2019-04-29 RX ORDER — SODIUM CHLORIDE, SODIUM LACTATE, POTASSIUM CHLORIDE, CALCIUM CHLORIDE 600; 310; 30; 20 MG/100ML; MG/100ML; MG/100ML; MG/100ML
INJECTION, SOLUTION INTRAVENOUS CONTINUOUS
Status: DISCONTINUED | OUTPATIENT
Start: 2019-04-29 | End: 2019-04-30

## 2019-04-29 RX ORDER — BISACODYL 10 MG
10 SUPPOSITORY, RECTAL RECTAL DAILY PRN
Status: DISCONTINUED | OUTPATIENT
Start: 2019-04-29 | End: 2019-05-01 | Stop reason: HOSPADM

## 2019-04-29 RX ORDER — FENTANYL CITRATE 50 UG/ML
50 INJECTION, SOLUTION INTRAMUSCULAR; INTRAVENOUS EVERY 4 HOURS
Status: DISCONTINUED | OUTPATIENT
Start: 2019-04-29 | End: 2019-04-29

## 2019-04-29 RX ORDER — SODIUM CHLORIDE, SODIUM LACTATE, POTASSIUM CHLORIDE, CALCIUM CHLORIDE 600; 310; 30; 20 MG/100ML; MG/100ML; MG/100ML; MG/100ML
INJECTION, SOLUTION INTRAVENOUS CONTINUOUS PRN
Status: DISCONTINUED | OUTPATIENT
Start: 2019-04-29 | End: 2019-04-29 | Stop reason: SDUPTHER

## 2019-04-29 RX ORDER — IODIXANOL 270 MG/ML
100 INJECTION, SOLUTION INTRAVASCULAR
Status: COMPLETED | OUTPATIENT
Start: 2019-04-29 | End: 2019-04-29

## 2019-04-29 RX ORDER — FENTANYL CITRATE 50 UG/ML
50 INJECTION, SOLUTION INTRAMUSCULAR; INTRAVENOUS EVERY 4 HOURS PRN
Status: DISCONTINUED | OUTPATIENT
Start: 2019-04-29 | End: 2019-05-01 | Stop reason: HOSPADM

## 2019-04-29 RX ORDER — DEXTROSE AND SODIUM CHLORIDE 5; .45 G/100ML; G/100ML
INJECTION, SOLUTION INTRAVENOUS CONTINUOUS
Status: DISCONTINUED | OUTPATIENT
Start: 2019-04-29 | End: 2019-04-30

## 2019-04-29 RX ORDER — SENNA PLUS 8.6 MG/1
2 TABLET ORAL NIGHTLY
Status: DISCONTINUED | OUTPATIENT
Start: 2019-04-29 | End: 2019-05-01 | Stop reason: HOSPADM

## 2019-04-29 RX ORDER — FENTANYL CITRATE 50 UG/ML
50 INJECTION, SOLUTION INTRAMUSCULAR; INTRAVENOUS
Status: DISCONTINUED | OUTPATIENT
Start: 2019-04-29 | End: 2019-04-29

## 2019-04-29 RX ORDER — MIDAZOLAM HYDROCHLORIDE 1 MG/ML
INJECTION INTRAMUSCULAR; INTRAVENOUS PRN
Status: DISCONTINUED | OUTPATIENT
Start: 2019-04-29 | End: 2019-04-29 | Stop reason: SDUPTHER

## 2019-04-29 RX ORDER — METOPROLOL TARTRATE 5 MG/5ML
INJECTION INTRAVENOUS PRN
Status: DISCONTINUED | OUTPATIENT
Start: 2019-04-29 | End: 2019-04-29 | Stop reason: SDUPTHER

## 2019-04-29 RX ORDER — GUAIFENESIN 200 MG/1
200 TABLET ORAL EVERY 6 HOURS PRN
Status: DISCONTINUED | OUTPATIENT
Start: 2019-04-29 | End: 2019-05-01 | Stop reason: HOSPADM

## 2019-04-29 RX ORDER — HEPARIN SODIUM 1000 [USP'U]/ML
INJECTION, SOLUTION INTRAVENOUS; SUBCUTANEOUS PRN
Status: DISCONTINUED | OUTPATIENT
Start: 2019-04-29 | End: 2019-04-29 | Stop reason: SDUPTHER

## 2019-04-29 RX ORDER — ACETAMINOPHEN 325 MG/1
650 TABLET ORAL EVERY 4 HOURS PRN
Status: DISCONTINUED | OUTPATIENT
Start: 2019-04-29 | End: 2019-05-01 | Stop reason: HOSPADM

## 2019-04-29 RX ADMIN — CLINDAMYCIN PHOSPHATE 600 MG: 600 INJECTION, SOLUTION INTRAVENOUS at 14:06

## 2019-04-29 RX ADMIN — HYDRALAZINE HYDROCHLORIDE 10 MG: 20 INJECTION INTRAMUSCULAR; INTRAVENOUS at 18:28

## 2019-04-29 RX ADMIN — QUETIAPINE FUMARATE 25 MG: 25 TABLET ORAL at 20:10

## 2019-04-29 RX ADMIN — DEXTROSE AND SODIUM CHLORIDE: 5; 450 INJECTION, SOLUTION INTRAVENOUS at 10:48

## 2019-04-29 RX ADMIN — PROTAMINE SULFATE 20 MG: 10 INJECTION, SOLUTION INTRAVENOUS at 15:21

## 2019-04-29 RX ADMIN — MIDAZOLAM HYDROCHLORIDE 1 MG: 1 INJECTION, SOLUTION INTRAMUSCULAR; INTRAVENOUS at 13:40

## 2019-04-29 RX ADMIN — Medication 10 ML: at 08:54

## 2019-04-29 RX ADMIN — ASPIRIN 81 MG: 81 TABLET ORAL at 08:52

## 2019-04-29 RX ADMIN — FENTANYL CITRATE 25 MCG: 50 INJECTION INTRAMUSCULAR; INTRAVENOUS at 14:45

## 2019-04-29 RX ADMIN — FENTANYL CITRATE 50 MCG: 50 INJECTION, SOLUTION INTRAMUSCULAR; INTRAVENOUS at 20:15

## 2019-04-29 RX ADMIN — PROTAMINE SULFATE 20 MG: 10 INJECTION, SOLUTION INTRAVENOUS at 15:20

## 2019-04-29 RX ADMIN — METOPROLOL TARTRATE 1 MG: 1 INJECTION, SOLUTION INTRAVENOUS at 15:25

## 2019-04-29 RX ADMIN — SODIUM CHLORIDE, POTASSIUM CHLORIDE, SODIUM LACTATE AND CALCIUM CHLORIDE: 600; 310; 30; 20 INJECTION, SOLUTION INTRAVENOUS at 13:30

## 2019-04-29 RX ADMIN — NICARDIPINE HYDROCHLORIDE 5 MG/HR: 0.1 INJECTION, SOLUTION INTRAVENOUS at 20:11

## 2019-04-29 RX ADMIN — HEPARIN SODIUM 6900 UNITS: 1000 INJECTION INTRAVENOUS; SUBCUTANEOUS at 14:48

## 2019-04-29 RX ADMIN — SODIUM CHLORIDE, POTASSIUM CHLORIDE, SODIUM LACTATE AND CALCIUM CHLORIDE: 600; 310; 30; 20 INJECTION, SOLUTION INTRAVENOUS at 13:11

## 2019-04-29 RX ADMIN — MUPIROCIN: 20 OINTMENT TOPICAL at 11:17

## 2019-04-29 RX ADMIN — FENTANYL CITRATE 25 MCG: 50 INJECTION INTRAMUSCULAR; INTRAVENOUS at 14:39

## 2019-04-29 RX ADMIN — METOPROLOL TARTRATE 1 MG: 1 INJECTION, SOLUTION INTRAVENOUS at 15:23

## 2019-04-29 RX ADMIN — SODIUM CHLORIDE, POTASSIUM CHLORIDE, SODIUM LACTATE AND CALCIUM CHLORIDE: 600; 310; 30; 20 INJECTION, SOLUTION INTRAVENOUS at 14:07

## 2019-04-29 RX ADMIN — METOPROLOL TARTRATE 1 MG: 1 INJECTION, SOLUTION INTRAVENOUS at 15:19

## 2019-04-29 RX ADMIN — PROTAMINE SULFATE 10 MG: 10 INJECTION, SOLUTION INTRAVENOUS at 15:19

## 2019-04-29 RX ADMIN — IODIXANOL 45 ML: 270 INJECTION, SOLUTION INTRAVASCULAR at 15:43

## 2019-04-29 RX ADMIN — BISACODYL 10 MG: 10 SUPPOSITORY RECTAL at 18:54

## 2019-04-29 RX ADMIN — FENTANYL CITRATE 25 MCG: 50 INJECTION INTRAMUSCULAR; INTRAVENOUS at 14:50

## 2019-04-29 RX ADMIN — SENNOSIDES 17.2 MG: 8.6 TABLET, FILM COATED ORAL at 18:11

## 2019-04-29 RX ADMIN — FENTANYL CITRATE 25 MCG: 50 INJECTION INTRAMUSCULAR; INTRAVENOUS at 13:32

## 2019-04-29 RX ADMIN — DESMOPRESSIN ACETATE 40 MG: 0.2 TABLET ORAL at 20:10

## 2019-04-29 RX ADMIN — NICARDIPINE HYDROCHLORIDE 7.5 MG/HR: 0.1 INJECTION, SOLUTION INTRAVENOUS at 22:20

## 2019-04-29 RX ADMIN — CLOPIDOGREL 75 MG: 75 TABLET, FILM COATED ORAL at 08:52

## 2019-04-29 RX ADMIN — NICARDIPINE HYDROCHLORIDE 2.5 MG/HR: 0.1 INJECTION, SOLUTION INTRAVENOUS at 15:29

## 2019-04-29 ASSESSMENT — PULMONARY FUNCTION TESTS
PIF_VALUE: 0
PIF_VALUE: 1
PIF_VALUE: 0
PIF_VALUE: 1
PIF_VALUE: 0
PIF_VALUE: 1
PIF_VALUE: 1
PIF_VALUE: 0
PIF_VALUE: 1
PIF_VALUE: 0
PIF_VALUE: 0
PIF_VALUE: 1
PIF_VALUE: 0

## 2019-04-29 ASSESSMENT — PAIN - FUNCTIONAL ASSESSMENT: PAIN_FUNCTIONAL_ASSESSMENT: 0-10

## 2019-04-29 ASSESSMENT — PAIN SCALES - GENERAL
PAINLEVEL_OUTOF10: 0
PAINLEVEL_OUTOF10: 7

## 2019-04-29 NOTE — ANESTHESIA POSTPROCEDURE EVALUATION
Department of Anesthesiology  Postprocedure Note    Patient: Deepika Groves  MRN: [id number]  YOB: 1931  Date of evaluation: 2019  Time:  6:15 PM     Procedure Summary     Date:  19 Room / Location:  [facility] Special Procedures    Anesthesia Start:  1330 Anesthesia Stop:  4885    Procedure:  IR ANGIOGRAM CAROTID CEREBRAL BILATERAL Diagnosis:  ([address] with right carotid stent, with anesthesia assistance.)    Scheduled Providers:   Responsible Provider:  Devin Ivey MD    Anesthesia Type:  MAC ASA Status:  4          Anesthesia Type: MAC    Edie Phase I:      Edie Phase II:      Last vitals: Reviewed and per EMR flowsheets.        Anesthesia Post Evaluation   Vital Signs (Current)   Vitals:    19 1733   BP: 83/70   Pulse: 88   Resp: 22   Temp:    SpO2:      Vital Signs Statistics (for past 48 hrs)     Temp  Av.2 °F (36.8 °C)  Min: 97 °F (36.1 °C)   Min taken time: 19 1128  Max: 98.6 °F (37 °C)   Max taken time: 19 0412  Pulse  Av.3  Min: 79   Min taken time: 19 1003  Max: 112   Max taken time: 19 0137  Resp  Av.4  Min: 0   Min taken time: 19 1541  Max: 25   Max taken time: 19 1703  BP  Min: 83/70   Min taken time: 19 1733  Max: 208/86   Max taken time: 19 0007  ABP (Arterial line BP)  Min: 110/44   Min taken time: 19 1633  Max: 235/87   Max taken time: 19 1352  SpO2  Av.4 %  Min: 94 %   Min taken time: 19 1353  Max: 100 %   Max taken time: 19 1507    BP Readings from Last 3 Encounters:   19 83/70   19 (!) 162/72   19 (!) 171/70             Level of Consciousness:  Awake    Respiratory:  Stable    Airway :   Patent    Oxygen Saturation:  Stable    Cardiovascular:  Stable    Hydration:  Adequate    PONV:  Stable    Post-op Pain:  Adequate analgesia    Post-op Assessment:  No apparent anesthetic complications    Additional Follow-Up / Treatment / Comment:  None

## 2019-04-29 NOTE — PLAN OF CARE
Patient's rights, dignity, safety, and comfort maintained. Patient remained alert, oriented, calm and cooperative. Restraints discontinued. Physiological integrity of all systems maintained. Skin assessed for breakdown and redness, patient turned regularly, and heels elevated off of bed on pillows. Fall assessment preformed. Bed in low locked position with call light and tray table within reach. Education given. Will continue to monitor.

## 2019-04-29 NOTE — PROGRESS NOTES
Daily Progress Note  Neuro Critical Care    Patient Name: Hardeep Sin  Patient : 3/12/1931  Room/Bed: 37/7447-55  Allergies: Allergies   Allergen Reactions    Bactrim [Sulfamethoxazole-Trimethoprim]      Unknown reaction    Other      baset    Sulfa Antibiotics Other (See Comments)     psoriasis    Sulfur      Causes pt to break out psorsias    Versed [Midazolam] Other (See Comments)     Extreme confusion and agitation    Pcn [Penicillins] Hives     Also causes pt's throat to swell a little. Problem List:   Patient Active Problem List   Diagnosis    Lumbosacral spondylosis without myelopathy    Degeneration of lumbar or lumbosacral intervertebral disc    Spinal stenosis, lumbar region, with neurogenic claudication    Lumbar spondylolysis    Postural kyphosis of lumbar region    Arthritis of right hip    Right hip pain    Weight loss    Right lower quadrant abdominal pain    Anemia    Dizziness    Internal carotid artery stenosis, right    Received intravenous tissue plasminogen activator (t-PA) in emergency department    Ischemic stroke ([facility] [address]    Tissue plasminogen activator (t-PA) administered at other facility within 24 hours prior to current admission    Hypoglycemia    History of diabetes mellitus           :   Hospital Course:   79 y/o who came via mobile stroke unit to [address] [place] [address] due to acute onset of  AMS, dysarthria and left side hemiparesis at 4:30 pm with [facility]  Of 3:30 pm. Patient wife found him in living room mumbling words at 4:30 and he checked his BS it was 120 and called EMS who enroute checked BS and it was 37 at one time and he received  D50 x1 which improved his mental status but on arrival to [facility] he was drowsy and difficulty to arouse and appeared confussed however his left side weakness had resolved, he had Ct head done which showed ischemic changed and ctah/n showed 90% stenosis of L ICA. Patietn received t-PA.     LWKN : 3:30 pm  NIHSS: 4  A SpO2: 96 %  24HR Pulse Ox Range: SpO2  Av.5 %  Min: 96 %  Max: 98 %  Patient Vitals for the past 12 hrs:   Temp Temp src Pulse Resp SpO2   19 0800 -- -- 97 -- --   19 0500 98.2 °F (36.8 °C) Oral 95 24 96 %     Estimated body mass index is 27.21 kg/m² as calculated from the following:    Height as of this encounter: 6' 3\" (1.905 m). Weight as of this encounter: 217 lb 11.2 oz (98.7 kg).  []<16 Severe malnutrition  []16-16.99 Moderate malnutrition  []17-18.49 Mild malnutrition  [x]18.5-24.9 Normal  []25-29.9 Overweight (not obese)  []30-34.9 Obese class 1 (Low Risk)  []35-39.9 Obese class 2 (Moderate Risk)  []?40 Obese class 3 (High Risk)    RECENT LABS:   Lab Results   Component Value Date    WBC 10.1 2019    HGB 12.8 (L) 2019    HCT 39.8 (L) 2019     2019    CHOL 139 2019    TRIG 104 2019    HDL 55 2019    ALT 13 2019    AST 21 2019     2019    K 4.4 2019     2019    CREATININE 1.16 2019    BUN 12 2019    CO2 21 2019    TSH 5.92 (H) 2019    PSA 0.04 2019    INR 1.0 2019    LABA1C 6.4 (H) 2019     24 HOUR INTAKE/OUTPUT:    Intake/Output Summary (Last 24 hours) at 2019 0826  Last data filed at 2019 0140  Gross per 24 hour   Intake 625 ml   Output 1750 ml   Net -1125 ml       RADIOLOGY:   Xr Chest Standard (2 Vw)    Result Date: 2019  EXAMINATION: TWO VIEWS OF THE CHEST 2019 4:51 pm COMPARISON: Chest x-ray 2018 HISTORY: ORDERING SYSTEM PROVIDED HISTORY: Chronic obstructive pulmonary disease, unspecified COPD type ([address] TECHNOLOGIST PROVIDED HISTORY: Ordering Physician Provided Reason for Exam: Shortness of breath Acuity: Acute Type of Exam: Initial Relevant Medical/Surgical History: COPD FINDINGS: Chronic lung changes are present. No new parenchymal opacity. Costophrenic angles are clear. Cardiac and mediastinal structures are unchanged. The bones are unchanged. Chronic lung changes. No acute abnormality. Xr Abdomen (kub) (single Ap View)    Result Date: 4/27/2019  EXAMINATION: SINGLE XRAY VIEW OF THE CHEST; SINGLE SUPINE XRAY VIEW(S) OF THE ABDOMEN 4/27/2019 8:06 pm COMPARISON: Earlier today. HISTORY: ORDERING SYSTEM PROVIDED HISTORY: Lung sounds crackles TECHNOLOGIST PROVIDED HISTORY: Lung sounds crackles Ordering Physician Provided Reason for Exam: lung sounds crackles; ORDERING SYSTEM PROVIDED HISTORY: MRI clearance TECHNOLOGIST PROVIDED HISTORY: MRI clearance Ordering Physician Provided Reason for Exam: MRI clearance FINDINGS: Chest: Cardiac and mediastinal contours are stable. There is stable chronic reticular opacities in the lungs without consolidation or edema. No pneumothorax or pleural effusion is seen. No acute osseous abnormality is identified. There is no radiopaque foreign body. Abdomen: Nonspecific, nonobstructive bowel gas pattern. Contrast in the urinary bladder. No pathologic calcification. L4 through S1 posterior lumbar fusion hardware and right hip arthroplasty hardware are noted. No radiopaque foreign body is seen otherwise. 1. No acute cardiopulmonary abnormality. 2. No evidence of bowel obstruction. Ct Head Wo Contrast    Result Date: 4/27/2019  EXAMINATION: CT OF THE HEAD WITHOUT CONTRAST  4/27/2019 6:41 am TECHNIQUE: CT of the head was performed without the administration of intravenous contrast. Dose modulation, iterative reconstruction, and/or weight based adjustment of the mA/kV was utilized to reduce the radiation dose to as low as reasonably achievable.  COMPARISON: 04/26/2019 HISTORY: ORDERING SYSTEM PROVIDED HISTORY: 12 hours s/p tPA administration TECHNOLOGIST PROVIDED HISTORY: Ordering Physician Provided Reason for Exam: 12 hours s/p tPA administration Acuity: Acute Type of Exam: Subsequent/Follow-up FINDINGS: BRAIN/VENTRICLES: There is prominence of the ventricles and cortical sulci consistent with cortical volume loss. No midline shift. Basal cisterns are normally outlined. There is no clear evidence for acute infarct. No acute intra or extra-axial hemorrhage. Internal carotid artery calcifications noted. ORBITS: The visualized portion of the orbits demonstrate no acute abnormality. SINUSES: The visualized paranasal sinuses and mastoid air cells demonstrate no acute abnormality. SOFT TISSUES/SKULL:  No acute abnormality of the visualized skull or soft tissues. 1. No acute intracranial hemorrhage. 2. Cerebral atrophy and chronic small vessel ischemic changes in the white matter. Ct Head Wo Contrast    Result Date: 4/26/2019  EXAMINATION: CT OF THE HEAD WITHOUT CONTRAST  4/26/2019 5:25 pm TECHNIQUE: CT of the head was performed without the administration of intravenous contrast. Dose modulation, iterative reconstruction, and/or weight based adjustment of the mA/kV was utilized to reduce the radiation dose to as low as reasonably achievable. COMPARISON: August 4, 2018 HISTORY: ORDERING SYSTEM PROVIDED HISTORY: STROKE TECHNOLOGIST PROVIDED HISTORY: FINDINGS: BRAIN/VENTRICLES: Ventricles sulci are stable. Artifact limits the posterior fossa. There is no mass effect on the 4th ventricle. Small calcifications in the posterior fossa are again noted. Multifocal low-density in the periventricular and subcortical white matter is noted bilaterally. Findings are slightly greater in the right frontal region extending posteriorly to the frontal parietal junction. There may be subtle extension to the right frontal cortex. No hyperdense arteries are noted. Vascular calcifications are noted. There is no hemorrhage ORBITS: The visualized portion of the orbits demonstrate no acute abnormality. SINUSES: The visualized paranasal sinuses and mastoid air cells demonstrate no acute abnormality. SOFT TISSUES/SKULL:  No acute abnormality of the visualized skull or soft tissues.      Limitation due to artifact Multifocal small-vessel ischemic change bilaterally. Focal asymmetric low density in the right frontal subcortical white matter with questionable extension of the cortex. This was present on the prior. Subtle increase would be difficult to exclude. If stroke symptoms continue consider MRI. Critical results were called by Dr. Nelli Ferrer to [place] on 4/26/2019 at 17:41. Xr Chest Portable    Result Date: 4/27/2019  EXAMINATION: SINGLE XRAY VIEW OF THE CHEST; SINGLE SUPINE XRAY VIEW(S) OF THE ABDOMEN 4/27/2019 8:06 pm COMPARISON: Earlier today. HISTORY: ORDERING SYSTEM PROVIDED HISTORY: Lung sounds crackles TECHNOLOGIST PROVIDED HISTORY: Lung sounds crackles Ordering Physician Provided Reason for Exam: lung sounds crackles; ORDERING SYSTEM PROVIDED HISTORY: MRI clearance TECHNOLOGIST PROVIDED HISTORY: MRI clearance Ordering Physician Provided Reason for Exam: MRI clearance FINDINGS: Chest: Cardiac and mediastinal contours are stable. There is stable chronic reticular opacities in the lungs without consolidation or edema. No pneumothorax or pleural effusion is seen. No acute osseous abnormality is identified. There is no radiopaque foreign body. Abdomen: Nonspecific, nonobstructive bowel gas pattern. Contrast in the urinary bladder. No pathologic calcification. L4 through S1 posterior lumbar fusion hardware and right hip arthroplasty hardware are noted. No radiopaque foreign body is seen otherwise. 1. No acute cardiopulmonary abnormality. 2. No evidence of bowel obstruction. Xr Chest Portable    Result Date: 4/27/2019  EXAMINATION: SINGLE XRAY VIEW OF THE CHEST 4/27/2019 9:40 am COMPARISON: 04/18/2019 HISTORY: ORDERING SYSTEM PROVIDED HISTORY: eval lungs TECHNOLOGIST PROVIDED HISTORY: eval lungs FINDINGS: The heart and mediastinal structures are stable. Increased interstitial markings are present compatible with chronic interstitial lung disease no new pulmonary process is seen. The appearance of lungs are similar to the study from 07/06/2018. Stable chest chronic interstitial lung disease. Cta Neck W Contrast    Result Date: 4/27/2019  EXAMINATION: CTA OF THE NECK; CTA OF THE HEAD WITH CONTRAST 4/27/2019 12:07 am: TECHNIQUE: CTA of the neck was performed with the administration of intravenous contrast. Multiplanar reformatted images are provided for review. MIP images are provided for review. Stenosis of the internal carotid arteries measured using NASCET criteria. Dose modulation, iterative reconstruction, and/or weight based adjustment of the mA/kV was utilized to reduce the radiation dose to as low as reasonably achievable.; CTA of the head/brain was performed with the administration of intravenous contrast. Multiplanar reformatted images are provided for review. MIP images are provided for review. Dose modulation, iterative reconstruction, and/or weight based adjustment of the mA/kV was utilized to reduce the radiation dose to as low as reasonably achievable. 3D reconstructed images were performed on a separate workstation and provided for review. COMPARISON: CT neck 07/08/2008 CT head 04/26/2019 HISTORY: ORDERING SYSTEM PROVIDED HISTORY: stroke like symptoms, [facility] 4 TECHNOLOGIST PROVIDED HISTORY: Ordering Physician Provided Reason for Exam: stroke-like symptoms Acuity: Acute Type of Exam: Unknown FINDINGS: CTA NECK: AORTIC ARCH/ARCH VESSELS: There is a normal branch pattern of the aortic arch. No significant stenosis is seen of the innominate artery or subclavian arteries. CAROTID ARTERIES: The common carotid arteries demonstrate mild atherosclerotic changes without evidence of a flow limiting stenosis. Heavy atherosclerotic plaque identified right carotid bifurcation extending into the right proximal ICA resulting in subtotal, greater than 90% stenosis right proximal ICA per NASCET criteria. .  The left internal carotid artery demonstrates mild plaque left carotid bifurcation. Normal in appearance without evidence of a flow limiting stenosis by NASCET criteria. No dissection or arterial injury is seen. VERTEBRAL ARTERIES: The vertebral arteries both arise from the subclavian arteries and are normal in caliber without evidence of flow limiting stenosis or dissection. SOFT TISSUES: The lung apices are clear. No cervical or superior mediastinal lymphadenopathy. The visualized portion of the larynx and pharynx appear unremarkable. The parotid, submandibular and thyroid glands demonstrate no acute abnormality. BONES: The visualized osseous structures demonstrate multilevel degenerative changes. There subpleural areas of interstitial thickening suggestive underlying airspace disease involving both lung apices. . CTA HEAD: ANTERIOR CIRCULATION: Moderate plaque identified involving the cavernous and supraclinoid ICAs resulting in up to 40-50% stenosis involving the proximal cavernous segments. The anterior cerebral and middle cerebral arteries demonstrate no focal stenosis. POSTERIOR CIRCULATION: The posterior cerebral arteries demonstrate no focal stenosis. The vertebral and basilar arteries appear unremarkable. BRAIN: No mass effect or midline shift. No abnormal extra-axial fluid collection. The gray-white differentiation appears grossly maintained. 90% or greater subtotal stenosis identified right proximal ICA level carotid bifurcation. No hemodynamic stenosis or occlusion identified involving intracranial arterial circulation     Cta Head W Contrast    Result Date: 4/27/2019  EXAMINATION: CTA OF THE NECK; CTA OF THE HEAD WITH CONTRAST 4/27/2019 12:07 am: TECHNIQUE: CTA of the neck was performed with the administration of intravenous contrast. Multiplanar reformatted images are provided for review. MIP images are provided for review. Stenosis of the internal carotid arteries measured using NASCET criteria.  Dose modulation, iterative reconstruction, and/or weight based adjustment of the mA/kV was utilized to reduce the radiation dose to as low as reasonably achievable.; CTA of the head/brain was performed with the administration of intravenous contrast. Multiplanar reformatted images are provided for review. MIP images are provided for review. Dose modulation, iterative reconstruction, and/or weight based adjustment of the mA/kV was utilized to reduce the radiation dose to as low as reasonably achievable. 3D reconstructed images were performed on a separate workstation and provided for review. COMPARISON: CT neck 07/08/2008 CT head 04/26/2019 HISTORY: ORDERING SYSTEM PROVIDED HISTORY: stroke like symptoms, NIH 4 TECHNOLOGIST PROVIDED HISTORY: Ordering Physician Provided Reason for Exam: stroke-like symptoms Acuity: Acute Type of Exam: Unknown FINDINGS: CTA NECK: AORTIC ARCH/ARCH VESSELS: There is a normal branch pattern of the aortic arch. No significant stenosis is seen of the innominate artery or subclavian arteries. CAROTID ARTERIES: The common carotid arteries demonstrate mild atherosclerotic changes without evidence of a flow limiting stenosis. Heavy atherosclerotic plaque identified right carotid bifurcation extending into the right proximal ICA resulting in subtotal, greater than 90% stenosis right proximal ICA per NASCET criteria. .  The left internal carotid artery demonstrates mild plaque left carotid bifurcation. Normal in appearance without evidence of a flow limiting stenosis by NASCET criteria. No dissection or arterial injury is seen. VERTEBRAL ARTERIES: The vertebral arteries both arise from the subclavian arteries and are normal in caliber without evidence of flow limiting stenosis or dissection. SOFT TISSUES: The lung apices are clear. No cervical or superior mediastinal lymphadenopathy. The visualized portion of the larynx and pharynx appear unremarkable. The parotid, submandibular and thyroid glands demonstrate no acute abnormality.  BONES: The visualized osseous structures demonstrate multilevel degenerative changes. There subpleural areas of interstitial thickening suggestive underlying airspace disease involving both lung apices. . CTA HEAD: ANTERIOR CIRCULATION: Moderate plaque identified involving the cavernous and supraclinoid ICAs resulting in up to 40-50% stenosis involving the proximal cavernous segments. The anterior cerebral and middle cerebral arteries demonstrate no focal stenosis. POSTERIOR CIRCULATION: The posterior cerebral arteries demonstrate no focal stenosis. The vertebral and basilar arteries appear unremarkable. BRAIN: No mass effect or midline shift. No abnormal extra-axial fluid collection. The gray-white differentiation appears grossly maintained. 90% or greater subtotal stenosis identified right proximal ICA level carotid bifurcation. No hemodynamic stenosis or occlusion identified involving intracranial arterial circulation     Mri Brain W Wo Contrast    Result Date: 4/27/2019  EXAMINATION: MRI OF THE BRAIN WITHOUT AND WITH CONTRAST  4/27/2019 11:14 pm TECHNIQUE: Multiplanar multisequence MRI of the head/brain was performed without and with the administration of intravenous contrast. COMPARISON: None. HISTORY: ORDERING SYSTEM PROVIDED HISTORY: post TPA, stroke symptoms w 90% L ICA stenosis. FINDINGS: Mild-to-moderate motion degradation challenge evaluation. INTRACRANIAL STRUCTURES/VENTRICLES:  No evidence of restricted diffusion to suggest acute territory infarction. Moderate generalized involutional change brain parenchyma identified with prominence of ventricles and sulci. Moderate periventricular and subcortical T2 prolongation identified suggestive chronic small vessel ischemic disease. Minimal gliosis identified along the right hand knob along the right precentral gyrus. A few scattered chronic lacune infarcts identified involving both frontal and parietal centrum semiovale.   Major intracranial vascular flow voids are maintained. Subtle region grossly unremarkable. No hemorrhagic transformation status post tPA. ORBITS: The visualized portion of the orbits demonstrate no acute abnormality. SINUSES: Mild-to-moderate ethmoid sinus and maxillary sinus mucosal thickening. BONES/SOFT TISSUES: The bone marrow signal intensity appears normal. The soft tissues demonstrate no acute abnormality. Moderate chronic small ischemic disease and age related involutional change without acute stroke, midline shift or mass effect. No hemorrhagic transformation status post tPA. Labs and Images reviewed with:  [] Dr. Wilford Hodgkin. Sree    [] Dr. Dayron Bryant  [x] Dr. Morris Fleming  [] There are no new interval images to review. PHYSICAL EXAM              CONSTITUTIONAL:  Well developed, well nourished, Nontoxic.mild dysarthia   HEAD:  normocephalic, atraumatic    EYES:  PERRLA, EOMI.   ENT:  moist mucous membranes   NECK:  supple, symmetric   LUNGS:  Equal air entry bilaterally   CARDIOVASCULAR:  normal s1 / s2, RRR, distal pulses intact   ABDOMEN:  Soft, no rigidity   NEUROLOGIC:  Mental Status: AAAx3     Cranial Nerves:    cranial nerves II-XII are grossly intact     Motor Exam:    Drift:  absent  Tone:  normal     Motor exam is symmetrical 5 out of 5 all extremities bilaterally     Sensory:    Touch:    Right Upper Extremity:  normal  Left Upper Extremity:  normal  Right Lower Extremity:  normal  Left Lower Extremity:  normal     Deep Tendon Reflexes:    Right Bicep:  2+  Left Bicep:  2+  Right Knee:  2+  Left Knee:  2+     Plantar Response:  Right:  downgoing  Left:  downgoing     Clonus:  absent  Varela's:  absent     Coordination/Dysmetria:  Heel to Shin:  Right:  normal  Finger to Nose:   Right:  normal   Dysdiadochokinesia:  absent     Gait:  Not tested            DRAINS:  [] There are no drains for Neuro Critical Care to monitor at this time.      ASSESSMENT AND PLAN:       81 y/o  Had acute osnet of dysarthria and left sie weakness s/p t-PA at [facility] at 7 pn 4/26 and transferred        NEUROLOGIC:  - Ct head showed ischemic changes, CTaH/N showed 90% of stenosis to L ICA   MRI 24 hr post t-Pa showed chronic small vessel changes  and no acute stroke or bleed. - as per endovascular pt will be going for R ICA stent this week after better glycmic control  - Goal SBP <180  -labetalol, hydralazine and Cardene prn  - will start ASA 81 mg and Plavix 75 mg  - geodon prn for agitation  -has b/l hand tremors x 6 months would recommend to start antiparkinson meds once he stable post stent and neurolgy f/u upon discharge  - Neuro checks per protocol     CARDIOVASCULAR:  - Goal SBP <180  - statin 40 mg  - Continue telemetry  -labetalol, hydralazine   - f/u echo     PULMONARY:  -Monitor saturation     RENAL/FLUID/ELECTROLYTE:  - BUN 18/ Creatinine 1.02  - IVF@ 75 cc/hr  - Replace electrolytes PRN  - Daily BMP     GI/NUTRITION:  NUTRITION:  - advance diet as toelrated  - Bowel regimen: MoM  - GI prophylaxis: Pepcid     ID:  -afebrile  - Continue to monitor for fevers  - Daily CBC     HEME:   - H&H 12.3/37.5  - Platelets 259  - Daily CBC     ENDOCRINE:  - Continue to monitor blood glucose, goal <180  - hypoglycemia protocol, IM on board for glycemic control     OTHER:  - PT/OT/ST   - Code Status: full           DVT PROPHYLAXIS:  - SCD sleeves - Thigh High   - EMMA stockings - Thigh High   - on ASA 81 mg and Plavix 75 mg        DISPOSITION:  [x] To remain ICU:  [] OK for out of ICU from Neuro Critical Care standpoint    We will continue to follow along. For any changes in exam or patient status please contact Neuro Critical Care.       Rodríguez Baez MD  Neuro Critical Care  Pager [phone]  4/29/2019     8:26 AM

## 2019-04-29 NOTE — PROGRESS NOTES
Smoking Cessation - topics covered   []  Health Risks  []  Benefits of Quitting   []  Smoking Cessation  []  Patient has no history of tobacco use  [x]  Patient is former smoker. [x]  No need for tobacco cessation education. []  Booklet given  []  Patient verbalizes understanding. []  Patient denies need for tobacco cessation education. []  Unable to meet with patient today. Will follow up as able.   Abdulkadir Garcia  8:44 AM

## 2019-04-29 NOTE — PLAN OF CARE
Problem: Risk for Impaired Skin Integrity  Goal: Tissue integrity - skin and mucous membranes  Description  Structural intactness and normal physiological function of skin and  mucous membranes.   4/29/2019 1824 by Yue Gold RN  Outcome: Ongoing  4/29/2019 0650 by Lor Matos RN  Outcome: Ongoing     Problem: Falls - Risk of:  Goal: Will remain free from falls  Description  Will remain free from falls  4/29/2019 1824 by Yue Gold RN  Outcome: Ongoing  4/29/2019 0650 by Lor Matos RN  Outcome: Ongoing  Goal: Absence of physical injury  Description  Absence of physical injury  4/29/2019 1824 by Yue Gold RN  Outcome: Ongoing  4/29/2019 0650 by Lor Matos RN  Outcome: Ongoing     Problem: HEMODYNAMIC STATUS  Goal: Patient has stable vital signs and fluid balance  4/29/2019 1824 by Yue Gold RN  Outcome: Ongoing  4/29/2019 0650 by Lor Matos RN  Outcome: Ongoing     Problem: ACTIVITY INTOLERANCE/IMPAIRED MOBILITY  Goal: Mobility/activity is maintained at optimum level for patient  4/29/2019 1824 by Yue Gold RN  Outcome: Ongoing  4/29/2019 0650 by Lor Matos RN  Outcome: Ongoing     Problem: COMMUNICATION IMPAIRMENT  Goal: Ability to express needs and understand communication  4/29/2019 1824 by Yue Gold RN  Outcome: Ongoing  4/29/2019 0650 by Lor Matos RN  Outcome: Ongoing

## 2019-04-29 NOTE — OP NOTE
[facility]    NEUROENDOVASCULAR SERVICE: POST-OP NOTE: 4/29/2019    Pt Name: Aman Andrade  MRN: [id number]  YOB: 1931  Date of Procedure: 4/29/2019  Primary Care Physician: Verneita Schwab, DO    Pre-Procedural Diagnosis:right ICA stenosis  Post-Procedural Diagnosis:same    Procedure Performed:Conventional cerebral angiogram with right ICA stenting while using a distal embolic protection device. Surgeon:   Leonel Peguero MD    1st Assistant:  Eduar Rodarte    Fellow:  Ulices White    PRE-PROCEDURAL EXAM:  MODIFIED JEFF SCORE: 2 - Slight disability:  unable to carry out all previous activities, but able to look after own affairs without assistance. Neurological exam performed and unchanged from initial H&P or consult    Anesthesia: MAC anesthesia  Complications: none    Intra-Operative EXAM:  Patient sedated with unchanged limited neurological exam    EBL: < Minimal      Cc            Specimens: Were not Obtained  Contrast:     Visipaque 270 low osmolar 45 Cc             Fluoro: 14.5 min    Findings:  Please see dictated Radiology note for further details  1. Right cervical ICA 90% stenosis per NASCET criteria treated with pre and post balloon angioplasty and stenting using a 10 x 31 mm Wall stent with < 10% residual stenosis      POST-PROCEDURAL EXAM :   Stable neurological Exam  Neurological exam performed and unchanged from initial H&P or consult    Closure:  right Angioseal 6   F        POST-PROCEDURAL MONITORING : see orders  Disposition: Neuro ICU      Recommendations:  1. Back to NSICU. 2. Do not bend right leg for 3 hours. 3. Groin checks per protocol. 4. Neuro checks per icu protocol. 5. Peripheral pulse checks per protocol. 6. ICU management per NSICU team.   7. SBP goal   8. Upon discharge, patient to follow up with Dr Agus Garcias in 1-2 weeks and with Dr Jl Amin in 3 months.   9. Continue aspirin and plavix    Kathleen Minna  Stroke, Neurocritical Care & [facility]  [address]  Electronically signed 4/29/2019 at 7:24 PM

## 2019-04-29 NOTE — PROGRESS NOTES
Physical Therapy  DATE: 2019    NAME: Karlee Hand  MRN: [id number]   : 3/12/1931    Patient not seen this date for Physical Therapy due to:  [] Blood transfusion in progress  [] Hemodialysis  []  Patient Declined  [] Spine Precautions   [] Strict Bedrest  [x] Surgery/ Procedure- bilateral carotid stenting today. PT will check back 19. [] Testing      [] Other        [] PT being discontinued at this time. Patient independent. No further needs. [] PT being discontinued at this time as the patient has been transferred to palliative care. No further needs.     Kavya Tejeda, PT

## 2019-04-29 NOTE — PROGRESS NOTES
ENDOVASCULAR NEUROSURGERY PROGRESS NOTE  4/29/2019 8:56 AM  Subjective:   Admit Date: 4/27/2019  PCP: Daina Ferguson, DO    No events overnight. Exam stable. Objective:   Vitals: BP (!) 179/82   Pulse 97   Temp 98.4 °F (36.9 °C) (Oral)   Resp 21   Ht 6' 3\" (1.905 m)   Wt 217 lb 11.2 oz (98.7 kg)   SpO2 96%   BMI 27.21 kg/m²     General appearance: Lying in bed, NAD,  Lungs: CTAB  Heart: RRR  Abdomen: soft, NTND, bowel sounds normal    Neuro exam: Follows simple commands, disoriented. CN II-XII: no gross facial asymmetry, dysarthria, pupils 2 mm reactive to light bilaterally, EOMI, VFF. Juliocesar spontaneously against gravity. Tremors b/l        Medications and labs:   Scheduled Meds:   mupirocin   Topical Daily    QUEtiapine  25 mg Oral Nightly    sodium chloride flush  10 mL Intravenous 2 times per day    atorvastatin  40 mg Oral Nightly    aspirin  325 mg Oral Once    clopidogrel  300 mg Oral Once    aspirin  81 mg Oral Daily    clopidogrel  75 mg Oral Daily     Continuous Infusions:   dextrose 50 mL/hr at 04/28/19 1600    dextrose Stopped (04/27/19 1510)     CBC:   Recent Labs     04/27/19  0623 04/27/19  1140 04/28/19  0856   WBC 7.3 9.7 10.1   HGB 12.3* 12.4* 12.8*    259 266     BMP:    Recent Labs     04/27/19  0623 04/27/19  1140 04/28/19  0856    140 136   K 3.7 4.3 4.4    108* 104   CO2 20 23 21   BUN 18 14 12   CREATININE 1.02 1.09 1.16   GLUCOSE 153* 83 146*       Assessment and Recommendations:   80years old male with left sided weakness and dysarthria s/p tPA 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 MRI is neg for acute stroke. 1. Continue ASA 81 mg and Plavix 75 mg daily - s/p load  2. Continue lipitor 40 mg daily  3. TTE   4. Avoid symptomatic hypotension  5. Internal medicine consult for hypoglycemia clearance for right ICA stent  6. Can be moved to the floor  7. Plan for GEM stent today once cleared by medicine for hypoglycemia.      Sergio Gonzalez

## 2019-04-29 NOTE — ANESTHESIA PROCEDURE NOTES
Arterial Line:    An arterial line was placed using surface landmarks, in the OR for the following indication(s): continuous blood pressure monitoring and blood sampling needed. A 20 gauge (size), (length), Arrow (type) catheter was placed, Seldinger technique used, into the left radial artery, secured by tape and Tegaderm. Anesthesia type: Local  Local infiltration: Topical    Events:  patient tolerated procedure well with no complications.   Anesthesiologist: Negar Still MD  Resident/CRNA: JOSE Thompson CRNA  Performed: Resident/CRNA   Preanesthetic Checklist  Completed: patient identified, site marked, surgical consent, pre-op evaluation, timeout performed, IV checked, risks and benefits discussed, monitors and equipment checked, anesthesia consent given, oxygen available and patient being monitored

## 2019-04-29 NOTE — ANESTHESIA PRE PROCEDURE
Department of Anesthesiology  Preprocedure Note       Name:  Shauna Arrieta   Age:  80 y.o.  :  3/12/1931                                          MRN:  [id number]         Date:  2019      Surgeon: * No surgeons listed *    Procedure: IR ANGIOGRAM CAROTID CEREBRAL BILATERAL    Medications prior to admission:   Prior to Admission medications    Medication Sig Start Date End Date Taking? Authorizing Provider   clopidogrel (PLAVIX) 75 MG tablet  19   Historical Provider, MD   fluticasone (FLONASE) 50 MCG/ACT nasal spray 2 sprays by Nasal route daily 2 SPRAYS IN EACH NOSTRIL. 18  Adrien Walker DO   montelukast (SINGULAIR) 10 MG tablet  8/15/17   Historical Provider, MD   metoprolol tartrate (LOPRESSOR) 50 MG tablet Take 50 mg by mouth daily  17   Historical Provider, MD   omeprazole (PRILOSEC) 20 MG delayed release capsule Take 20 mg by mouth Daily Indications: ([facility] Mail order)     Historical Provider, MD   budesonide-formoterol (SYMBICORT) 160-4.5 MCG/ACT AERO Inhale 1 puff into the lungs 2 times daily Indications: ([facility] Mail order)     Historical Provider, MD   LANTUS SOLOSTAR 100 UNIT/ML injection pen Inject 60 Units into the skin daily  Patient taking differently: Inject 55 Units into the skin daily  16   Roselyn Ford MD   BD ULTRA-FINE PEN NEEDLES 29G X 12.7MM MISC Inject 1 each into the skin daily  16   Historical Provider, MD   loperamide (IMODIUM) 2 MG capsule Take 2 mg by mouth 4 times daily as needed.       Historical Provider, MD       Current medications:    Current Facility-Administered Medications   Medication Dose Route Frequency Provider Last Rate Last Dose    mupirocin (BACTROBAN) 2 % ointment   Topical Daily Amrik Arthur MD        dextrose 5 % and 0.45 % sodium chloride infusion   Intravenous Continuous Virginie Nagel MD 75 mL/hr at 19 1048      hydrALAZINE (APRESOLINE) injection 10 mg  10 mg Intravenous Q4H PRN Aldo Scheuermann MD Ulisses   10 mg at 04/28/19 0010    ziprasidone (GEODON) injection 5 mg  5 mg Intramuscular Q6H PRN Jayde Milian MD        And    sterile water injection 1.2 mL  1.2 mL Intramuscular Q6H PRN Jayde Milian MD        QUEtiapine (SEROQUEL) tablet 25 mg  25 mg Oral Nightly Lesly Turner MD   25 mg at 04/28/19 2115    sodium chloride flush 0.9 % injection 10 mL  10 mL Intravenous 2 times per day Jayde Milian MD   10 mL at 04/29/19 0854    sodium chloride flush 0.9 % injection 10 mL  10 mL Intravenous PRN Jayde Milian MD        magnesium hydroxide (MILK OF MAGNESIA) 400 MG/5ML suspension 30 mL  30 mL Oral Daily PRN Jayde Milian MD        ondansetron (ZOFRAN) injection 4 mg  4 mg Intravenous Q6H PRN Jayde Milian MD        glucose (GLUTOSE) 40 % oral gel 15 g  15 g Oral PRN Jayde Milian MD        dextrose 50 % solution 12.5 g  12.5 g Intravenous PRN Jayde Milian MD   12.5 g at 04/27/19 0929    glucagon (rDNA) injection 1 mg  1 mg Intramuscular PRN Jayde Milian MD        dextrose 5 % solution  100 mL/hr Intravenous PRN Jayde Milian MD   Stopped at 04/27/19 1510    atorvastatin (LIPITOR) tablet 40 mg  40 mg Oral Nightly Jayde Milian MD   40 mg at 04/28/19 2115    aspirin EC tablet 325 mg  325 mg Oral Once Senthil Rivera MD   Stopped at 04/27/19 1612    clopidogrel (PLAVIX) tablet 300 mg  300 mg Oral Once Senthil Rivera MD   Stopped at 04/27/19 1614    aspirin EC tablet 81 mg  81 mg Oral Daily Senthil Rivera MD   81 mg at 04/29/19 0852    clopidogrel (PLAVIX) tablet 75 mg  75 mg Oral Daily Senthil Rivera MD   75 mg at 04/29/19 0852    sodium chloride flush 0.9 % injection 10 mL  10 mL Intravenous PRN Dariln Dunaway MD           Allergies:     Allergies   Allergen Reactions    Bactrim [Sulfamethoxazole-Trimethoprim]      Unknown reaction    Other      baset    Sulfa Antibiotics Other (See Comments)     psoriasis    Sulfur      Causes pt to break out psorsias    Versed 1.13 04/29/2019    GFRAA >60 04/29/2019    LABGLOM >60 04/29/2019    GLUCOSE 187 04/29/2019    GLUCOSE 145 04/22/2012    PROT 7.6 04/18/2019    CALCIUM 9.2 04/29/2019    BILITOT 0.77 04/18/2019    ALKPHOS 55 04/18/2019    AST 21 04/18/2019    ALT 13 04/18/2019       POC Tests:   Recent Labs     04/29/19  1130   POCGLU 181*       Coags:   Lab Results   Component Value Date    PROTIME 12.9 04/26/2019    PROTIME 10.1 11/24/2011    INR 1.0 04/26/2019    APTT 30.2 04/26/2019       HCG (If Applicable): No results found for: PREGTESTUR, PREGSERUM, HCG, HCGQUANT     ABGs: No results found for: PHART, PO2ART, KAY8TXE, ZAR4QQK, BEART, T3PYOKAR     Type & Screen (If Applicable):  No results found for: LABABO, [address]    Anesthesia Evaluation  Patient summary reviewed no history of anesthetic complications:   Airway: Mallampati: III  TM distance: >3 FB   Neck ROM: full  Mouth opening: > = 3 FB Dental:          Pulmonary:normal exam    (+) asthma:                            Cardiovascular:          ECG reviewed      Echocardiogram reviewed                  Neuro/Psych:   (+) CVA: residual symptoms,             GI/Hepatic/Renal:   (+) GERD:,           Endo/Other:    (+) DiabetesType II DM, well controlled, , .                 Abdominal:           Vascular:                                        Anesthesia Plan      MAC     ASA 4       Induction: intravenous. arterial line  MIPS: Postoperative opioids intended. Anesthetic plan and risks discussed with patient, spouse and healthcare power of . Plan discussed with CRNA. Zahraa Franco MD   4/29/2019    Left ventricle is normal in size and wall thickness. Global left ventricular systolic function is normal Estimated ejection  fraction is 65 % . No obvious wall motion abnormality seen. Evidence of diastolic dysfunction. Aortic valve is trileaflet. Aortic leaflets show calcification without restriction of motion.   Mitral annular calcification is seen.  Trivial mitral regurgitation. Mild tricuspid regurgitation. Estimated right ventricular systolic pressure is 40 mmHg. Mild pulmonary hypertension. Trivial pulmonic insufficiency. No significant pericardial effusion is seen. Normal aortic root dimension. No shunt noted by color doppler.

## 2019-04-29 NOTE — PROGRESS NOTES
Patient remains alert, oriented, calm and cooperative through out the night. Bedside sitter discontinued. Will continue to monitor.

## 2019-04-29 NOTE — PROGRESS NOTES
[address]  Progress Note             Date:   4/29/2019  Patient name:  Roxane Tejada  Date of admission:  4/27/2019  8:45 AM  MRN:   [id number]  YOB: 1931    CHIEF COMPLAINT     Hypoglycemia    HISTORY OF PRESENT ILLNESS      The patient is a 80 y.o.  male with past medical history of diabetes, COPD who is admitted to the hospital when he was transferred from [facility] due to acute onset of altered mental status, dysarthria and left-sided hemiparesis. He received TPA at [facility]. CT head showed cerebral atrophy and small vessel ischemic changes. MRI brain done showed small ischemic disease and age-related mentation change. CTA head and neck showed 90% artery stenosis of right proximal ICA level carotid bifurcation. Discussed with the wife, she states that the patient's blood sugar was running in 120s at home. The patient had a hypoglycemic episode overnight, and surgery was held today. Recent adjustment in his insulin Lantus were made by the patient's PCP.      04/29/19 Current evaluation -   Patient hemodynamically stable   Blood sugars controlled   strength improved on the left side     PAST MEDICAL HISTORY       has a past medical history of Arthritis, Asthma, Cystitis, Diabetic neuropathy ([address] Diarrhea, Dizziness, GERD (gastroesophageal reflux disease), H/O acute bronchitis, [place] (hard of hearing), Hx-TIA (transient ischemic attack), Hypertension, Kyphosis of thoracolumbar region, Neurogenic claudication, Numbness and tingling, Onychomycosis, Prolonged emergence from general anesthesia, Spinal stenosis of lumbar region, Spondylolysis of lumbar region, Type II or unspecified type diabetes mellitus without mention of complication, not stated as uncontrolled, and Wears glasses. PAST SURGICAL HISTORY       has a past surgical history that includes Tonsillectomy; Cystocopy; Colonoscopy;  Total knee arthroplasty (Left); Inguinal hernia repair (Bilateral); Cataract removal with implant (Bilateral); Lumbar spine surgery; Revision total knee arthroplasty (Left); and Total hip arthroplasty (Right, 07/13/2016). HOME MEDICATIONS        Prior to Admission medications    Medication Sig Start Date End Date Taking? Authorizing Provider   clopidogrel (PLAVIX) 75 MG tablet  4/21/19   Historical Provider, MD   fluticasone (FLONASE) 50 MCG/ACT nasal spray 2 sprays by Nasal route daily 2 SPRAYS IN EACH NOSTRIL. 8/7/18 9/6/18  Adrien Walker DO   montelukast (SINGULAIR) 10 MG tablet  8/15/17   Historical Provider, MD   metoprolol tartrate (LOPRESSOR) 50 MG tablet Take 50 mg by mouth daily  5/6/17   Historical Provider, MD   omeprazole (PRILOSEC) 20 MG delayed release capsule Take 20 mg by mouth Daily Indications: ([facility] Mail order)     Historical Provider, MD   budesonide-formoterol (SYMBICORT) 160-4.5 MCG/ACT AERO Inhale 1 puff into the lungs 2 times daily Indications: ([facility] Mail order)     Historical Provider, MD   LANTUS SOLOSTAR 100 UNIT/ML injection pen Inject 60 Units into the skin daily  Patient taking differently: Inject 55 Units into the skin daily  7/16/16   Felipe Headley MD   BD ULTRA-FINE PEN NEEDLES 29G X 12.7MM MISC Inject 1 each into the skin daily  4/7/16   Historical Provider, MD   loperamide (IMODIUM) 2 MG capsule Take 2 mg by mouth 4 times daily as needed. Historical Provider, MD       ALLERGIES      Bactrim [sulfamethoxazole-trimethoprim]; Other; Sulfa antibiotics; Sulfur; Versed [midazolam]; and Pcn [penicillins]    SOCIAL HISTORY       reports that he has quit smoking. His smoking use included cigarettes. He quit smokeless tobacco use about 32 years ago. He reports that he does not drink alcohol or use drugs.      FAMILY HISTORY      family history includes Coronary Art Dis in his father; Emphysema in his mother; Heart Attack in his father; High Blood Pressure in his father. REVIEW OF SYSTEMS      Unable to be obtained because of patient's mentation. PHYSICAL EXAM      BP (!) 179/82   Pulse 97   Temp 98.4 °F (36.9 °C) (Oral)   Resp 21   Ht 6' 3\" (1.905 m)   Wt 217 lb 11.2 oz (98.7 kg)   SpO2 96%   BMI 27.21 kg/m²      · General appearance: well nourished  · HEENT: Head: Normocephalic, no lesions, without obvious abnormality. · Lungs: clear to auscultation bilaterally  · Heart: regular rate and rhythm, S1, S2 normal, no murmur, click, rub or gallop  · Abdomen: soft, non-tender; bowel sounds normal; no masses,  no organomegaly  · Extremities: extremities normal, atraumatic, no cyanosis or edema  · Neurological: Gait normal. Reflexes normal and symmetric. Sensation grossly normal  · Skin - no rash, no lump   · Eye no icterus no redness  · Psych-normal affect   · NEURO-no limb weakness  No facial droop  · Lymphatic system-no lymphadenopathy no splenomegaly     DIAGNOSTICS      Laboratory Testing:  CBC:   Recent Labs     04/28/19  0856   WBC 10.1   HGB 12.8*        BMP:    Recent Labs     04/27/19  0623 04/27/19  1140 04/28/19  0856    140 136   K 3.7 4.3 4.4    108* 104   CO2 20 23 21   BUN 18 14 12   CREATININE 1.02 1.09 1.16   GLUCOSE 153* 83 146*     S. Calcium:  Recent Labs     04/28/19  0856   CALCIUM 8.7     S. Ionized Calcium:No results for input(s): IONCA in the last 72 hours. S. Magnesium:No results for input(s): MG in the last 72 hours. S. Phosphorus:No results for input(s): PHOS in the last 72 hours. S. Glucose:  Recent Labs     04/28/19  2058 04/29/19  0502 04/29/19  0844   POCGLU 177* 148* 140*     Glycosylated hemoglobin A1C:   Recent Labs     04/27/19  1140   LABA1C 6.4*     INR:   Recent Labs     04/26/19  1834   INR 1.0     Hepatic functions: No results for input(s): ALKPHOS, ALT, AST, PROT, BILITOT, BILIDIR, LABALBU in the last 72 hours. Pancreatic functions:  Recent Labs     04/27/19  1140   LACTA NOT REPORTED     S.  Lactic

## 2019-04-30 LAB
ABSOLUTE EOS #: 0.69 K/UL (ref 0–0.44)
ABSOLUTE IMMATURE GRANULOCYTE: 0.03 K/UL (ref 0–0.3)
ABSOLUTE LYMPH #: 2.79 K/UL (ref 1.1–3.7)
ABSOLUTE MONO #: 1.06 K/UL (ref 0.1–1.2)
ANION GAP SERPL CALCULATED.3IONS-SCNC: 12 MMOL/L (ref 9–17)
BASOPHILS # BLD: 0 % (ref 0–2)
BASOPHILS ABSOLUTE: 0.04 K/UL (ref 0–0.2)
BUN BLDV-MCNC: 15 MG/DL (ref 8–23)
BUN/CREAT BLD: ABNORMAL (ref 9–20)
CALCIUM SERPL-MCNC: 8.6 MG/DL (ref 8.6–10.4)
CHLORIDE BLD-SCNC: 108 MMOL/L (ref 98–107)
CO2: 20 MMOL/L (ref 20–31)
CREAT SERPL-MCNC: 1.16 MG/DL (ref 0.7–1.2)
DIFFERENTIAL TYPE: ABNORMAL
EOSINOPHILS RELATIVE PERCENT: 6 % (ref 1–4)
GFR AFRICAN AMERICAN: >60 ML/MIN
GFR NON-AFRICAN AMERICAN: 59 ML/MIN
GFR SERPL CREATININE-BSD FRML MDRD: ABNORMAL ML/MIN/{1.73_M2}
GFR SERPL CREATININE-BSD FRML MDRD: ABNORMAL ML/MIN/{1.73_M2}
GLUCOSE BLD-MCNC: 143 MG/DL (ref 70–99)
GLUCOSE BLD-MCNC: 157 MG/DL (ref 75–110)
GLUCOSE BLD-MCNC: 216 MG/DL (ref 75–110)
GLUCOSE BLD-MCNC: 254 MG/DL (ref 75–110)
GLUCOSE BLD-MCNC: 82 MG/DL (ref 75–110)
HCT VFR BLD CALC: 37.6 % (ref 40.7–50.3)
HEMOGLOBIN: 11.9 G/DL (ref 13–17)
IMMATURE GRANULOCYTES: 0 %
LV EF: 60 %
LVEF MODALITY: NORMAL
LYMPHOCYTES # BLD: 26 % (ref 24–43)
MCH RBC QN AUTO: 29.3 PG (ref 25.2–33.5)
MCHC RBC AUTO-ENTMCNC: 31.6 G/DL (ref 28.4–34.8)
MCV RBC AUTO: 92.6 FL (ref 82.6–102.9)
MONOCYTES # BLD: 10 % (ref 3–12)
NRBC AUTOMATED: 0 PER 100 WBC
PDW BLD-RTO: 14 % (ref 11.8–14.4)
PLATELET # BLD: 222 K/UL (ref 138–453)
PLATELET ESTIMATE: ABNORMAL
PMV BLD AUTO: 9.7 FL (ref 8.1–13.5)
POTASSIUM SERPL-SCNC: 4.3 MMOL/L (ref 3.7–5.3)
RBC # BLD: 4.06 M/UL (ref 4.21–5.77)
RBC # BLD: ABNORMAL 10*6/UL
SEG NEUTROPHILS: 58 % (ref 36–65)
SEGMENTED NEUTROPHILS ABSOLUTE COUNT: 6.29 K/UL (ref 1.5–8.1)
SODIUM BLD-SCNC: 140 MMOL/L (ref 135–144)
WBC # BLD: 10.9 K/UL (ref 3.5–11.3)
WBC # BLD: ABNORMAL 10*3/UL

## 2019-04-30 PROCEDURE — 51701 INSERT BLADDER CATHETER: CPT

## 2019-04-30 PROCEDURE — 2060000000 HC ICU INTERMEDIATE R&B

## 2019-04-30 PROCEDURE — 6370000000 HC RX 637 (ALT 250 FOR IP): Performed by: PSYCHIATRY & NEUROLOGY

## 2019-04-30 PROCEDURE — 2500000003 HC RX 250 WO HCPCS: Performed by: STUDENT IN AN ORGANIZED HEALTH CARE EDUCATION/TRAINING PROGRAM

## 2019-04-30 PROCEDURE — 2580000003 HC RX 258: Performed by: STUDENT IN AN ORGANIZED HEALTH CARE EDUCATION/TRAINING PROGRAM

## 2019-04-30 PROCEDURE — 92523 SPEECH SOUND LANG COMPREHEN: CPT

## 2019-04-30 PROCEDURE — 97116 GAIT TRAINING THERAPY: CPT

## 2019-04-30 PROCEDURE — 99233 SBSQ HOSP IP/OBS HIGH 50: CPT | Performed by: PSYCHIATRY & NEUROLOGY

## 2019-04-30 PROCEDURE — 51798 US URINE CAPACITY MEASURE: CPT

## 2019-04-30 PROCEDURE — 97535 SELF CARE MNGMENT TRAINING: CPT

## 2019-04-30 PROCEDURE — 80048 BASIC METABOLIC PNL TOTAL CA: CPT

## 2019-04-30 PROCEDURE — 85025 COMPLETE CBC W/AUTO DIFF WBC: CPT

## 2019-04-30 PROCEDURE — 99232 SBSQ HOSP IP/OBS MODERATE 35: CPT | Performed by: INTERNAL MEDICINE

## 2019-04-30 PROCEDURE — 97162 PT EVAL MOD COMPLEX 30 MIN: CPT

## 2019-04-30 PROCEDURE — 93306 TTE W/DOPPLER COMPLETE: CPT

## 2019-04-30 PROCEDURE — 82947 ASSAY GLUCOSE BLOOD QUANT: CPT

## 2019-04-30 PROCEDURE — 36415 COLL VENOUS BLD VENIPUNCTURE: CPT

## 2019-04-30 PROCEDURE — 99222 1ST HOSP IP/OBS MODERATE 55: CPT | Performed by: PHYSICAL MEDICINE & REHABILITATION

## 2019-04-30 PROCEDURE — 97530 THERAPEUTIC ACTIVITIES: CPT

## 2019-04-30 PROCEDURE — 99222 1ST HOSP IP/OBS MODERATE 55: CPT | Performed by: PSYCHIATRY & NEUROLOGY

## 2019-04-30 PROCEDURE — 6370000000 HC RX 637 (ALT 250 FOR IP): Performed by: STUDENT IN AN ORGANIZED HEALTH CARE EDUCATION/TRAINING PROGRAM

## 2019-04-30 PROCEDURE — 6360000002 HC RX W HCPCS: Performed by: STUDENT IN AN ORGANIZED HEALTH CARE EDUCATION/TRAINING PROGRAM

## 2019-04-30 PROCEDURE — 97166 OT EVAL MOD COMPLEX 45 MIN: CPT

## 2019-04-30 RX ORDER — INSULIN GLARGINE 100 [IU]/ML
10 INJECTION, SOLUTION SUBCUTANEOUS NIGHTLY
Status: DISCONTINUED | OUTPATIENT
Start: 2019-04-30 | End: 2019-05-01 | Stop reason: HOSPADM

## 2019-04-30 RX ORDER — SODIUM CHLORIDE 9 MG/ML
INJECTION, SOLUTION INTRAVENOUS CONTINUOUS
Status: DISCONTINUED | OUTPATIENT
Start: 2019-04-30 | End: 2019-05-01 | Stop reason: HOSPADM

## 2019-04-30 RX ADMIN — Medication 10 ML: at 21:03

## 2019-04-30 RX ADMIN — SODIUM CHLORIDE: 9 INJECTION, SOLUTION INTRAVENOUS at 10:20

## 2019-04-30 RX ADMIN — SENNOSIDES 17.2 MG: 8.6 TABLET, FILM COATED ORAL at 21:02

## 2019-04-30 RX ADMIN — INSULIN LISPRO 5 UNITS: 100 INJECTION, SOLUTION INTRAVENOUS; SUBCUTANEOUS at 13:22

## 2019-04-30 RX ADMIN — MICONAZOLE NITRATE: 20.6 POWDER TOPICAL at 08:36

## 2019-04-30 RX ADMIN — DESMOPRESSIN ACETATE 40 MG: 0.2 TABLET ORAL at 21:02

## 2019-04-30 RX ADMIN — CARBIDOPA AND LEVODOPA 1 TABLET: 25; 100 TABLET ORAL at 15:33

## 2019-04-30 RX ADMIN — INSULIN LISPRO 6 UNITS: 100 INJECTION, SOLUTION INTRAVENOUS; SUBCUTANEOUS at 13:23

## 2019-04-30 RX ADMIN — CLOPIDOGREL 75 MG: 75 TABLET, FILM COATED ORAL at 08:34

## 2019-04-30 RX ADMIN — ASPIRIN 81 MG: 81 TABLET ORAL at 08:34

## 2019-04-30 RX ADMIN — QUETIAPINE FUMARATE 25 MG: 25 TABLET ORAL at 21:02

## 2019-04-30 RX ADMIN — MUPIROCIN: 20 OINTMENT TOPICAL at 08:34

## 2019-04-30 RX ADMIN — ENOXAPARIN SODIUM 40 MG: 40 INJECTION, SOLUTION INTRAVENOUS; SUBCUTANEOUS at 08:36

## 2019-04-30 RX ADMIN — INSULIN GLARGINE 10 UNITS: 100 INJECTION, SOLUTION SUBCUTANEOUS at 21:02

## 2019-04-30 RX ADMIN — Medication 10 ML: at 08:36

## 2019-04-30 RX ADMIN — CARBIDOPA AND LEVODOPA 1 TABLET: 25; 100 TABLET ORAL at 21:02

## 2019-04-30 RX ADMIN — INSULIN LISPRO 2 UNITS: 100 INJECTION, SOLUTION INTRAVENOUS; SUBCUTANEOUS at 21:14

## 2019-04-30 ASSESSMENT — PAIN SCALES - GENERAL
PAINLEVEL_OUTOF10: 3
PAINLEVEL_OUTOF10: 5
PAINLEVEL_OUTOF10: 0

## 2019-04-30 ASSESSMENT — PAIN DESCRIPTION - ORIENTATION
ORIENTATION: LOWER;MID
ORIENTATION: RIGHT

## 2019-04-30 ASSESSMENT — PAIN DESCRIPTION - LOCATION
LOCATION: GROIN
LOCATION: BACK

## 2019-04-30 ASSESSMENT — PAIN DESCRIPTION - DESCRIPTORS: DESCRIPTORS: ACHING;SORE;DISCOMFORT

## 2019-04-30 ASSESSMENT — PAIN DESCRIPTION - FREQUENCY: FREQUENCY: CONTINUOUS

## 2019-04-30 ASSESSMENT — PAIN DESCRIPTION - PAIN TYPE
TYPE: ACUTE PAIN
TYPE: CHRONIC PAIN

## 2019-04-30 NOTE — PROGRESS NOTES
sterile water, sodium chloride flush, magnesium hydroxide, ondansetron, glucose, dextrose, glucagon (rDNA), dextrose, sodium chloride flush    VITALS:  Temperature Range: Temp: 98.7 °F (37.1 °C) Temp  Av.1 °F (36.7 °C)  Min: 97 °F (36.1 °C)  Max: 98.7 °F (37.1 °C)  BP Range: Systolic (13MYG), AMA:396 , Min:81 , FP     Diastolic (10WXS), KVJ:96, Min:20, Max:139    Pulse Range: Pulse  Av.2  Min: 75  Max: 104  Respiration Range: Resp  Av.6  Min: 0  Max: 26  Current Pulse Ox: SpO2: 97 %  24HR Pulse Ox Range: SpO2  Av %  Min: 82 %  Max: 100 %  Patient Vitals for the past 12 hrs:   BP Temp Temp src Pulse Resp SpO2   19 0618 -- -- -- 96 20 97 %   19 0603 (!) 134/39 -- -- 76 20 98 %   19 0548 -- -- -- 75 21 96 %   19 0503 (!) 125/39 -- -- 81 21 95 %   19 0448 -- -- -- 77 17 94 %   19 0433 -- -- -- 77 19 96 %   19 0418 (!) 116/31 -- -- 80 16 95 %   19 0403 (!) 123/36 98.7 °F (37.1 °C) Oral 78 20 96 %   19 0348 (!) 122/37 -- -- 80 20 98 %   19 0333 (!) 112/31 -- -- 82 23 97 %   19 0318 (!) 121/36 -- -- 80 21 98 %   19 0303 (!) 114/36 -- -- 79 22 98 %   19 0248 (!) 124/45 -- -- 80 19 97 %   19 0233 (!) 105/32 -- -- 88 23 97 %   19 0218 (!) 91/25 -- -- 90 19 98 %   19 0203 124/71 -- -- 93 24 99 %   19 0103 (!) 107 -- -- 77 20 98 %   19 0048 (!)  -- -- 83 23 97 %   19 0033 (!)  -- -- 92 24 97 %   19 0018 (!)  -- -- 90 16 94 %   19 0003 -- -- -- 95 24 (!) 82 %   19 2348 (!) 102/34 -- -- 79 22 96 %   19 2333 107/75 -- -- 83 20 --   19 2318 (!) 104/26 -- -- 79 15 92 %   19 2303 (!) 103/ -- -- 78 20 97 %   19 2248 (!) 90/32 -- -- 90 15 94 %   19 2233 (!) 94/52 -- -- 98 19 97 %   19 2218 (!) 81/20 -- -- 80 22 97 %   19 2118 107/78 -- -- 93 20 98 %   19 2103 (!) 134/94 -- -- 104 24 93 %   19 2048 (!) 149/46 -- -- 90 14 91 %   04/29/19 2033 (!) 155/65 -- -- 94 19 98 %   04/29/19 2018 (!) 173/139 -- -- 92 20 97 %   04/29/19 2003 (!) 171/83 98.4 °F (36.9 °C) Oral 97 25 98 %     Estimated body mass index is 27.12 kg/m² as calculated from the following:    Height as of this encounter: 6' 3\" (1.905 m). Weight as of this encounter: 217 lb (98.4 kg).  []<16 Severe malnutrition  []16-16.99 Moderate malnutrition  []17-18.49 Mild malnutrition  [x]18.5-24.9 Normal  []25-29.9 Overweight (not obese)  []30-34.9 Obese class 1 (Low Risk)  []35-39.9 Obese class 2 (Moderate Risk)  []?40 Obese class 3 (High Risk)    RECENT LABS:   Lab Results   Component Value Date    WBC 10.9 04/30/2019    HGB 11.9 (L) 04/30/2019    HCT 37.6 (L) 04/30/2019     04/30/2019    CHOL 139 04/18/2019    TRIG 104 04/18/2019    HDL 55 04/18/2019    ALT 13 04/18/2019    AST 21 04/18/2019     04/30/2019    K 4.3 04/30/2019     (H) 04/30/2019    CREATININE 1.16 04/30/2019    BUN 15 04/30/2019    CO2 20 04/30/2019    TSH 5.92 (H) 04/18/2019    PSA 0.04 04/18/2019    INR 1.0 04/26/2019    LABA1C 6.4 (H) 04/27/2019     24 HOUR INTAKE/OUTPUT:    Intake/Output Summary (Last 24 hours) at 4/30/2019 0755  Last data filed at 4/30/2019 0506  Gross per 24 hour   Intake 1492 ml   Output 2215 ml   Net -723 ml       RADIOLOGY:   Xr Chest Standard (2 Vw)    Result Date: 4/18/2019  EXAMINATION: TWO VIEWS OF THE CHEST 4/18/2019 4:51 pm COMPARISON: Chest x-ray 08/04/2018 HISTORY: ORDERING SYSTEM PROVIDED HISTORY: Chronic obstructive pulmonary disease, unspecified COPD type ([facility] TECHNOLOGIST PROVIDED HISTORY: Ordering Physician Provided Reason for Exam: Shortness of breath Acuity: Acute Type of Exam: Initial Relevant Medical/Surgical History: COPD FINDINGS: Chronic lung changes are present. No new parenchymal opacity. Costophrenic angles are clear. Cardiac and mediastinal structures are unchanged. The bones are unchanged. Chronic lung changes.   No acute abnormality. Xr Abdomen (kub) (single Ap View)    Result Date: 4/27/2019  EXAMINATION: SINGLE XRAY VIEW OF THE CHEST; SINGLE SUPINE XRAY VIEW(S) OF THE ABDOMEN 4/27/2019 8:06 pm COMPARISON: Earlier today. HISTORY: ORDERING SYSTEM PROVIDED HISTORY: Lung sounds crackles TECHNOLOGIST PROVIDED HISTORY: Lung sounds crackles Ordering Physician Provided Reason for Exam: lung sounds crackles; ORDERING SYSTEM PROVIDED HISTORY: MRI clearance TECHNOLOGIST PROVIDED HISTORY: MRI clearance Ordering Physician Provided Reason for Exam: MRI clearance FINDINGS: Chest: Cardiac and mediastinal contours are stable. There is stable chronic reticular opacities in the lungs without consolidation or edema. No pneumothorax or pleural effusion is seen. No acute osseous abnormality is identified. There is no radiopaque foreign body. Abdomen: Nonspecific, nonobstructive bowel gas pattern. Contrast in the urinary bladder. No pathologic calcification. L4 through S1 posterior lumbar fusion hardware and right hip arthroplasty hardware are noted. No radiopaque foreign body is seen otherwise. 1. No acute cardiopulmonary abnormality. 2. No evidence of bowel obstruction. Ct Head Wo Contrast    Result Date: 4/27/2019  EXAMINATION: CT OF THE HEAD WITHOUT CONTRAST  4/27/2019 6:41 am TECHNIQUE: CT of the head was performed without the administration of intravenous contrast. Dose modulation, iterative reconstruction, and/or weight based adjustment of the mA/kV was utilized to reduce the radiation dose to as low as reasonably achievable. COMPARISON: 04/26/2019 HISTORY: ORDERING SYSTEM PROVIDED HISTORY: 12 hours s/p tPA administration TECHNOLOGIST PROVIDED HISTORY: Ordering Physician Provided Reason for Exam: 12 hours s/p tPA administration Acuity: Acute Type of Exam: Subsequent/Follow-up FINDINGS: BRAIN/VENTRICLES: There is prominence of the ventricles and cortical sulci consistent with cortical volume loss. No midline shift.  Basal cisterns are normally outlined. There is no clear evidence for acute infarct. No acute intra or extra-axial hemorrhage. Internal carotid artery calcifications noted. ORBITS: The visualized portion of the orbits demonstrate no acute abnormality. SINUSES: The visualized paranasal sinuses and mastoid air cells demonstrate no acute abnormality. SOFT TISSUES/SKULL:  No acute abnormality of the visualized skull or soft tissues. 1. No acute intracranial hemorrhage. 2. Cerebral atrophy and chronic small vessel ischemic changes in the white matter. Ct Head Wo Contrast    Result Date: 4/26/2019  EXAMINATION: CT OF THE HEAD WITHOUT CONTRAST  4/26/2019 5:25 pm TECHNIQUE: CT of the head was performed without the administration of intravenous contrast. Dose modulation, iterative reconstruction, and/or weight based adjustment of the mA/kV was utilized to reduce the radiation dose to as low as reasonably achievable. COMPARISON: August 4, 2018 HISTORY: ORDERING SYSTEM PROVIDED HISTORY: STROKE TECHNOLOGIST PROVIDED HISTORY: FINDINGS: BRAIN/VENTRICLES: Ventricles sulci are stable. Artifact limits the posterior fossa. There is no mass effect on the 4th ventricle. Small calcifications in the posterior fossa are again noted. Multifocal low-density in the periventricular and subcortical white matter is noted bilaterally. Findings are slightly greater in the right frontal region extending posteriorly to the frontal parietal junction. There may be subtle extension to the right frontal cortex. No hyperdense arteries are noted. Vascular calcifications are noted. There is no hemorrhage ORBITS: The visualized portion of the orbits demonstrate no acute abnormality. SINUSES: The visualized paranasal sinuses and mastoid air cells demonstrate no acute abnormality. SOFT TISSUES/SKULL:  No acute abnormality of the visualized skull or soft tissues. Limitation due to artifact Multifocal small-vessel ischemic change bilaterally. Focal asymmetric low density in the right frontal subcortical white matter with questionable extension of the cortex. This was present on the prior. Subtle increase would be difficult to exclude. If stroke symptoms continue consider MRI. Critical results were called by Dr. Tania Rahman to [facility] Kathie on 4/26/2019 at 17:41. Xr Chest Portable    Result Date: 4/27/2019  EXAMINATION: SINGLE XRAY VIEW OF THE CHEST; SINGLE SUPINE XRAY VIEW(S) OF THE ABDOMEN 4/27/2019 8:06 pm COMPARISON: Earlier today. HISTORY: ORDERING SYSTEM PROVIDED HISTORY: Lung sounds crackles TECHNOLOGIST PROVIDED HISTORY: Lung sounds crackles Ordering Physician Provided Reason for Exam: lung sounds crackles; ORDERING SYSTEM PROVIDED HISTORY: MRI clearance TECHNOLOGIST PROVIDED HISTORY: MRI clearance Ordering Physician Provided Reason for Exam: MRI clearance FINDINGS: Chest: Cardiac and mediastinal contours are stable. There is stable chronic reticular opacities in the lungs without consolidation or edema. No pneumothorax or pleural effusion is seen. No acute osseous abnormality is identified. There is no radiopaque foreign body. Abdomen: Nonspecific, nonobstructive bowel gas pattern. Contrast in the urinary bladder. No pathologic calcification. L4 through S1 posterior lumbar fusion hardware and right hip arthroplasty hardware are noted. No radiopaque foreign body is seen otherwise. 1. No acute cardiopulmonary abnormality. 2. No evidence of bowel obstruction. Xr Chest Portable    Result Date: 4/27/2019  EXAMINATION: SINGLE XRAY VIEW OF THE CHEST 4/27/2019 9:40 am COMPARISON: 04/18/2019 HISTORY: ORDERING SYSTEM PROVIDED HISTORY: eval lungs TECHNOLOGIST PROVIDED HISTORY: eval lungs FINDINGS: The heart and mediastinal structures are stable. Increased interstitial markings are present compatible with chronic interstitial lung disease no new pulmonary process is seen.   The appearance of lungs are similar to the study from 07/06/2018. Stable chest chronic interstitial lung disease. Cta Neck W Contrast    Result Date: 4/27/2019  EXAMINATION: CTA OF THE NECK; CTA OF THE HEAD WITH CONTRAST 4/27/2019 12:07 am: TECHNIQUE: CTA of the neck was performed with the administration of intravenous contrast. Multiplanar reformatted images are provided for review. MIP images are provided for review. Stenosis of the internal carotid arteries measured using NASCET criteria. Dose modulation, iterative reconstruction, and/or weight based adjustment of the mA/kV was utilized to reduce the radiation dose to as low as reasonably achievable.; CTA of the head/brain was performed with the administration of intravenous contrast. Multiplanar reformatted images are provided for review. MIP images are provided for review. Dose modulation, iterative reconstruction, and/or weight based adjustment of the mA/kV was utilized to reduce the radiation dose to as low as reasonably achievable. 3D reconstructed images were performed on a separate workstation and provided for review. COMPARISON: CT neck 07/08/2008 CT head 04/26/2019 HISTORY: ORDERING SYSTEM PROVIDED HISTORY: stroke like symptoms, [facility] 4 TECHNOLOGIST PROVIDED HISTORY: Ordering Physician Provided Reason for Exam: stroke-like symptoms Acuity: Acute Type of Exam: Unknown FINDINGS: CTA NECK: AORTIC ARCH/ARCH VESSELS: There is a normal branch pattern of the aortic arch. No significant stenosis is seen of the innominate artery or subclavian arteries. CAROTID ARTERIES: The common carotid arteries demonstrate mild atherosclerotic changes without evidence of a flow limiting stenosis. Heavy atherosclerotic plaque identified right carotid bifurcation extending into the right proximal ICA resulting in subtotal, greater than 90% stenosis right proximal ICA per NASCET criteria. .  The left internal carotid artery demonstrates mild plaque left carotid bifurcation.   Normal in appearance without evidence of a flow dose to as low as reasonably achievable.; CTA of the head/brain was performed with the administration of intravenous contrast. Multiplanar reformatted images are provided for review. MIP images are provided for review. Dose modulation, iterative reconstruction, and/or weight based adjustment of the mA/kV was utilized to reduce the radiation dose to as low as reasonably achievable. 3D reconstructed images were performed on a separate workstation and provided for review. COMPARISON: CT neck 07/08/2008 CT head 04/26/2019 HISTORY: ORDERING SYSTEM PROVIDED HISTORY: stroke like symptoms, [facility] 4 TECHNOLOGIST PROVIDED HISTORY: Ordering Physician Provided Reason for Exam: stroke-like symptoms Acuity: Acute Type of Exam: Unknown FINDINGS: CTA NECK: AORTIC ARCH/ARCH VESSELS: There is a normal branch pattern of the aortic arch. No significant stenosis is seen of the innominate artery or subclavian arteries. CAROTID ARTERIES: The common carotid arteries demonstrate mild atherosclerotic changes without evidence of a flow limiting stenosis. Heavy atherosclerotic plaque identified right carotid bifurcation extending into the right proximal ICA resulting in subtotal, greater than 90% stenosis right proximal ICA per NASCET criteria. .  The left internal carotid artery demonstrates mild plaque left carotid bifurcation. Normal in appearance without evidence of a flow limiting stenosis by NASCET criteria. No dissection or arterial injury is seen. VERTEBRAL ARTERIES: The vertebral arteries both arise from the subclavian arteries and are normal in caliber without evidence of flow limiting stenosis or dissection. SOFT TISSUES: The lung apices are clear. No cervical or superior mediastinal lymphadenopathy. The visualized portion of the larynx and pharynx appear unremarkable. The parotid, submandibular and thyroid glands demonstrate no acute abnormality.  BONES: The visualized osseous structures demonstrate multilevel degenerative changes. There subpleural areas of interstitial thickening suggestive underlying airspace disease involving both lung apices. . CTA HEAD: ANTERIOR CIRCULATION: Moderate plaque identified involving the cavernous and supraclinoid ICAs resulting in up to 40-50% stenosis involving the proximal cavernous segments. The anterior cerebral and middle cerebral arteries demonstrate no focal stenosis. POSTERIOR CIRCULATION: The posterior cerebral arteries demonstrate no focal stenosis. The vertebral and basilar arteries appear unremarkable. BRAIN: No mass effect or midline shift. No abnormal extra-axial fluid collection. The gray-white differentiation appears grossly maintained. 90% or greater subtotal stenosis identified right proximal ICA level carotid bifurcation. No hemodynamic stenosis or occlusion identified involving intracranial arterial circulation     Mri Brain W Wo Contrast    Result Date: 4/27/2019  EXAMINATION: MRI OF THE BRAIN WITHOUT AND WITH CONTRAST  4/27/2019 11:14 pm TECHNIQUE: Multiplanar multisequence MRI of the head/brain was performed without and with the administration of intravenous contrast. COMPARISON: None. HISTORY: ORDERING SYSTEM PROVIDED HISTORY: post TPA, stroke symptoms w 90% L ICA stenosis. FINDINGS: Mild-to-moderate motion degradation challenge evaluation. INTRACRANIAL STRUCTURES/VENTRICLES:  No evidence of restricted diffusion to suggest acute territory infarction. Moderate generalized involutional change brain parenchyma identified with prominence of ventricles and sulci. Moderate periventricular and subcortical T2 prolongation identified suggestive chronic small vessel ischemic disease. Minimal gliosis identified along the right hand knob along the right precentral gyrus. A few scattered chronic lacune infarcts identified involving both frontal and parietal centrum semiovale. Major intracranial vascular flow voids are maintained. Subtle region grossly unremarkable.   No hemorrhagic transformation status post tPA. ORBITS: The visualized portion of the orbits demonstrate no acute abnormality. SINUSES: Mild-to-moderate ethmoid sinus and maxillary sinus mucosal thickening. BONES/SOFT TISSUES: The bone marrow signal intensity appears normal. The soft tissues demonstrate no acute abnormality. Moderate chronic small ischemic disease and age related involutional change without acute stroke, midline shift or mass effect. No hemorrhagic transformation status post tPA. Labs and Images reviewed with:  [] Dr. Amalia Avila. Sree    [] Dr. Viki Stevens  [x] Dr. Mendel Alamin  [] There are no new interval images to review. PHYSICAL EXAM            CONSTITUTIONAL:  Well developed, well nourished, Nontoxic.mild dysarthia   HEAD:  normocephalic, atraumatic    EYES:  PERRLA, EOMI.   ENT:  moist mucous membranes   NECK:  supple, symmetric   LUNGS:  Equal air entry bilaterally   CARDIOVASCULAR:  normal s1 / s2, RRR, distal pulses intact   ABDOMEN:  Soft, no rigidity   NEUROLOGIC:  Mental Status: AAAx3     Cranial Nerves:    cranial nerves II-XII are grossly intact     Motor Exam:    Drift:  absent  Tone:  normal     Motor exam is symmetrical 5 out of 5 all extremities bilaterally     Sensory:    Touch:    Right Upper Extremity:  normal  Left Upper Extremity:  normal  Right Lower Extremity:  normal  Left Lower Extremity:  normal     Deep Tendon Reflexes:    Right Bicep:  2+  Left Bicep:  2+  Right Knee:  2+  Left Knee:  2+     Plantar Response:  Right:  downgoing  Left:  downgoing     Clonus:  absent  Varela's:  absent     Coordination/Dysmetria:  Heel to Shin:  Right:  normal  Finger to Nose:   Right:  normal   Dysdiadochokinesia:  absent     Gait:  Not tested           DRAINS:  [] There are no drains for Neuro Critical Care to monitor at this time.      ASSESSMENT AND PLAN:       81 y/o  Had acute osnet of dysarthria and left sie weakness s/p t-PA at [facility] at 7 pn 4/26 and transferred        NEUROLOGIC:  - Ct head showed ischemic changes, CTaH/N showed 90% of stenosis to L ICA   MRI 24 hr post t-Pa showed chronic small vessel changes  and no acute stroke or bleed. - s/p  R ICA stent on 4/29  - Goal SBP   - will start ASA 81 mg and Plavix 75 mg  - geodon prn for agitation  -has b/l hand tremors x 6 months started on sinemnt and would recommend  neurolgy f/u upon discharge  - Neuro checks per protocol     CARDIOVASCULAR:  - Goal SBP   - statin 40 mg  - Continue telemetry  -labetalol, hydralazine   - f/u echo     PULMONARY:  -Monitor saturation     RENAL/FLUID/ELECTROLYTE:  - BUN 15/ Creatinine 1.16  - IVF@ 75 cc/hr  - Replace electrolytes PRN  - Daily BMP     GI/NUTRITION:  NUTRITION:  - advance diet as toelrated  - Bowel regimen: MoM  - GI prophylaxis: Pepcid     ID:  -afebrile  - Continue to monitor for fevers  - Daily CBC     HEME:   - H&H 11.9/37.6  - Platelets 222  - Daily CBC     ENDOCRINE:  - Continue to monitor blood glucose, goal <180  - hypoglycemia protocol, IM on board for glycemic control     OTHER:  - PT/OT/ST   - Code Status: full           DVT PROPHYLAXIS:  - SCD sleeves - Thigh High   - EMMA stockings - Thigh High   - on ASA 81 mg and Plavix 75 mg   -Lovenox for dvt        DISPOSITION:  [] To remain ICU:   [x] OK for out of ICU from Neuro Critical Care standpoint    We will continue to follow along. For any changes in exam or patient status please contact Neuro Critical Care.       Breann Caruso MD  Neuro Critical Care  Pager [phone]  4/30/2019     7:55 AM

## 2019-04-30 NOTE — PROGRESS NOTES
Report called to [address] [facility] on [facility]. Pt transferred with all belongings via bed. Oriented to room and call light. Wife present on transfer. Continue to closely monitor.

## 2019-04-30 NOTE — PROGRESS NOTES
[address] [facility].  Progress Note             Date:   4/30/2019  Patient name:  Nikole John  Date of admission:  4/27/2019  8:45 AM  MRN:   [id number]  YOB: 1931    CHIEF COMPLAINT     Hypoglycemia    HISTORY OF PRESENT ILLNESS      The patient is a 80 y.o.  male with past medical history of diabetes, COPD who is admitted to the hospital when he was transferred from [facility] due to acute onset of altered mental status, dysarthria and left-sided hemiparesis. He received TPA at [facility]. CT head showed cerebral atrophy and small vessel ischemic changes. MRI brain done showed small ischemic disease and age-related mentation change. CTA head and neck showed 90% artery stenosis of right proximal ICA level carotid bifurcation. Discussed with the wife, she states that the patient's blood sugar was running in 120s at home. The patient had a hypoglycemic episode overnight, and surgery was held today. Recent adjustment in his insulin Lantus were made by the patient's PCP.      04/30/19 Current evaluation -   Patient hemodynamically stable   Blood sugars improved  d5 1/2 NS dced yesterday  PO intake improved     PAST MEDICAL HISTORY       has a past medical history of Arthritis, Asthma, Cystitis, Diabetic neuropathy ([address] Diarrhea, Dizziness, GERD (gastroesophageal reflux disease), H/O acute bronchitis, [place] (hard of hearing), Hx-TIA (transient ischemic attack), Hypertension, Kyphosis of thoracolumbar region, Neurogenic claudication, Numbness and tingling, Onychomycosis, Prolonged emergence from general anesthesia, Spinal stenosis of lumbar region, Spondylolysis of lumbar region, Type II or unspecified type diabetes mellitus without mention of complication, not stated as uncontrolled, and Wears glasses. PAST SURGICAL HISTORY       has a past surgical history that includes Tonsillectomy; Cystocopy; Colonoscopy;  Total knee arthroplasty (Left); Inguinal hernia repair (Bilateral); Cataract removal with implant (Bilateral); Lumbar spine surgery; Revision total knee arthroplasty (Left); and Total hip arthroplasty (Right, 07/13/2016). HOME MEDICATIONS        Prior to Admission medications    Medication Sig Start Date End Date Taking? Authorizing Provider   clopidogrel (PLAVIX) 75 MG tablet  4/21/19   Historical Provider, MD   fluticasone (FLONASE) 50 MCG/ACT nasal spray 2 sprays by Nasal route daily 2 SPRAYS IN EACH NOSTRIL. 8/7/18 9/6/18  Adrien Walker DO   montelukast (SINGULAIR) 10 MG tablet  8/15/17   Historical Provider, MD   metoprolol tartrate (LOPRESSOR) 50 MG tablet Take 50 mg by mouth daily  5/6/17   Historical Provider, MD   omeprazole (PRILOSEC) 20 MG delayed release capsule Take 20 mg by mouth Daily Indications: ([facility] Mail order)     Historical Provider, MD   budesonide-formoterol (SYMBICORT) 160-4.5 MCG/ACT AERO Inhale 1 puff into the lungs 2 times daily Indications: ([facility] Mail order)     Historical Provider, MD   LANTUS SOLOSTAR 100 UNIT/ML injection pen Inject 60 Units into the skin daily  Patient taking differently: Inject 55 Units into the skin daily  7/16/16   Brody Randhawa MD   BD ULTRA-FINE PEN NEEDLES 29G X 12.7MM MISC Inject 1 each into the skin daily  4/7/16   Historical Provider, MD   loperamide (IMODIUM) 2 MG capsule Take 2 mg by mouth 4 times daily as needed. Historical Provider, MD       ALLERGIES      Bactrim [sulfamethoxazole-trimethoprim]; Other; Sulfa antibiotics; Sulfur; Versed [midazolam]; and Pcn [penicillins]    SOCIAL HISTORY       reports that he has quit smoking. His smoking use included cigarettes. He quit smokeless tobacco use about 32 years ago. He reports that he does not drink alcohol or use drugs.      FAMILY HISTORY      family history includes Coronary Art Dis in his father; Emphysema in his mother; Heart Attack in his father; High Blood Pressure in his father. REVIEW OF SYSTEMS      Unable to be obtained because of patient's mentation. PHYSICAL EXAM      BP (!) 124/51   Pulse 92   Temp 98.2 °F (36.8 °C)   Resp 17   Ht 6' 3\" (1.905 m)   Wt 217 lb (98.4 kg) Comment: from floor  SpO2 97%   BMI 27.12 kg/m²      · General appearance: well nourished  · HEENT: Head: Normocephalic, no lesions, without obvious abnormality. · Lungs: clear to auscultation bilaterally  · Heart: regular rate and rhythm, S1, S2 normal, no murmur, click, rub or gallop  · Abdomen: soft, non-tender; bowel sounds normal; no masses,  no organomegaly  · Extremities: extremities normal, atraumatic, no cyanosis or edema  · Neurological: Gait normal. Reflexes normal and symmetric. Sensation grossly normal  · Skin - no rash, no lump   · Eye no icterus no redness  · Psych-normal affect   · NEURO-no limb weakness  No facial droop  · Lymphatic system-no lymphadenopathy no splenomegaly     DIAGNOSTICS      Laboratory Testing:  CBC:   Recent Labs     04/30/19  0502   WBC 10.9   HGB 11.9*        BMP:    Recent Labs     04/28/19  0856 04/29/19  0938 04/30/19  0502    135 140   K 4.4 4.8 4.3    103 108*   CO2 21 21 20   BUN 12 15 15   CREATININE 1.16 1.13 1.16   GLUCOSE 146* 187* 143*     S. Calcium:  Recent Labs     04/30/19  0502   CALCIUM 8.6     S. Ionized Calcium:No results for input(s): IONCA in the last 72 hours. S. Magnesium:No results for input(s): MG in the last 72 hours. S. Phosphorus:No results for input(s): PHOS in the last 72 hours. S. Glucose:  Recent Labs     04/29/19  2032 04/30/19  0831 04/30/19  1201   POCGLU 234* 157* 254*     Glycosylated hemoglobin A1C:   No results for input(s): LABA1C in the last 72 hours. INR:   No results for input(s): INR in the last 72 hours. Hepatic functions: No results for input(s): ALKPHOS, ALT, AST, PROT, BILITOT, BILIDIR, LABALBU in the last 72 hours.   Pancreatic functions:  No results for input(s): LACTA, AMYLASE in the last 72 hours. S. Lactic Acid:   No results for input(s): LACTA in the last 72 hours. Cardiac enzymes:  No results for input(s): CKTOTAL, CKMB, CKMBINDEX, TROPONINI in the last 72 hours. BNP:No results for input(s): BNP in the last 72 hours. Lipid profile: No results for input(s): CHOL, TRIG, HDL, LDLCALC in the last 72 hours. Invalid input(s): LDL  Blood Gases: No results found for: PH, PCO2, PO2, HCO3, O2SAT  Thyroid functions:   Lab Results   Component Value Date    TSH 5.92 04/18/2019        Imaging/Diagonstics:      CXR: No acute cardiopulmonary findings. ASSESSMENT     Active Problems:    Ischemic stroke (HCC)    Tissue plasminogen activator (t-PA) administered at other facility within 24 hours prior to current admission    Hypoglycemia    History of diabetes mellitus    Carotid stenosis, symptomatic w/o infarct, right  Resolved Problems:    * No resolved hospital problems. *    PLAN      1. Hypoglycemia: improved  2. Hyperglycemia - start lantus 10 u with 5 u with humalog  With meals. Medium dose ISSS  3. Ischemic stroke: Status post tPA at outlying facility, CTA showing more than 90 person ICA stenosis: Management as per primary. Patient to go for ICA stenting today   4. Continue ASA, Plavix and statin   5. Continue IV hydration   6. Thank you for the consult. We will sign off at present. Please call back if any questions. IM Attending    Pt seen and examined 4/30  I have discussed the care of pt , including pertinent history and exam findings,  with the resident. I have reviewed the key elements of all parts of the encounter with the resident. I agree with the assessment, plan and orders as documented by the resident.     Electronically signed by David Terrazas MD

## 2019-04-30 NOTE — CARE COORDINATION
Pt's spouse refused Inpatient Rehab and requested referral to [facility]. Referral faxed to [facility] via [employer].

## 2019-04-30 NOTE — CONSULTS
I had the pleasure of seeing your patient in neurology consultation for his symptoms. As you would recall Cleotilde Primrose is a 80 y.o. yo male admitted on 4/27/2019. The patient initially presented with altered mental status. He was last known well on 3:30 PM on  4/6/2019. At 4:30 PM, the wife noticed patient mumbling his words. EMS was called in. Patient's blood glucose was 77. He received 1 amp of D50 and was taken to [facility] ER where he was noted to be drowsy. His initial and I asked stroke scale was 4. The patient received IV t-PA at 7 PM on 4/26/2019. CTA head and neck showed 90% ICA occlusion. The wife states that since 2009 the patient has had 5 episodes of similar spells. She describes that during the spells he would appear confused and disoriented. During some of these episodes, he would be able to speak but his speech would be out of context. These episodes would get better within a few minutes. Previously these were diagnosed with TIA. At least during one of the event, his fingerstick blood glucose was 50. The Luann Mileser is unsure about the glucose during other events.    Past Medical History:   Diagnosis Date    Arthritis     Asthma     Cystitis     Diabetic neuropathy (HCC)     Diarrhea     Dizziness     GERD (gastroesophageal reflux disease)     H/O acute bronchitis     [place] (hard of hearing)     Hx-TIA (transient ischemic attack)     h/o 4, 2009, 2012, 2014, 2015    Hypertension     Kyphosis of thoracolumbar region     Neurogenic claudication     Numbness and tingling     feet    Onychomycosis     Prolonged emergence from general anesthesia     Spinal stenosis of lumbar region     Spondylolysis of lumbar region     Type II or unspecified type diabetes mellitus without mention of complication, not stated as uncontrolled     Wears glasses      Past Surgical History:   Procedure Laterality Date    CATARACT REMOVAL WITH IMPLANT Bilateral     COLONOSCOPY      CYSTOSCOPY      INGUINAL HERNIA REPAIR Bilateral     LUMBAR SPINE SURGERY      lumbar-6 screws and 2 bars    REVISION TOTAL KNEE ARTHROPLASTY Left     total of 3 d/t infections    TONSILLECTOMY      TOTAL HIP ARTHROPLASTY Right 07/13/2016    TOTAL KNEE ARTHROPLASTY Left     , 4 total surgeries, infection, I&D, spacer placed and removed     Social History     Socioeconomic History    Marital status:      Spouse name: Not on file    Number of children: Not on file    Years of education: Not on file    Highest education level: Not on file   Occupational History    Not on file   Social Needs    Financial resource strain: Not on file    Food insecurity:     Worry: Not on file     Inability: Not on file    Transportation needs:     Medical: Not on file     Non-medical: Not on file   Tobacco Use    Smoking status: Former Smoker     Types: Cigarettes    Smokeless tobacco: Former User     Quit date: 7/6/1986    Tobacco comment: stopped 30 years ago    Substance and Sexual Activity    Alcohol use: No    Drug use: No    Sexual activity: Not on file   Lifestyle    Physical activity:     Days per week: Not on file     Minutes per session: Not on file    Stress: Not on file   Relationships    Social connections:     Talks on phone: Not on file     Gets together: Not on file     Attends [employer] service: Not on file     Active member of club or organization: Not on file     Attends meetings of clubs or organizations: Not on file     Relationship status: Not on file    Intimate partner violence:     Fear of current or ex partner: Not on file     Emotionally abused: Not on file     Physically abused: Not on file     Forced sexual activity: Not on file   Other Topics Concern    Not on file   Social History Narrative    Not on file     The patient has a family history of insignificant  ROS:  Constitutional Negative for fever and chills   HEENT Negative for ear discharge, ear pain, nosebleed   Eyes Negative for photophobia, pain and discharge   Respiratory Negative for hemoptysis and sputum   Cardiovascular Negative for orthopnea, claudication and PND   Gastrointestinal Negative for abdominal pain, diarrhea, blood in stool   Musculoskeletal Negative for joint pain, negative for myalgia   Skin Negative for rash or itching   Endo/heme/allergies Negative for polydipsia, environmental allergy   Psychiatric/behavioral Negative for suicidal ideation. Patient is not anxious     Neurological examination:    Mental status   Alert and oriented; intact memory with no confusion, speech or language problems; no hallucinations or delusions     Cranial nerves   II - visual fields intact to confrontation                                                III, IV, VI - extra-ocular muscles full: no pupillary defect; no SRIRAM, no nystagmus, no ptosis                                                                      V - normal facial sensation                                                               VII - normal facial symmetry                                                             VIII - intact hearing                                                                             IX, X - symmetrical palate                                                                  XI - symmetrical shoulder shrug                                                       XII - midline tongue without atrophy or fasciculation     Motor function  Normal muscle bulk and tone; normal power 5/5, including fine motor movements     Sensory function Intact to touch, pin, vibration, proprioception     Cerebellar Intact fine motor movement. No involuntary movements or tremors     Reflex function Intact 2+ DTR and symmetric.  Negative Babinski     Gait                  Not tested         Lab Results   Component Value Date    LDLCHOLESTEROL 63 04/18/2019     No components found for: CHLPL  Lab Results   Component Value Date    TRIG 104 04/18/2019

## 2019-04-30 NOTE — PROGRESS NOTES
to take to intervention later during the week, will get IM consulted for DM mangement and make pt stepdown if stable              4/29:  No acute events overnight.  sbp <180, afebrile BS <200  On/off D5 for gluose controlcortisol level 9.5 and GH pending on Seroquel 25 mg qhs, endovascular on board for stent today for R ICA,  IM on board for hypoglycemia management.                 Last 24h:   S/p R ICA stenting maintaining sbp of  on ASA 81mg and Plavix 75mg h/h stable no wbc count afebrile on lovenox for dvt started today,started on sinemet for b/l hand tremors need neurology f/u upon discharge PT/OT on board, patient stable for neurostepdown        Procedures during patient's ICU stay:     R ICA stent    Current Vitals:     BP (!) 124/51   Pulse 92   Temp 98.2 °F (36.8 °C)   Resp 17   Ht 6' 3\" (1.905 m)   Wt 217 lb (98.4 kg) Comment: from floor  SpO2 97%   BMI 27.12 kg/m²       Cultures:       Blood cultures:      [] None drawn      [] Negative             []  Positive (Details:  )  Urine Culture:        [] None drawn      [] Negative             []  Positive (Details:  )  Sputum Culture:   [] None drawn       [] Negative             []  Positive (Details:  )       Consults:         Assessment:     Patient Active Problem List    Diagnosis Date Noted    Carotid stenosis, symptomatic w/o infarct, right     Ischemic stroke ([facility] [address] 04/27/2019    Tissue plasminogen activator (t-PA) administered at other facility within 24 hours prior to current admission     Hypoglycemia     History of diabetes mellitus     Received intravenous tissue plasminogen activator (t-PA) in emergency department 04/26/2019    Internal carotid artery stenosis, right 08/04/2018    Dizziness 07/06/2018    Weight loss 09/26/2016    Right lower quadrant abdominal pain 09/26/2016    Anemia 09/26/2016    Right hip pain 05/19/2016    Arthritis of right hip 04/26/2016    Postural kyphosis of lumbar region 11/23/2015    Lumbar spondylolysis 03/05/2012    Lumbosacral spondylosis without myelopathy 12/07/2011    Degeneration of lumbar or lumbosacral intervertebral disc 12/07/2011    Spinal stenosis, lumbar region, with neurogenic claudication 12/07/2011         Recommended Follow-up:     F/u stroke work-up  PT/OT  F/u with neurology and endovascular in 3 months        Above mentioned assessment and plan was discussed by me with the admitting medicine resident. The medicine team assigned to the patient by medicine admitting resident will be following up the patient from now onwards on the floor.      Trenton Cortes M.D.  PGY - 2  Department of Neurology  [facility] [address] [place] [address])             4/30/2019, 12:09 PM

## 2019-04-30 NOTE — PROGRESS NOTES
a week. Cognition   Cognition  Overall Cognitive Status: WFL    Objective     Observation/Palpation  Posture: Fair  Observation: Kyphotic posture    AROM RLE (degrees)  RLE AROM: WFL  AROM LLE (degrees)  LLE AROM : WFL  AROM RUE (degrees)  RUE AROM : WFL  AROM LUE (degrees)  LUE AROM : WFL  Strength RLE  Strength RLE: Exception  Comment: Grossly 3+/5  Strength LLE  Strength LLE: Exception  Comment: Grossly 3+/5  Strength RUE  Strength RUE: WFL  Strength LUE  Strength LUE: WFL  Tone RLE  RLE Tone: Normotonic  Tone LLE  LLE Tone: Normotonic  Sensation  Overall Sensation Status: WFL(denies numbness and tingling)  Bed mobility  Supine to Sit: Contact guard assistance  Sit to Supine: Contact guard assistance  Scooting: Stand by assistance  Transfers  Sit to Stand: (from [place] and chair)  Stand to sit: Minimal Assistance  Comment: Pt requires verbal cueing for safe transfers and requires increased time to complete  Ambulation  Ambulation?: Yes  Ambulation 1  Surface: level tile  Device: Rolling Walker  Assistance: Minimal assistance;2 Person assistance  Quality of Gait: Flexed knees and decreased sumit with shuffle like steps  Distance: 50' x 2 with one seated rest break  Comments: Pt reports that baseline he only walks 3 feet. Stairs/Curb  Stairs?: No     Balance  Posture: Fair  Sitting - Static: Good  Sitting - Dynamic: Good  Standing - Static: Fair;+  Standing - Dynamic: Fair;-  Comments: EOB ~5 minutes SBA.  Standing balance assessed with RW  Exercises  Comments: sit to stand x 6 with Min A     Plan   Plan  Times per week: 5-6 x per week  Times per day: Daily  Current Treatment Recommendations: Strengthening, Balance Training, Functional Mobility Training, Transfer Training, Gait Training, Endurance Training, Neuromuscular Re-education, Safety Education & Training  Safety Devices  Type of devices: Left in chair, Call light within reach, Nurse notified, Gait belt, Patient at risk for falls, Chair alarm in place  Restraints  Initially in place: No      AM-PAC Score  AM-PAC Inpatient Mobility Raw Score : 15  AM-PAC Inpatient T-Scale Score : 39.45  Mobility Inpatient CMS 0-100% Score: 57.7  Mobility Inpatient CMS G-Code Modifier : CK          Goals  Short term goals  Time Frame for Short term goals: 12 visits  Short term goal 1: independent with bed mobility and SBA for transfers  Short term goal 2: Pt ambulate 100' CGA x1 with RW  Short term goal 3: Increase LE strength to 4-/5   Short term goal 4: improve dynamic balance to fair to indicate decreased risk of fall       Therapy Time   Individual Concurrent Group Co-treatment   Time In 0820         Time Out 0914         Minutes [address] [place] [address], Student Physical Therapist  This treatment/evaluation completed by signing SPT. Signing PT agrees with treatment and documentation.

## 2019-04-30 NOTE — CONSULTS
Consult to Physical Medicine Rehab  Consult performed by: Ana Shultz MD  Consult ordered by: Viki Stevens MD        Physical Medicine & Rehabilitation  Consult Note      Admitting Physician:   Ed Salazar MD    Primary Care Provider:   Iwona Grover DO     Reason for Consult:  Acute Inpatient Rehabilitation    Chief Complaint: L hemiparesis, dysarthria    History of Present Illness:  Referring Provider is requesting an evaluation for appropriate placement upon discharge from acute care. History from chart review and patient/wife. Simona Ghosh is a 80 y.o. RHD male admitted to [place] [address] on 4/27/2019. Patient admitted to [facility] 4/26/19 for speech impairment and L hemiparesis. He received tPA and was admitted to ICU. CTA showed > 90% R ICA stenosis and patient was transferred to [facility] for further management. Also having recurrent hypoglycemia. Initial NIHSS was 4. He had cerebral angiogram with R ICA stent placement performed by Felton Buitrago and Kina on 4/29/19. Patient denies pain. He reports fatigue and weakness today. Most of history is obtained from patient's wife. Review of Systems:  Constitutional: negative for anorexia, chills, fatigue, fevers, sweats and weight loss  Eyes: negative for redness and visual disturbance  Ears, nose, mouth, throat, and face: negative for earaches, sore throat and tinnitus  Respiratory: negative for cough and shortness of breath  Cardiovascular: negative for chest pain, dyspnea and palpitations  Gastrointestinal: negative for abdominal pain, change in bowel habits, constipation, nausea and vomiting. Had BM this morning.   Genitourinary:negative for dysuria, frequency, hesitancy and urinary incontinence  Integument/breast: negative for pruritus and rash  Musculoskeletal:negative for stiff joints  Neurological: negative for dizziness, headaches   Behavioral/Psych: negative for decreased appetite, depression and fatigue       Premorbid function:  Assistance for ADLs, receives [facility]    Current function:    PT:  Restrictions/Precautions: General Precautions, Fall Risk, Up as Tolerated  Other position/activity restrictions: SBP    Transfers  Sit to Stand: (from Janell Santamaria and chair)  Stand to sit: Minimal Assistance  Comment: Pt requires verbal cueing for safe transfers and requires increased time to complete  Ambulation 1  Surface: level tile  Device: Rolling Walker  Assistance: Minimal assistance, 2 Person assistance  Quality of Gait: Flexed knees and decreased sumit with shuffle like steps  Distance: 50' x 2 with one seated rest break  Comments: Pt reports that baseline he only walks 3 feet. Transfers  Sit to Stand: (from Janell Santamaria and chair)  Stand to sit: Minimal Assistance  Comment: Pt requires verbal cueing for safe transfers and requires increased time to complete  Ambulation  Ambulation?: Yes  Ambulation 1  Surface: level tile  Device: Rolling Walker  Assistance: Minimal assistance, 2 Person assistance  Quality of Gait: Flexed knees and decreased sumit with shuffle like steps  Distance: 50' x 2 with one seated rest break  Comments: Pt reports that baseline he only walks 3 feet. Surface: level tile  Ambulation 1  Surface: level tile  Device: Rolling Walker  Assistance: Minimal assistance, 2 Person assistance  Quality of Gait: Flexed knees and decreased sumit with shuffle like steps  Distance: 50' x 2 with one seated rest break  Comments: Pt reports that baseline he only walks 3 feet.       OT:                          Bed mobility  Supine to Sit: Contact guard assistance  Sit to Supine: Contact guard assistance  Scooting: Stand by assistance                      Past Medical History:        Diagnosis Date    Arthritis     Asthma     Cystitis     Diabetic neuropathy (HCC)     Diarrhea     Dizziness     GERD (gastroesophageal reflux disease)     H/O acute bronchitis     [place] (hard of hearing)     Hx-TIA (transient ischemic attack)     h/o 4,  2009, 2012, 2014, 2015    Hypertension     Kyphosis of thoracolumbar region     Neurogenic claudication     Numbness and tingling     feet    Onychomycosis     Prolonged emergence from general anesthesia     Spinal stenosis of lumbar region     Spondylolysis of lumbar region     Type II or unspecified type diabetes mellitus without mention of complication, not stated as uncontrolled     Wears glasses        Past Surgical History:        Procedure Laterality Date    CATARACT REMOVAL WITH IMPLANT Bilateral     COLONOSCOPY      CYSTOSCOPY      INGUINAL HERNIA REPAIR Bilateral     LUMBAR SPINE SURGERY      lumbar-6 screws and 2 bars    REVISION TOTAL KNEE ARTHROPLASTY Left     total of 3 d/t infections    TONSILLECTOMY      TOTAL HIP ARTHROPLASTY Right 07/13/2016    TOTAL KNEE ARTHROPLASTY Left     , 4 total surgeries, infection, I&D, spacer placed and removed       Allergies: Allergies   Allergen Reactions    Bactrim [Sulfamethoxazole-Trimethoprim]      Unknown reaction    Other      baset    Sulfa Antibiotics Other (See Comments)     psoriasis    Sulfur      Causes pt to break out psorsias    Versed [Midazolam] Other (See Comments)     Extreme confusion and agitation    Pcn [Penicillins] Hives     Also causes pt's throat to swell a little.         Current Medications:   Current Facility-Administered Medications: enoxaparin (LOVENOX) injection 40 mg, 40 mg, Subcutaneous, Daily  miconazole (MICOTIN) 2 % powder, , Topical, BID  0.9 % sodium chloride infusion, , Intravenous, Continuous  mupirocin (BACTROBAN) 2 % ointment, , Topical, Daily  niCARdipine (CARDENE) 20 mg in 0.9 % sodium chloride 200 mL infusion, 5 mg/hr, Intravenous, Continuous  acetaminophen (TYLENOL) tablet 650 mg, 650 mg, Oral, Q4H PRN  senna (SENOKOT) tablet 17.2 mg, 2 tablet, Oral, Nightly  bisacodyl (DULCOLAX) suppository 10 mg, 10 mg, Rectal, Daily PRN  guaiFENesin tablet 200 mg, 200 mg, Oral, Q6H PRN  fentaNYL (SUBLIMAZE) injection 50 mcg, 50 mcg, Intravenous, Q4H PRN  hydrALAZINE (APRESOLINE) injection 10 mg, 10 mg, Intravenous, Q4H PRN  ziprasidone (GEODON) injection 5 mg, 5 mg, Intramuscular, Q6H PRN **AND** sterile water injection 1.2 mL, 1.2 mL, Intramuscular, Q6H PRN  QUEtiapine (SEROQUEL) tablet 25 mg, 25 mg, Oral, Nightly  sodium chloride flush 0.9 % injection 10 mL, 10 mL, Intravenous, 2 times per day  sodium chloride flush 0.9 % injection 10 mL, 10 mL, Intravenous, PRN  magnesium hydroxide (MILK OF MAGNESIA) 400 MG/5ML suspension 30 mL, 30 mL, Oral, Daily PRN  ondansetron (ZOFRAN) injection 4 mg, 4 mg, Intravenous, Q6H PRN  glucose (GLUTOSE) 40 % oral gel 15 g, 15 g, Oral, PRN  dextrose 50 % solution 12.5 g, 12.5 g, Intravenous, PRN  glucagon (rDNA) injection 1 mg, 1 mg, Intramuscular, PRN  dextrose 5 % solution, 100 mL/hr, Intravenous, PRN  atorvastatin (LIPITOR) tablet 40 mg, 40 mg, Oral, Nightly  aspirin EC tablet 325 mg, 325 mg, Oral, Once  clopidogrel (PLAVIX) tablet 300 mg, 300 mg, Oral, Once  aspirin EC tablet 81 mg, 81 mg, Oral, Daily  clopidogrel (PLAVIX) tablet 75 mg, 75 mg, Oral, Daily  sodium chloride flush 0.9 % injection 10 mL, 10 mL, Intravenous, PRN    Social History:  Lives with: Wife  Home setup:   Steps into home Ramp . Floors in home:  #1.  Bed/bathroom on floor  1st.  Device  Hoverround, rollator  Social History     Socioeconomic History    Marital status:      Spouse name: Not on file    Number of children: Not on file    Years of education: Not on file    Highest education level: Not on file   Occupational History    Not on file   Social Needs    Financial resource strain: Not on file    Food insecurity:     Worry: Not on file     Inability: Not on file    Transportation needs:     Medical: Not on file     Non-medical: Not on file   Tobacco Use    Smoking status: Former Smoker     Types: Cigarettes    Smokeless tobacco: Former User     Quit date: 7/6/1986    Tobacco comment: stopped 30 years ago    Substance and Sexual Activity    Alcohol use: No    Drug use: No    Sexual activity: Not on file   Lifestyle    Physical activity:     Days per week: Not on file     Minutes per session: Not on file    Stress: Not on file   Relationships    Social connections:     Talks on phone: Not on file     Gets together: Not on file     Attends Jainism service: Not on file     Active member of club or organization: Not on file     Attends meetings of clubs or organizations: Not on file     Relationship status: Not on file    Intimate partner violence:     Fear of current or ex partner: Not on file     Emotionally abused: Not on file     Physically abused: Not on file     Forced sexual activity: Not on file   Other Topics Concern    Not on file   Social History Narrative    Not on file       Family History:       Problem Relation Age of Onset    Emphysema Mother     Heart Attack Father     Coronary Art Dis Father     High Blood Pressure Father        Radiology:  MRI OF THE BRAIN WITHOUT AND WITH CONTRAST  4/27/2019 11:14 pm       TECHNIQUE:   Multiplanar multisequence MRI of the head/brain was performed without and   with the administration of intravenous contrast.       COMPARISON:   None.       HISTORY:   ORDERING SYSTEM PROVIDED HISTORY: post TPA, stroke symptoms w 90% L ICA   stenosis.       FINDINGS:   Mild-to-moderate motion degradation challenge evaluation.       INTRACRANIAL STRUCTURES/VENTRICLES:  No evidence of restricted diffusion to   suggest acute territory infarction.  Moderate generalized involutional change   brain parenchyma identified with prominence of ventricles and sulci.    Moderate periventricular and subcortical T2 prolongation identified   suggestive chronic small vessel ischemic disease.  Minimal gliosis identified   along the right hand knob along the right precentral gyrus.  A few scattered   chronic lacune infarcts identified involving both frontal and parietal   centrum semiovale.  Major intracranial vascular flow voids are maintained. Subtle region grossly unremarkable.  No hemorrhagic transformation status   post tPA.       ORBITS: The visualized portion of the orbits demonstrate no acute abnormality.       SINUSES: Mild-to-moderate ethmoid sinus and maxillary sinus mucosal   thickening.       BONES/SOFT TISSUES: The bone marrow signal intensity appears normal. The soft   tissues demonstrate no acute abnormality.           Impression   Moderate chronic small ischemic disease and age related involutional change   without acute stroke, midline shift or mass effect.       No hemorrhagic transformation status post tPA. Diagnostics:    CBC:   Recent Labs     04/28/19  0856 04/29/19  0938 04/30/19  0502   WBC 10.1 9.6 10.9   RBC 4.36 4.35 4.06*   HGB 12.8* 12.9* 11.9*   HCT 39.8* 40.1* 37.6*   MCV 91.3 92.2 92.6   RDW 14.5* 14.1 14.0    260 222     BMP:   Recent Labs     04/28/19  0856 04/29/19  0938 04/30/19  0502    135 140   K 4.4 4.8 4.3    103 108*   CO2 21 21 20   BUN 12 15 15   CREATININE 1.16 1.13 1.16   GLUCOSE 146* 187* 143*      HbA1c:   Lab Results   Component Value Date    LABA1C 6.4 (H) 04/27/2019     BNP: No results for input(s): BNP in the last 72 hours. PT/INR: No results for input(s): PROTIME, INR in the last 72 hours. APTT: No results for input(s): APTT in the last 72 hours. CARDIAC ENZYMES: No results for input(s): CKMB, CKMBINDEX, TROPONINT in the last 72 hours. Invalid input(s): CKTOTAL;3  FASTING LIPID PANEL:  Lab Results   Component Value Date    CHOL 139 04/18/2019    HDL 55 04/18/2019    TRIG 104 04/18/2019     LIVER PROFILE: No results for input(s): AST, ALT, ALB, BILIDIR, BILITOT, ALKPHOS in the last 72 hours.        Physical Exam:  /64   Pulse 116   Temp 98.8 °F (37.1 °C) (Axillary)   Resp 27   Ht 6' 3\" (1.905 m)   Wt 217 lb (98.4 kg) Comment: from floor  SpO2 98%   BMI 27.12 kg/m²   GEN: Well developed, well nourished, no acute distress. HEENT: NCAT, PERRL, EOMI, mucous membranes pink and moist.  RESP: Lungs clear to auscultation. No rales or rhonchi. Respirations WNL and unlabored. CV: Heart regular rate rhythm. No murmurs. ABD: soft, non-distended, non-tender. BS+ and equal.  EXT: No calf tenderness or edema BLEs. SKIN: Warm dry and intact with good turgor. NEURO: A&O x 3. Sensation intact to light touch. DTRs 1+ RUE and RLE. CNs grossly intact  Motor: Functional ROM. Muscle tone and bulk are normal bilaterally. Strength 4/5 key muscles BUE and BLEs. PSYCH: Mood WNL. Affect flat. Appropriately interactive. Impression:  1. Ischemic CVA s/p tPA: L nondominant hemiparesis and dysarthria. On ASA, lipitor, plavix  2. DM II with hypoglycemia: on IV dextrose. Home lantus on hold. 3. COPD: has mucinex    Recommendations:    1. Diagnosis:  Ischemic CVA with R ICA stenosis s/p stent  2. Therapy: Has PT/OT/speech needs - await full evals  3. Medical Necessity: As above  4. Support: Lives with wife, established with home healthcare  5. Rehab Recommendation: Patient would likely benefit from inpatient acute rehabilitation. However, his wife is requesting he receive rehabilitation at [place] [address]. as it is within walking distance of her home and he is familiar with the staff there. She is concerned that he will be too confused if he is admitted elsewhere for rehabilitation. Advised patient and wife that he should be restricted from driving until cleared by a 's evaluation. He should follow up with PM&R 4-6 weeks after discharge from hospital.   6. DVT Prophylaxis: on Lovenox    It was my pleasure to evaluate Ron Knight today. Please call with questions.     Caleb Mauricio MD        This note is created with the assistance of a speech recognition program.  While intending to generate a document that actually

## 2019-04-30 NOTE — PLAN OF CARE
Problem: Falls - Risk of:  Goal: Will remain free from falls  Description  Will remain free from falls  4/30/2019 0653 by Demi Gamboa, RN  Outcome: Met This Shift  Note:   Fall risk precautions in place.  Bed in lowest position with wheels locked, bed alarm in place and activated, orange blanket on bed, non-skid socks on pt, fall risk id on pt, call light in reach, pt encouraged to call before getting out of bed and for any other needs or c/o.    4/29/2019 1824 by José Miguel Vasquez RN  Outcome: Ongoing

## 2019-04-30 NOTE — PROGRESS NOTES
75 mg daily - s/p load  2. Continue lipitor 40 mg daily  4. Keep BP  SBP  5.  Follow up with Dr. Maxine Willson 2 weeks and Dr. Shavon Dao in 3 months with CTA neck    Esha Larose MD  Stroke, [facility]  [address] [place]  Electronically signed 4/30/2019 at 8:19 AM

## 2019-04-30 NOTE — PROGRESS NOTES
Chronic pain  Pain Location: Back  Pain Orientation: Lower, Mid  Pain Descriptors: Aching, Sore, Discomfort  Pain Frequency: Continuous  Non-Pharmaceutical Pain Intervention(s): Distraction, Ambulation/Increased Activity, Therapeutic presence, Emotional support  Response to Pain Intervention: Patient Satisfied    Assessment:  Pt presents with mild-moderate cognitive deficits characterized by impaired orientation, immediate memory, short-term memory, verbal reasoning, sequencing, and safety/judgment. Pt was noted to have mild slurred speech during evaluation. ST to follow up and provide treatment to address noted deficits. Education provided. ST recommends therapy at this time. Recommendations:  Requires SLP Intervention: Yes  Duration/Frequency of Treatment: 3-5 x week  D/C Recommendations: Further therapy recommended at discharge. The patient should be able to tolerate at least three hours of therapy per day over 5 days or 15 hours over 7 days. Plan:   Goals:  Short-term Goals  Goal 1: Pt will recall 3-5 units with and without distraction with 90% accuracy. Goal 2: Pt will answer orientation questions with 90% accuracy. Goal 3: Pt will complete safety/judgment tasks with 90% accuracy. Goal 4: Pt will complete verbal reasoning tasks with 90% accuracy. Goal 5: Pt will complete verbal sequencing tasks with 90% accuracy. Goal 6: Pt will complete O/M exercises for dysarthria. Patient/family involved in developing goals and treatment plan: Yes    Subjective:   Previous level of function and limitations:  Independent  General  Chart Reviewed: Yes  Patient assessed for rehabilitation services?: Yes  Family / Caregiver Present: Yes  Social/Functional History  Lives With: Spouse  Vision  Vision: Within Functional Limits  Hearing  Hearing: Exceptions to [facility]  Hearing Exceptions: Hard of hearing/hearing concerns;Bilateral hearing aid(has HA, but does not wear them )    Objective:  Oral/Motor  Oral Motor: Within Functional Limits    Cognition:   Orientation  Overall Orientation Status: Impaired  Orientation Level: (disoriented to year and month)  Memory  Memory: Exceptions to [facility]  Short-term Memory: Severe(0/3, 0/3)  Working Memory: Mild(Immediate: 3/3, 3/5, 3/3)  Problem Solving  Problem Solving: Exceptions to [facility]  Verbal Reasoning Skills: Moderate(Deductive Reasonin/3)  Abstract Reasoning  Abstract Reasoning: Within Functional Limits  Safety/Judgement  Safety/Judgement: Exceptions to [facility]  Insight: Mild  (2/3)  Flexibility of Thought: Mild(2/3)  Word Associations: WFL  Sequencing: Moderate (3/5)    Prognosis:  Speech Therapy Prognosis  Prognosis: Good  Individuals consulted  Consulted and agree with results and recommendations: Patient    Education:  Patient Education: Yes  Patient Education Response: Verbalizes understanding       Therapy Time:   Individual Concurrent Group Co-treatment   Time In 31-70-28-28         Time Out 7027         Minutes 13           Completed by Marisol Ozuna,  Clinician  Co-signed by Vignesh Sanon A.CCC/SLP       2019 11:41 AM

## 2019-04-30 NOTE — PROGRESS NOTES
Occupational Therapy   Occupational Therapy Initial Assessment  Date: 2019   Patient Name: Guilherme Kaplan  MRN: [id number]     : 3/12/1931    Date of Service: 2019    Discharge Recommendations: Further therapy recommended at discharge. The patient should be able to tolerate at least three hours of therapy per day over 5 days or 15 hours over 7 days. OT Equipment Recommendations  Equipment Needed: No    Assessment   Performance deficits / Impairments: Decreased functional mobility ; Decreased high-level IADLs;Decreased ADL status; Decreased balance;Decreased endurance;Decreased strength;Decreased safe awareness  Prognosis: Good  Decision Making: Medium Complexity  Patient Education: Safety awareness, OTPOC, RW/transfer tech's-fair return  REQUIRES OT FOLLOW UP: Yes  Activity Tolerance  Activity Tolerance: Patient Tolerated treatment well;Patient limited by fatigue;Patient limited by pain  Safety Devices  Safety Devices in place: Yes  Type of devices: All fall risk precautions in place; Left in chair;Call light within reach; Chair alarm in place;Nurse notified;Gait belt  Restraints  Initially in place: No         Patient Diagnosis(es): There were no encounter diagnoses. has a past medical history of Arthritis, Asthma, Cystitis, Diabetic neuropathy ([address] Diarrhea, Dizziness, GERD (gastroesophageal reflux disease), H/O acute bronchitis, [place] (hard of hearing), Hx-TIA (transient ischemic attack), Hypertension, Kyphosis of thoracolumbar region, Neurogenic claudication, Numbness and tingling, Onychomycosis, Prolonged emergence from general anesthesia, Spinal stenosis of lumbar region, Spondylolysis of lumbar region, Type II or unspecified type diabetes mellitus without mention of complication, not stated as uncontrolled, and Wears glasses. has a past surgical history that includes Tonsillectomy; Cystocopy; Colonoscopy; Total knee arthroplasty (Left); Inguinal hernia repair (Bilateral);  Cataract removal with implant (Bilateral); Lumbar spine surgery; Revision total knee arthroplasty (Left); and Total hip arthroplasty (Right, 07/13/2016). Restrictions  Restrictions/Precautions  Restrictions/Precautions: General Precautions, Fall Risk, Up as Tolerated  Required Braces or Orthoses?: No  Position Activity Restriction  Other position/activity restrictions: SBP , TPA BR until OT/PT    Subjective   General  Patient assessed for rehabilitation services?: Yes  Family / Caregiver Present: Yes(Spouse)  Diagnosis: R ICA stenosis, stenting on 4/29  Pain Assessment  Pain Assessment: 0-10  Pain Level: 5  Pain Type: Chronic pain  Pain Location: Back  Pain Orientation: Lower;Mid  Pain Descriptors: Aching;Sore;Discomfort  Pain Frequency: Continuous  Non-Pharmaceutical Pain Intervention(s): Distraction; Ambulation/Increased Activity; Therapeutic presence; Emotional support  Response to Pain Intervention: Patient Satisfied  Social/Functional History  Social/Functional History  Lives With: Spouse  Type of Home: House  Home Layout: One level  Home Access: Ramped entrance  Bathroom Shower/Tub: Walk-in shower  Bathroom Toilet: Standard  Bathroom Equipment: Shower chair  Home Equipment: 4 wheeled walker(and a Hoverround for community distances, doesnt use often as pt had a bad experience with it in fall)  Receives Help From: Family  ADL Assistance: Needs assistance(Aide assists with bathing, pt performs own medication management and dressing, etc. per wife)  Homemaking Assistance: Needs assistance(HHA and wife perform nearly all)  Homemaking Responsibilities: No  Ambulation Assistance: Independent(using 4 ww)  Transfer Assistance: Independent  Active : Yes  Patient's  Info: Per wife pt \"not the best \", spouse does not drive  Mode of Transportation: SUV  Occupation: Retired  Leisure & Hobbies: Pt reports that \"can't do much anymore\"  Additional Comments: Home Aide, RN, and PT x1 weekly     Objective   Vision: sitting in recliner with alarm on upon arrival/exit  Transfers  Sit to stand: Maximum assistance  Stand to sit: Moderate assistance     Cognition  Overall Cognitive Status: Exceptions  Following Commands: Follows one step commands consistently; Follows multistep commands with increased time; Follows multistep commands with repitition  Attention Span: Difficulty attending to directions  Safety Judgement: Decreased awareness of need for assistance  Problem Solving: Assistance required to generate solutions  Initiation: Requires cues for some  Sequencing: Requires cues for some  Cognition Comment: Pt slow to respond at times, when using electric razor pt kept going over chin for longer than necessary before writer provided vc's to move toother areas     Sensation  Overall Sensation Status: WFL     LUE AROM (degrees)  LUE AROM : Exceptions  L Shoulder Flexion 0-180: Limited to 100 degrees at baseline  RUE AROM (degrees)  RUE AROM : Exceptions  R Shoulder Flexion 0-180: Limited to 100 degrees at baseline  LUE Strength  Gross LUE Strength: Exceptions to [facility]  L Shoulder Flex: 3-/5  L Elbow Flex: 4+/5  L Elbow Ext: 4+/5  L Hand Grasp: 4+/5  L Hand Release: 4+/5  RUE Strength  Gross RUE Strength: Exceptions to [facility]  R Shoulder Flex: 3-/5  R Elbow Flex: 4+/5  R Elbow Ext: 4+/5  R Hand Grasp: 4+/5  R Hand Release: 4+/5      Plan   Plan  Times per week: 4-5x  Current Treatment Recommendations: Balance Training, Functional Mobility Training, Endurance Training, Equipment Evaluation, Education, & procurement, Home Management Training, Patient/Caregiver Education & Training, Self-Care / ADL, Safety Education & Training, Pain Management, Strengthening    Goals  Short term goals  Time Frame for Short term goals: Pt will by discharge   Short term goal 1: demo ADL UB/LB dressing activity seated with setup and CGA only  Short term goal 2: demo good safety awareness during func mob around room using RW and min A with no vc's  Short term

## 2019-04-30 NOTE — PLAN OF CARE
Fall risk precautions in place. Bed in lowest position with wheels locked, bed alarm in place and activated, non skid socks on, and call light in reach. Pt encouraged to call out before getting out of bed or for any other needs or complaints.  Seven Winchester RN

## 2019-05-01 VITALS
RESPIRATION RATE: 21 BRPM | HEIGHT: 75 IN | SYSTOLIC BLOOD PRESSURE: 148 MMHG | DIASTOLIC BLOOD PRESSURE: 56 MMHG | OXYGEN SATURATION: 99 % | HEART RATE: 66 BPM | TEMPERATURE: 98.3 F | BODY MASS INDEX: 26.98 KG/M2 | WEIGHT: 217 LBS

## 2019-05-01 LAB
ABSOLUTE EOS #: 0.78 K/UL (ref 0–0.44)
ABSOLUTE IMMATURE GRANULOCYTE: 0.04 K/UL (ref 0–0.3)
ABSOLUTE LYMPH #: 2.45 K/UL (ref 1.1–3.7)
ABSOLUTE MONO #: 1.02 K/UL (ref 0.1–1.2)
ANION GAP SERPL CALCULATED.3IONS-SCNC: 11 MMOL/L (ref 9–17)
BASOPHILS # BLD: 1 % (ref 0–2)
BASOPHILS ABSOLUTE: 0.06 K/UL (ref 0–0.2)
BUN BLDV-MCNC: 19 MG/DL (ref 8–23)
BUN/CREAT BLD: ABNORMAL (ref 9–20)
CALCIUM SERPL-MCNC: 8.5 MG/DL (ref 8.6–10.4)
CHLORIDE BLD-SCNC: 106 MMOL/L (ref 98–107)
CO2: 21 MMOL/L (ref 20–31)
CREAT SERPL-MCNC: 1.24 MG/DL (ref 0.7–1.2)
DIFFERENTIAL TYPE: ABNORMAL
EOSINOPHILS RELATIVE PERCENT: 8 % (ref 1–4)
GFR AFRICAN AMERICAN: >60 ML/MIN
GFR NON-AFRICAN AMERICAN: 55 ML/MIN
GFR SERPL CREATININE-BSD FRML MDRD: ABNORMAL ML/MIN/{1.73_M2}
GFR SERPL CREATININE-BSD FRML MDRD: ABNORMAL ML/MIN/{1.73_M2}
GLUCOSE BLD-MCNC: 149 MG/DL (ref 70–99)
GLUCOSE BLD-MCNC: 208 MG/DL (ref 75–110)
GLUCOSE BLD-MCNC: 99 MG/DL (ref 75–110)
HCT VFR BLD CALC: 35.7 % (ref 40.7–50.3)
HEMOGLOBIN: 11.2 G/DL (ref 13–17)
IMMATURE GRANULOCYTES: 0 %
LYMPHOCYTES # BLD: 24 % (ref 24–43)
MCH RBC QN AUTO: 29.3 PG (ref 25.2–33.5)
MCHC RBC AUTO-ENTMCNC: 31.4 G/DL (ref 28.4–34.8)
MCV RBC AUTO: 93.5 FL (ref 82.6–102.9)
MONOCYTES # BLD: 10 % (ref 3–12)
NRBC AUTOMATED: 0 PER 100 WBC
PDW BLD-RTO: 14.2 % (ref 11.8–14.4)
PLATELET # BLD: 226 K/UL (ref 138–453)
PLATELET ESTIMATE: ABNORMAL
PMV BLD AUTO: 9.8 FL (ref 8.1–13.5)
POTASSIUM SERPL-SCNC: 4.1 MMOL/L (ref 3.7–5.3)
RBC # BLD: 3.82 M/UL (ref 4.21–5.77)
RBC # BLD: ABNORMAL 10*6/UL
SEG NEUTROPHILS: 57 % (ref 36–65)
SEGMENTED NEUTROPHILS ABSOLUTE COUNT: 5.72 K/UL (ref 1.5–8.1)
SODIUM BLD-SCNC: 138 MMOL/L (ref 135–144)
WBC # BLD: 10.1 K/UL (ref 3.5–11.3)
WBC # BLD: ABNORMAL 10*3/UL

## 2019-05-01 PROCEDURE — 97127 HC SP THER IVNTJ W/FOCUS COG FUNCJ: CPT

## 2019-05-01 PROCEDURE — 80048 BASIC METABOLIC PNL TOTAL CA: CPT

## 2019-05-01 PROCEDURE — 99232 SBSQ HOSP IP/OBS MODERATE 35: CPT | Performed by: PSYCHIATRY & NEUROLOGY

## 2019-05-01 PROCEDURE — 51798 US URINE CAPACITY MEASURE: CPT

## 2019-05-01 PROCEDURE — 36415 COLL VENOUS BLD VENIPUNCTURE: CPT

## 2019-05-01 PROCEDURE — 6370000000 HC RX 637 (ALT 250 FOR IP): Performed by: STUDENT IN AN ORGANIZED HEALTH CARE EDUCATION/TRAINING PROGRAM

## 2019-05-01 PROCEDURE — 2500000003 HC RX 250 WO HCPCS: Performed by: STUDENT IN AN ORGANIZED HEALTH CARE EDUCATION/TRAINING PROGRAM

## 2019-05-01 PROCEDURE — 92507 TX SP LANG VOICE COMM INDIV: CPT

## 2019-05-01 PROCEDURE — 51702 INSERT TEMP BLADDER CATH: CPT

## 2019-05-01 PROCEDURE — 94762 N-INVAS EAR/PLS OXIMTRY CONT: CPT

## 2019-05-01 PROCEDURE — 6360000002 HC RX W HCPCS: Performed by: STUDENT IN AN ORGANIZED HEALTH CARE EDUCATION/TRAINING PROGRAM

## 2019-05-01 PROCEDURE — 51701 INSERT BLADDER CATHETER: CPT

## 2019-05-01 PROCEDURE — 6370000000 HC RX 637 (ALT 250 FOR IP): Performed by: PHYSICIAN ASSISTANT

## 2019-05-01 PROCEDURE — 82947 ASSAY GLUCOSE BLOOD QUANT: CPT

## 2019-05-01 PROCEDURE — 2580000003 HC RX 258: Performed by: STUDENT IN AN ORGANIZED HEALTH CARE EDUCATION/TRAINING PROGRAM

## 2019-05-01 PROCEDURE — 99232 SBSQ HOSP IP/OBS MODERATE 35: CPT | Performed by: INTERNAL MEDICINE

## 2019-05-01 PROCEDURE — 95819 EEG AWAKE AND ASLEEP: CPT | Performed by: PSYCHIATRY & NEUROLOGY

## 2019-05-01 PROCEDURE — 85025 COMPLETE CBC W/AUTO DIFF WBC: CPT

## 2019-05-01 PROCEDURE — 95819 EEG AWAKE AND ASLEEP: CPT

## 2019-05-01 RX ORDER — CLOPIDOGREL BISULFATE 75 MG/1
75 TABLET ORAL DAILY
Qty: 30 TABLET | Refills: 3 | Status: SHIPPED | OUTPATIENT
Start: 2019-05-02

## 2019-05-01 RX ORDER — ASPIRIN 81 MG/1
81 TABLET ORAL DAILY
Qty: 30 TABLET | Refills: 3 | Status: SHIPPED | OUTPATIENT
Start: 2019-05-02

## 2019-05-01 RX ORDER — TAMSULOSIN HYDROCHLORIDE 0.4 MG/1
0.4 CAPSULE ORAL DAILY
Status: DISCONTINUED | OUTPATIENT
Start: 2019-05-01 | End: 2019-05-01 | Stop reason: HOSPADM

## 2019-05-01 RX ORDER — METOPROLOL TARTRATE 50 MG/1
50 TABLET, FILM COATED ORAL DAILY
Status: DISCONTINUED | OUTPATIENT
Start: 2019-05-01 | End: 2019-05-01

## 2019-05-01 RX ADMIN — LIDOCAINE HYDROCHLORIDE: 20 JELLY TOPICAL at 13:30

## 2019-05-01 RX ADMIN — SODIUM CHLORIDE: 9 INJECTION, SOLUTION INTRAVENOUS at 09:52

## 2019-05-01 RX ADMIN — CLOPIDOGREL 75 MG: 75 TABLET, FILM COATED ORAL at 09:33

## 2019-05-01 RX ADMIN — CARBIDOPA AND LEVODOPA 1 TABLET: 25; 100 TABLET ORAL at 09:33

## 2019-05-01 RX ADMIN — CARBIDOPA AND LEVODOPA 1 TABLET: 25; 100 TABLET ORAL at 13:37

## 2019-05-01 RX ADMIN — ENOXAPARIN SODIUM 40 MG: 40 INJECTION, SOLUTION INTRAVENOUS; SUBCUTANEOUS at 09:35

## 2019-05-01 RX ADMIN — ASPIRIN 81 MG: 81 TABLET ORAL at 09:34

## 2019-05-01 RX ADMIN — INSULIN LISPRO 3 UNITS: 100 INJECTION, SOLUTION INTRAVENOUS; SUBCUTANEOUS at 13:31

## 2019-05-01 RX ADMIN — Medication 10 ML: at 09:35

## 2019-05-01 RX ADMIN — METOPROLOL TARTRATE 25 MG: 25 TABLET ORAL at 09:33

## 2019-05-01 RX ADMIN — INSULIN LISPRO 2 UNITS: 100 INJECTION, SOLUTION INTRAVENOUS; SUBCUTANEOUS at 09:41

## 2019-05-01 RX ADMIN — MICONAZOLE NITRATE: 20.6 POWDER TOPICAL at 10:10

## 2019-05-01 RX ADMIN — INSULIN LISPRO 4 UNITS: 100 INJECTION, SOLUTION INTRAVENOUS; SUBCUTANEOUS at 13:30

## 2019-05-01 RX ADMIN — TAMSULOSIN HYDROCHLORIDE 0.4 MG: 0.4 CAPSULE ORAL at 16:14

## 2019-05-01 RX ADMIN — MUPIROCIN: 20 OINTMENT TOPICAL at 09:34

## 2019-05-01 NOTE — PROCEDURES
[address]  [facility]      ELECTROENCEPHALOGRAM REPORT        REFERRING PHYSICIAN:  Heraclio Oneal MD  PATIENT NAME:Angel Bruner  PATIENT MRN: [id number]  DATE OF EE2019    BRIEF HISTORY:  80year old male presented with episode of confusion, EEG to evaluate. CURRENT ANTI-EPILEPTIC MEDICATIONS:  None    EEG DESCRIPTION:   During maximal wakefulness, the background was continuous but mildly to moderately slow consisting of delta and theta activity in 2-7Hz, ranging between 10-50 uV. There was a posterior dominant rhythm at 6-7 Hz. Spontaneous variability and reactivity to stimulation were present. There was intermittent slow, generalized, at times rhythmic, also regional in both tempral/central regions in delta and theta frequency of 2-5Hz, lasted 1-2 seconds. No epileptiform discharges or seizures were recorded. Stage I of rolling eye movement, stage II sleep, characterized by K complex were present. Photic stimulation did not activate abnormal activity, hyperventilation was not performed. Heart rate was regular at ~60 beats per minute on a single lead EKG. CLASSIFICATION   Abnormal significant II (Awake, asleep)  1. Intermittent slow, generalized  2. Intermittent rhythmic slow, generalized  3. Background slow      IMPRESSION:   This was an abnormal routine awake and asleep EEG, which showed evidence of mild to moderate diffuse encephalopathy and bilateral multiple region mild focal cortical dysfunction, not specific to etiology. There were epileptiform discharges or EEG seizures on this recording.      Nkechi Munguia MD, [address] [place] [address], Neurology  Board Certified Epileptologist

## 2019-05-01 NOTE — PROGRESS NOTES
Asthma     Cystitis     Diabetic neuropathy (HCC)     Diarrhea     Dizziness     GERD (gastroesophageal reflux disease)     H/O acute bronchitis     [place] (hard of hearing)     Hx-TIA (transient ischemic attack)     h/o 4,  2009, 2012, 2014, 2015    Hypertension     Kyphosis of thoracolumbar region     Neurogenic claudication     Numbness and tingling     feet    Onychomycosis     Prolonged emergence from general anesthesia     Spinal stenosis of lumbar region     Spondylolysis of lumbar region     Type II or unspecified type diabetes mellitus without mention of complication, not stated as uncontrolled     Wears glasses        Past Surgical History:   Procedure Laterality Date    CATARACT REMOVAL WITH IMPLANT Bilateral     COLONOSCOPY      CYSTOSCOPY      INGUINAL HERNIA REPAIR Bilateral     LUMBAR SPINE SURGERY      lumbar-6 screws and 2 bars    REVISION TOTAL KNEE ARTHROPLASTY Left     total of 3 d/t infections    TONSILLECTOMY      TOTAL HIP ARTHROPLASTY Right 07/13/2016    TOTAL KNEE ARTHROPLASTY Left     , 4 total surgeries, infection, I&D, spacer placed and removed       Medications:     enoxaparin  40 mg Subcutaneous Daily    miconazole   Topical BID    carbidopa-levodopa  1 tablet Oral TID    insulin glargine  10 Units Subcutaneous Nightly    insulin lispro  5 Units Subcutaneous TID WC    insulin lispro  0-12 Units Subcutaneous TID WC    insulin lispro  0-6 Units Subcutaneous Nightly    mupirocin   Topical Daily    senna  2 tablet Oral Nightly    QUEtiapine  25 mg Oral Nightly    sodium chloride flush  10 mL Intravenous 2 times per day    atorvastatin  40 mg Oral Nightly    aspirin  325 mg Oral Once    clopidogrel  300 mg Oral Once    aspirin  81 mg Oral Daily    clopidogrel  75 mg Oral Daily     PRN Meds include: acetaminophen, bisacodyl, guaiFENesin, fentanNYL, hydrALAZINE, ziprasidone **AND** sterile water, sodium chloride flush, magnesium hydroxide, ondansetron, glucose, dextrose, glucagon (rDNA), dextrose, sodium chloride flush    Objective:   BP (!) 183/71   Pulse 75   Temp 97.2 °F (36.2 °C)   Resp 20   Ht 6' 3\" (1.905 m)   Wt 217 lb (98.4 kg) Comment: from floor  SpO2 99%   BMI 27.12 kg/m²     Blood pressure range: Systolic (88TQZ), OJI:519 , Min:101 , OCU:117   ; Diastolic (94TIB), EGY:86, Min:51, Max:78      ROS:  CONSTITUTIONAL: negative for fatigue and malaise   EYES: negative for double vision and photophobia    HEENT: negative for tinnitus and sore throat   RESPIRATORY: negative for cough, shortness of breath   CARDIOVASCULAR: negative for chest pain, palpitations, or syncope   GASTROINTESTINAL: negative for abdominal pain, nausea, vomiting, diarrhea, or constipation    GENITOURINARY: negative for incontinence or retention    MUSCULOSKELETAL: negative for neck or back pain, negative for extremity pain   NEUROLOGICAL: Negative for seizures, headaches, weakness, numbness, confusion, aphasia, dysarthria   PSYCHIATRIC: negative for agitation, hallucination, SI/HI   SKIN Negative for spontaneous contusions, rashes, or lesions        NEUROLOGIC EXAMINATION  GENERAL  Appears comfortable and in no distress   HEENT  NC/ AT   HEART  S1 and S2 heard; palpation of pulses: radial pulse    NECK  Supple and no bruits heard   MENTAL STATUS:  Alert, oriented, intact memory, no confusion, normal speech, normal language, no hallucination or delusion   CRANIAL NERVES: II     -      Visual fields intact to confrontation  III,IV,VI -  PERR, EOMs full, no ptosis  V     -     Normal facial sensation   VII    -     Normal facial symmetry  VIII   -     Intact hearing   IX,X -     Symmetrical palate  XI    -     Symmetrical shoulder shrug  XII   -     Midline tongue, no atrophy    MOTOR FUNCTION: RUE: Significant for good strength of grade 5/5 in proximal and distal muscle groups   LUE: Significant for good strength of grade 5/5 in proximal and distal muscle groups   RLE: Significant for good strength of grade 5/5 in proximal and distal muscle groups   LLE: Significant for good strength of grade 5/5 in proximal and distal muscle groups      Normal bulk, normal tone and no involuntary movements, no tremor   SENSORY FUNCTION:  Normal touch, normal pin, normal vibration, normal proprioception   CEREBELLAR FUNCTION:  Intact fine motor control over upper limbs and lower limbs   REFLEX FUNCTION:  Symmetric in upper and lower extremities, no Babinski sign   STATION and GAIT  Normal gait and tandem station, normal tip toes and heel walking     Data:    Lab Results:   CBC:   Recent Labs     04/29/19 0938 04/30/19  0502 05/01/19  0605   WBC 9.6 10.9 10.1   HGB 12.9* 11.9* 11.2*    222 226     BMP:    Recent Labs     04/29/19 0938 04/30/19  0502 05/01/19  0605    140 138   K 4.8 4.3 4.1    108* 106   CO2 21 20 21   BUN 15 15 19   CREATININE 1.13 1.16 1.24*   GLUCOSE 187* 143* 149*         Lab Results   Component Value Date    CHOL 139 04/18/2019    LDLCHOLESTEROL 63 04/18/2019    HDL 55 04/18/2019    TRIG 104 04/18/2019    ALT 13 04/18/2019    AST 21 04/18/2019    TSH 5.92 (H) 04/18/2019    INR 1.0 04/26/2019    LABA1C 6.4 (H) 04/27/2019       No results found for: PHENYTOIN, PHENYTOIN, VALPROATE, CBMZ    IMAGING    MRI Brain W WO   Impression   Moderate chronic small ischemic disease and age related involutional change   without acute stroke, midline shift or mass effect.       No hemorrhagic transformation status post tPA. CTA Head and Neck W  90% or greater subtotal stenosis identified right proximal ICA level carotid   bifurcation.       No hemodynamic stenosis or occlusion identified involving intracranial   arterial circulation         ASSESSMENT     Case of a 81 y/o M s/p R ICA critical stenosis, stent placed  POD#2.      PLAN  Awaiting EEG  Per Endovascular note, maintain SBP   Resume home dose of Metoprolol 50 qD  Continue ASA/Plavix  PT/OT  Will need to f/u o/p with

## 2019-05-01 NOTE — PLAN OF CARE
Fall risk precautions in place. Bed in lowest position with wheels locked, bed alarm in place and activated, non skid socks on, and call light in reach. Pt encouraged to call out before getting out of bed or for any other needs or complaints.  Elmer Shin RN

## 2019-05-01 NOTE — DISCHARGE SUMMARY
[facility]  [address] [place] [address]    Discharge Summary     Patient ID: Cirilo Ramirez  :  3/12/1931   MRN: [id number]     ACCOUNT:  [de-identified]   Patient's PCP: Jose Love DO  Admit Date: 2019   Discharge Date: 2019     Length of Stay: 4  Code Status:  Full Code  Admitting Physician: No admitting provider for patient encounter. Discharge Physician: Tierney Knott     Active Discharge Diagnoses:       Primary Problem  Internal carotid artery stenosis, right      Hospital Problems  Active Hospital Problems    Diagnosis Date Noted    Carotid stenosis, symptomatic w/o infarct, right [I65.21]     Tissue plasminogen activator (t-PA) administered at other facility within 24 hours prior to current admission [Z92.82]     Hypoglycemia [E16.2]     History of diabetes mellitus [Z86.39]     Internal carotid artery stenosis, right [I65.21] 2018       Admission Condition:  serious     Discharged Condition: good    Hospital Stay:       Hospital Course:  Cirilo Ramirez is a 80 y.o. male who was admitted for the management of Internal carotid artery stenosis, right , presented to ER with AMS No chief complaint on file. Patient presented  to [facility] with AMS with NIHSS of 4. tPa was given after CTA showed 90% R ICA stenosis. He was transferred to [facility] to undergo stent placement by the endovascular service. He also had a BS of 21 and was given ampule of D50. He has a history of \"mumbling spells\" since  (x5) that get better within minutes. SBP was initially elevated, endovascular service recommended SBP , and home metoprolol was resumed on .        Significant therapeutic interventions: tPa, R ICA stent    Significant Diagnostic Studies:   Labs / Micro:  CBC:   Lab Results   Component Value Date    WBC 10.1 2019    RBC 3.82 2019    RBC 3.16 2012    HGB 11.2 2019    HCT 35.7 2019    MCV 93.5 2019 MCH 29.3 05/01/2019    MCHC 31.4 05/01/2019    RDW 14.2 05/01/2019     05/01/2019     04/28/2012     BMP:    Lab Results   Component Value Date    GLUCOSE 149 05/01/2019    GLUCOSE 145 04/22/2012     05/01/2019    K 4.1 05/01/2019     05/01/2019    CO2 21 05/01/2019    ANIONGAP 11 05/01/2019    BUN 19 05/01/2019    CREATININE 1.24 05/01/2019    BUNCRER NOT REPORTED 05/01/2019    CALCIUM 8.5 05/01/2019    LABGLOM 55 05/01/2019    GFRAA >60 05/01/2019    GFR      05/01/2019    GFR NOT REPORTED 05/01/2019     PT/INR:  No results found for: PTINR  PTT:   Lab Results   Component Value Date    APTT 30.2 04/26/2019     U/A:    Lab Results   Component Value Date    COLORU YELLOW 04/26/2019    TURBIDITY CLEAR 04/26/2019    SPECGRAV 1.022 04/26/2019    HGBUR NEGATIVE 04/26/2019    PHUR 5.5 04/26/2019    PROTEINU NEGATIVE 04/26/2019    GLUCOSEU NEGATIVE 04/26/2019    GLUCOSEU NEGATIVE 04/18/2012    KETUA NEGATIVE 04/26/2019    BILIRUBINUR NEGATIVE 04/26/2019    BILIRUBINUR NEGATIVE 04/18/2012    UROBILINOGEN Normal 04/26/2019    NITRU NEGATIVE 04/26/2019    LEUKOCYTESUR NEGATIVE 04/26/2019       Radiology:    Xr Chest Standard (2 Vw)    Result Date: 4/18/2019  EXAMINATION: TWO VIEWS OF THE CHEST 4/18/2019 4:51 pm COMPARISON: Chest x-ray 08/04/2018 HISTORY: ORDERING SYSTEM PROVIDED HISTORY: Chronic obstructive pulmonary disease, unspecified COPD type ([facility] [address] TECHNOLOGIST PROVIDED HISTORY: Ordering Physician Provided Reason for Exam: Shortness of breath Acuity: Acute Type of Exam: Initial Relevant Medical/Surgical History: COPD FINDINGS: Chronic lung changes are present. No new parenchymal opacity. Costophrenic angles are clear. Cardiac and mediastinal structures are unchanged. The bones are unchanged. Chronic lung changes. No acute abnormality.      Xr Abdomen (kub) (single Ap View)    Result Date: 4/27/2019  EXAMINATION: SINGLE XRAY VIEW OF THE CHEST; SINGLE SUPINE XRAY VIEW(S) OF THE ABDOMEN 4/27/2019 8:06 pm COMPARISON: Earlier today. HISTORY: ORDERING SYSTEM PROVIDED HISTORY: Lung sounds crackles TECHNOLOGIST PROVIDED HISTORY: Lung sounds crackles Ordering Physician Provided Reason for Exam: lung sounds crackles; ORDERING SYSTEM PROVIDED HISTORY: MRI clearance TECHNOLOGIST PROVIDED HISTORY: MRI clearance Ordering Physician Provided Reason for Exam: MRI clearance FINDINGS: Chest: Cardiac and mediastinal contours are stable. There is stable chronic reticular opacities in the lungs without consolidation or edema. No pneumothorax or pleural effusion is seen. No acute osseous abnormality is identified. There is no radiopaque foreign body. Abdomen: Nonspecific, nonobstructive bowel gas pattern. Contrast in the urinary bladder. No pathologic calcification. L4 through S1 posterior lumbar fusion hardware and right hip arthroplasty hardware are noted. No radiopaque foreign body is seen otherwise. 1. No acute cardiopulmonary abnormality. 2. No evidence of bowel obstruction. Ct Head Wo Contrast    Result Date: 4/27/2019  EXAMINATION: CT OF THE HEAD WITHOUT CONTRAST  4/27/2019 6:41 am TECHNIQUE: CT of the head was performed without the administration of intravenous contrast. Dose modulation, iterative reconstruction, and/or weight based adjustment of the mA/kV was utilized to reduce the radiation dose to as low as reasonably achievable. COMPARISON: 04/26/2019 HISTORY: ORDERING SYSTEM PROVIDED HISTORY: 12 hours s/p tPA administration TECHNOLOGIST PROVIDED HISTORY: Ordering Physician Provided Reason for Exam: 12 hours s/p tPA administration Acuity: Acute Type of Exam: Subsequent/Follow-up FINDINGS: BRAIN/VENTRICLES: There is prominence of the ventricles and cortical sulci consistent with cortical volume loss. No midline shift. Basal cisterns are normally outlined. There is no clear evidence for acute infarct. No acute intra or extra-axial hemorrhage. Internal carotid artery calcifications noted. ORBITS: The visualized portion of the orbits demonstrate no acute abnormality. SINUSES: The visualized paranasal sinuses and mastoid air cells demonstrate no acute abnormality. SOFT TISSUES/SKULL:  No acute abnormality of the visualized skull or soft tissues. 1. No acute intracranial hemorrhage. 2. Cerebral atrophy and chronic small vessel ischemic changes in the white matter. Ct Head Wo Contrast    Result Date: 4/26/2019  EXAMINATION: CT OF THE HEAD WITHOUT CONTRAST  4/26/2019 5:25 pm TECHNIQUE: CT of the head was performed without the administration of intravenous contrast. Dose modulation, iterative reconstruction, and/or weight based adjustment of the mA/kV was utilized to reduce the radiation dose to as low as reasonably achievable. COMPARISON: August 4, 2018 HISTORY: ORDERING SYSTEM PROVIDED HISTORY: STROKE TECHNOLOGIST PROVIDED HISTORY: FINDINGS: BRAIN/VENTRICLES: Ventricles sulci are stable. Artifact limits the posterior fossa. There is no mass effect on the 4th ventricle. Small calcifications in the posterior fossa are again noted. Multifocal low-density in the periventricular and subcortical white matter is noted bilaterally. Findings are slightly greater in the right frontal region extending posteriorly to the frontal parietal junction. There may be subtle extension to the right frontal cortex. No hyperdense arteries are noted. Vascular calcifications are noted. There is no hemorrhage ORBITS: The visualized portion of the orbits demonstrate no acute abnormality. SINUSES: The visualized paranasal sinuses and mastoid air cells demonstrate no acute abnormality. SOFT TISSUES/SKULL:  No acute abnormality of the visualized skull or soft tissues. Limitation due to artifact Multifocal small-vessel ischemic change bilaterally. Focal asymmetric low density in the right frontal subcortical white matter with questionable extension of the cortex. This was present on the prior.  Subtle increase would be difficult to exclude. If stroke symptoms continue consider MRI. Critical results were called by Dr. Nilda León to Isa Curran on 4/26/2019 at 17:41. Ir Angiogram Carotid C Erebral Bilateral    Result Date: 5/1/2019  Date of Service: 4/29/2019 Diagnosis: Symptomatic critical right ICA stenosis Patient arrived to the angio suite at: 1:45 pm Puncture obtained at: 2:45 pm Vascular access was removed at: 3:20 pm Procedure:  1. Transarterial right internal carotid artery balloon angioplasty and stenting for symptomatic stenosis 2. Selective right common carotid artery cerebral angiogram. 3.  Selective right common carotid artery cervical angiograms. 4.  Right common femoral artery angiogram 5. Continuous IA nicardipine infusion throughout the case to reduce the likelihood of spasm. Vessels catheterized: 1. Right common carotid artery 2. Right internal carotid artery 3. Right common femoral artery Neurointerventionalist: Jayant Rivera MD, MS. [place]land: Kathleen Buitrago Indication and Clinical History: 80years old male with PMH of DM and COPD who presented with acute left sided weakness and dysarthria for which he received tPA. During neurological work up, CTA neck concerning for severe GEM symptomatic stenosis measuring 90% stenosis for which he is presenting today for right ICA stenting. Contrast:  45 cc of Visipaque 270. Fluoroscopy time: 14.5 minutes Consent: After explaining the risks and benefits to the patient and the family including but not limited to stroke, death, visceral injury, dissection, tear, occlusion, x-ray dye allergic reaction, infection, consent form was obtained. Anesthesia:  MAC awake anesthesia Local Anesthesia with Lidocaine. Procedure and findings: The patient was brought to the interventional suite and placed in the supine position on the angio table. The right groin region was prepped, and cleaned in sterile fashion.   The right common femoral artery was accessed using a micro puncture kit and 6 Malagasy x 90 cm Brite tip introducer sheath was placed. 70 units/kg of Heparin was administered for a target ACT of 250-300. 10 mg of nicardipine was infused through the Shuttle sheath to reduce the likelihood of spasm. A select catheter was advanced inside the 90cm sheath, under fluoroscopic guidance into the right common carotid artery and images were obtained in biplane projection of the cervical carotid. The 6F long sheath was advanced over the catheter into the distal right common carotid artery under fluoroscopic guidance and the select catheter was removed. After measuring the carotid artery distal to the stenosis, an Accunet was advanced through the high grade stenosis and deployed for distal embolic protection. Subsequently; a 3 mm x 20 mm Stewart balloon was advanced and inflated across the stenotic lesion with good result and improvement in the luminal narrowing post angioplasty. The Wallstent was then advanced across the stenotic lesion and deployed successfully. Postplasty was performed using 5 x 20 mm Stewart balloon. Final images were reviewed. The follow-up post procedure common carotid artery cerebral angiogram showed no evidence of distal embolization. The 6 [place] shuttle sheath was then repositioned within the right external iliac artery, and digital subtraction angiography of the right common femoral artery was performed in a frontal oblique projection. Arterial phase images were unremarkable, with no evidence of arterial stenosis, dissection, or pseudoaneurysm. The arterial puncture site was well placed above the femoral bifurcation and below the inguinal ligament. Therefore, the 6 Malagasy sheath was removed, and adequate hemostasis was achieved using a 6F Angioseal followed by 10 minutes of manual compression. Disposition:  The patient was transported out of the angio suite in stable and hemodynamic condition with no change in head neurological status.  Complications: None. Findings: The right carotid artery injection demonstrated atherosclerotic changes in the proximal cervical ICA resulting into a focal area of stenosis, 2-3 cm from the ICA origin measuring approximately 90% per NASCET criteria. Right Carotid Stent and Balloon Angioplasty: The high grade stenotic segment was crossed using the Accunet without difficulty. The stenotic lesion was subsequently dilated using 3 x 20 mm  Stewart angioplasty balloon, which was inflated to 8 atmospheres of pressure. After angioplasty; the Wallstent 10 mm x 31 mm stent was advanced and deployed successfully across the lesion. Post stent placement angioplasty was performed using 5 x 20 mm Stewart which was inflated to 14 atmospheres of pressure with good results. The stenosis was decreased  from 90% to less than 10% after the stenting and post balloon angioplasty. No distal embolization is noted on the right common carotid artery cerebral angiograms. Impression: Right cervical ICA critical and symptomatic stenosis measuring approximately 90% per NASCET criteria treated with balloon angioplasty and stenting as described above with < 10% residual stenosis. Dr Arthur Corrales dictated this invasive procedure. Dr César Adler was present for all procedural and imaging components of this case. Examination was reviewed and reported findings confirmed and evaluated by Dr. César Adler. Final report electronically signed by Heydi Gonzalez M.D. on 5/1/2019 2:52 PM    Xr Chest Portable    Result Date: 4/27/2019  EXAMINATION: SINGLE XRAY VIEW OF THE CHEST; SINGLE SUPINE XRAY VIEW(S) OF THE ABDOMEN 4/27/2019 8:06 pm COMPARISON: Earlier today.  HISTORY: ORDERING SYSTEM PROVIDED HISTORY: Lung sounds crackles TECHNOLOGIST PROVIDED HISTORY: Lung sounds crackles Ordering Physician Provided Reason for Exam: lung sounds crackles; ORDERING SYSTEM PROVIDED HISTORY: MRI clearance TECHNOLOGIST PROVIDED HISTORY: MRI clearance Ordering Physician Provided Reason for Exam: MRI clearance FINDINGS: Chest: Cardiac and mediastinal contours are stable. There is stable chronic reticular opacities in the lungs without consolidation or edema. No pneumothorax or pleural effusion is seen. No acute osseous abnormality is identified. There is no radiopaque foreign body. Abdomen: Nonspecific, nonobstructive bowel gas pattern. Contrast in the urinary bladder. No pathologic calcification. L4 through S1 posterior lumbar fusion hardware and right hip arthroplasty hardware are noted. No radiopaque foreign body is seen otherwise. 1. No acute cardiopulmonary abnormality. 2. No evidence of bowel obstruction. Xr Chest Portable    Result Date: 4/27/2019  EXAMINATION: SINGLE XRAY VIEW OF THE CHEST 4/27/2019 9:40 am COMPARISON: 04/18/2019 HISTORY: ORDERING SYSTEM PROVIDED HISTORY: eval lungs TECHNOLOGIST PROVIDED HISTORY: eval lungs FINDINGS: The heart and mediastinal structures are stable. Increased interstitial markings are present compatible with chronic interstitial lung disease no new pulmonary process is seen. The appearance of lungs are similar to the study from 07/06/2018. Stable chest chronic interstitial lung disease. Cta Neck W Contrast    Result Date: 4/27/2019  EXAMINATION: CTA OF THE NECK; CTA OF THE HEAD WITH CONTRAST 4/27/2019 12:07 am: TECHNIQUE: CTA of the neck was performed with the administration of intravenous contrast. Multiplanar reformatted images are provided for review. MIP images are provided for review. Stenosis of the internal carotid arteries measured using NASCET criteria. Dose modulation, iterative reconstruction, and/or weight based adjustment of the mA/kV was utilized to reduce the radiation dose to as low as reasonably achievable.; CTA of the head/brain was performed with the administration of intravenous contrast. Multiplanar reformatted images are provided for review. MIP images are provided for review.  Dose modulation, iterative reconstruction, and/or weight based adjustment of the mA/kV was utilized to reduce the radiation dose to as low as reasonably achievable. 3D reconstructed images were performed on a separate workstation and provided for review. COMPARISON: CT neck 07/08/2008 CT head 04/26/2019 HISTORY: ORDERING SYSTEM PROVIDED HISTORY: stroke like symptoms, NIH 4 TECHNOLOGIST PROVIDED HISTORY: Ordering Physician Provided Reason for Exam: stroke-like symptoms Acuity: Acute Type of Exam: Unknown FINDINGS: CTA NECK: AORTIC ARCH/ARCH VESSELS: There is a normal branch pattern of the aortic arch. No significant stenosis is seen of the innominate artery or subclavian arteries. CAROTID ARTERIES: The common carotid arteries demonstrate mild atherosclerotic changes without evidence of a flow limiting stenosis. Heavy atherosclerotic plaque identified right carotid bifurcation extending into the right proximal ICA resulting in subtotal, greater than 90% stenosis right proximal ICA per NASCET criteria. .  The left internal carotid artery demonstrates mild plaque left carotid bifurcation. Normal in appearance without evidence of a flow limiting stenosis by NASCET criteria. No dissection or arterial injury is seen. VERTEBRAL ARTERIES: The vertebral arteries both arise from the subclavian arteries and are normal in caliber without evidence of flow limiting stenosis or dissection. SOFT TISSUES: The lung apices are clear. No cervical or superior mediastinal lymphadenopathy. The visualized portion of the larynx and pharynx appear unremarkable. The parotid, submandibular and thyroid glands demonstrate no acute abnormality. BONES: The visualized osseous structures demonstrate multilevel degenerative changes. There subpleural areas of interstitial thickening suggestive underlying airspace disease involving both lung apices. . CTA HEAD: ANTERIOR CIRCULATION: Moderate plaque identified involving the cavernous and supraclinoid ICAs resulting in up to 40-50% stenosis involving the proximal cavernous segments. The anterior cerebral and middle cerebral arteries demonstrate no focal stenosis. POSTERIOR CIRCULATION: The posterior cerebral arteries demonstrate no focal stenosis. The vertebral and basilar arteries appear unremarkable. BRAIN: No mass effect or midline shift. No abnormal extra-axial fluid collection. The gray-white differentiation appears grossly maintained. 90% or greater subtotal stenosis identified right proximal ICA level carotid bifurcation. No hemodynamic stenosis or occlusion identified involving intracranial arterial circulation     Cta Head W Contrast    Result Date: 4/27/2019  EXAMINATION: CTA OF THE NECK; CTA OF THE HEAD WITH CONTRAST 4/27/2019 12:07 am: TECHNIQUE: CTA of the neck was performed with the administration of intravenous contrast. Multiplanar reformatted images are provided for review. MIP images are provided for review. Stenosis of the internal carotid arteries measured using NASCET criteria. Dose modulation, iterative reconstruction, and/or weight based adjustment of the mA/kV was utilized to reduce the radiation dose to as low as reasonably achievable.; CTA of the head/brain was performed with the administration of intravenous contrast. Multiplanar reformatted images are provided for review. MIP images are provided for review. Dose modulation, iterative reconstruction, and/or weight based adjustment of the mA/kV was utilized to reduce the radiation dose to as low as reasonably achievable. 3D reconstructed images were performed on a separate workstation and provided for review. COMPARISON: CT neck 07/08/2008 CT head 04/26/2019 HISTORY: ORDERING SYSTEM PROVIDED HISTORY: stroke like symptoms, NIH 4 TECHNOLOGIST PROVIDED HISTORY: Ordering Physician Provided Reason for Exam: stroke-like symptoms Acuity: Acute Type of Exam: Unknown FINDINGS: CTA NECK: AORTIC ARCH/ARCH VESSELS: There is a normal branch pattern of the aortic arch.  No significant circulation     Mri Brain W Wo Contrast    Result Date: 4/27/2019  EXAMINATION: MRI OF THE BRAIN WITHOUT AND WITH CONTRAST  4/27/2019 11:14 pm TECHNIQUE: Multiplanar multisequence MRI of the head/brain was performed without and with the administration of intravenous contrast. COMPARISON: None. HISTORY: ORDERING SYSTEM PROVIDED HISTORY: post TPA, stroke symptoms w 90% L ICA stenosis. FINDINGS: Mild-to-moderate motion degradation challenge evaluation. INTRACRANIAL STRUCTURES/VENTRICLES:  No evidence of restricted diffusion to suggest acute territory infarction. Moderate generalized involutional change brain parenchyma identified with prominence of ventricles and sulci. Moderate periventricular and subcortical T2 prolongation identified suggestive chronic small vessel ischemic disease. Minimal gliosis identified along the right hand knob along the right precentral gyrus. A few scattered chronic lacune infarcts identified involving both frontal and parietal centrum semiovale. Major intracranial vascular flow voids are maintained. Subtle region grossly unremarkable. No hemorrhagic transformation status post tPA. ORBITS: The visualized portion of the orbits demonstrate no acute abnormality. SINUSES: Mild-to-moderate ethmoid sinus and maxillary sinus mucosal thickening. BONES/SOFT TISSUES: The bone marrow signal intensity appears normal. The soft tissues demonstrate no acute abnormality. Moderate chronic small ischemic disease and age related involutional change without acute stroke, midline shift or mass effect. No hemorrhagic transformation status post tPA.          Consultations:    Consults:     Final Specialist Recommendations/Findings:   IP CONSULT TO INTERNAL MEDICINE  IP CONSULT TO PHYSICAL MEDICINE REHAB  IP CONSULT TO NEUROLOGY  IP CONSULT TO UROLOGY      The patient was seen and examined on day of discharge and this discharge summary is in conjunction with any daily progress note from day of discharge. Discharge plan:       Disposition: Skilled Facility    Physician Follow Up:     Neetu Burch [address] [facility]  [address] [facility]  [phone]    Schedule an appointment as soon as possible for a visit  re-assess lantus dose    Hodges Mcburney, MD  [address] [facility], [address]  [address] [place] [address]  [phone]    Schedule an appointment as soon as possible for a visit in 6 weeks  For Rehabilitation follow up, Please schedule at [facility] office    Wilberto Salgado MD  [address]  [address] [phone]      1-2 weeks for void trial and catheter removal     Doris Garcia MD  [address] [place] [address]  [phone]    In 2 weeks      Mine Carballo MD  [address] [facility], [address]  MOB # Jordon [address] [place] [address]  [phone]    In 3 months         Requiring Further Evaluation/Follow Up POST HOSPITALIZATION/Incidental Findings: R ICA stenosis, f/u EEG results     Diet: diabetic diet    Activity: As tolerated    Instructions to Patient: please schedule f/u appointments as noted above. Discharge Medications:      Medication List      START taking these medications    aspirin 81 MG EC tablet  Take 1 tablet by mouth daily  Start taking on:  5/2/2019        CHANGE how you take these medications    clopidogrel 75 MG tablet  Commonly known as:  PLAVIX  Take 1 tablet by mouth daily  Start taking on:  5/2/2019  What changed:    · how much to take  · how to take this  · when to take this     LANTUS SOLOSTAR 100 UNIT/ML injection pen  Generic drug:  insulin glargine  Inject 60 Units into the skin daily  What changed:  how much to take        CONTINUE taking these medications    BD ULTRA-FINE PEN NEEDLES 29G X 12.7MM Misc  Generic drug:  Insulin Pen Needle     fluticasone 50 MCG/ACT nasal spray  Commonly known as:  FLONASE  2 sprays by Nasal route daily 2 SPRAYS IN EACH NOSTRIL.      loperamide 2 MG capsule  Commonly known as:  IMODIUM     metoprolol tartrate 50 MG tablet  Commonly known as:  LOPRESSOR     montelukast 10 MG tablet  Commonly known as:  SINGULAIR     omeprazole 20 MG delayed release capsule  Commonly known as:  PRILOSEC     SYMBICORT 160-4.5 MCG/ACT Aero  Generic drug:  budesonide-formoterol           Where to Get Your Medications      These medications were sent to [id number] [address]  [place] [address]    Phone:  [phone]   · aspirin 81 MG EC tablet  · clopidogrel 75 MG tablet         Time Spent on discharge is  35 mins in patient examination, evaluation, counseling as well as medication reconciliation, prescriptions for required medications, discharge plan and follow up. Electronically signed by   Yunier Ochoa  5/1/2019  5:15 PM      Thank you Dr. Darinel Reed DO for the opportunity to be involved in this patient's care.

## 2019-05-01 NOTE — PROGRESS NOTES
Speech Language Pathology  Speech Language Pathology  [facility]    Cognitive Treatment Note    Date: 2019  Patients Name: Ying Jacobs  MRN: [id number]  Diagnosis:   Patient Active Problem List   Diagnosis Code    Lumbosacral spondylosis without myelopathy M47.817    Degeneration of lumbar or lumbosacral intervertebral disc M51.37    Spinal stenosis, lumbar region, with neurogenic claudication M48.062    Lumbar spondylolysis M43.06    Postural kyphosis of lumbar region M40.05    Arthritis of right hip M16.11    Right hip pain M25.551    Weight loss R63.4    Right lower quadrant abdominal pain R10.31    Anemia D64.9    Dizziness R42    Internal carotid artery stenosis, right I65.21    Received intravenous tissue plasminogen activator (t-PA) in emergency department Z92.82    Ischemic stroke ([facility] [address] I63.9    Tissue plasminogen activator (t-PA) administered at other facility within 24 hours prior to current admission Z92.82    Hypoglycemia E16.2    History of diabetes mellitus Z86.39    Carotid stenosis, symptomatic w/o infarct, right I65.21       Pain: 0/10    Cognitive Treatment    Treatment time: 10:00-10:30      Subjective: [x] Alert [x] Cooperative     [] Confused     [] Agitated    [] Lethargic      Objective/Assessment:    Recall: Memory/Mental Manipulation (Size - 3-4 words): 6/10 increased to 10/10 with min-mod verbal cues and repetitions. Delayed (3 units): 2/3 increased to 3/3 with mod verbal cues. Organization: Sequencin/5 increased to 5/5 with mod verbal cues. Problem Solving/Reasoning: Multiple Uses for Objects: 5/10 increased to 10/10 with mod-max verbal cues. Stating Situational Problems: 6/10 increased to 10/10 with mod verbal cues. Speech: Pt completed O/M exercises for dysarthria x 5 x 1 set with min-mod verbal/visual cues. Pt provided with exercises and list of compensatory strategies for dysarthria at bedside.      Further therapy recommended at discharge. The patient should be able to tolerate at least three hours of therapy per day over 5 days or 15 hours over 7 days.        Plan:  [x] Continue ST services    [] Discharge from ST:      Discharge recommendations: [] Inpatient Rehab   [] [place]   [] Outpatient Therapy  [] Follow up at trauma clinic   [x] Other: therapy      Completed by Darby Ott,  Clinician  Moy Mcgill M.S., CCC/SLP

## 2019-05-01 NOTE — DISCHARGE INSTR - COC
Assessment Clean;Dry; Intact 5/1/2019  4:00 AM   Drainage Amount None 5/1/2019  4:00 AM   Odor None 5/1/2019  4:00 AM   Number of days: 1        Elimination:  Continence:   · Bowel: Yes  · Bladder: No  Urinary Catheter: Insertion Date: 05/01/2019 and Indication for Use of Catheter: Urology/Urologist seeing this patient or inserted indwelling catheter and Acute urinary retention/obstruction   Colostomy/Ileostomy/Ileal Conduit: No       Date of Last BM: 04/30/2019    Intake/Output Summary (Last 24 hours) at 5/1/2019 0829  Last data filed at 5/1/2019 0500  Gross per 24 hour   Intake --   Output 2150 ml   Net -2150 ml     I/O last 3 completed shifts:  In: -   Out: 2150 [Urine:2150]    Safety Concerns: At Risk for Falls    Impairments/Disabilities:      Vision and Hearing    Nutrition Therapy:  Current Nutrition Therapy:   - Oral Diet:  General    Routes of Feeding: Oral  Liquids: Thin Liquids  Daily Fluid Restriction: no  Last Modified Barium Swallow with Video (Video Swallowing Test): not done    Treatments at the Time of Hospital Discharge:   Respiratory Treatments: None  Oxygen Therapy:  is not on home oxygen therapy.   Ventilator:    - No ventilator support    Rehab Therapies: Physical Therapy and Occupational Therapy  Weight Bearing Status/Restrictions: No weight bearing restirctions  Other Medical Equipment (for information only, NOT a DME order):  wheelchair and walker  Other Treatments: None    Patient's personal belongings (please select all that are sent with patient):  Saul    RN SIGNATURE:  Electronically signed by Nessa Springer RN on 5/1/19 at 5:26 PM    CASE MANAGEMENT/SOCIAL WORK SECTION    Inpatient Status Date: ***    Readmission Risk Assessment Score:  Readmission Risk              Risk of Unplanned Readmission:        27           Discharging to Facility/ Agency   · [facility]: [facility]  Address[facility]  [address] [place] [address] [facility] [address] [facility] [address]        Phone: [phone]       Fax: [phone] Dialysis Facility (if applicable)   · Name:  · Address:  · Dialysis Schedule:  · Phone:  · Fax:    / signature: Electronically signed by Luz Elena Hopson RN on 5/1/19 at 4:55 PM    PHYSICIAN SECTION    Prognosis: {Prognosis:6554778863}    Condition at Discharge: 50Rody Waggoner Patient Condition:725389071}    Rehab Potential (if transferring to Rehab): {Prognosis:7837633995}    Recommended Labs or Other Treatments After Discharge: ***    Physician Certification: I certify the above information and transfer of Mikie Fisher  is necessary for the continuing treatment of the diagnosis listed and that he requires {Admit to Appropriate Level of Care:88830} for {GREATER/LESS:565397107} 30 days.      Update Admission H&P: {CHP DME Changes in CRQFU:909550595}    PHYSICIAN SIGNATURE:  Electronically signed by Consuelo Das MD on 5/1/19 at 11:23 AM

## 2019-05-01 NOTE — PROGRESS NOTES
Physical Therapy  DATE: 2019    NAME: Ailin Hester  MRN: [id number]   : 3/12/1931    Patient not seen this date for Physical Therapy due to:  [] Blood transfusion in progress  [] Hemodialysis  []  Patient Declined  [] Spine Precautions   [] Strict Bedrest  [] Surgery/ Procedure  [x] Testing: Pt off floor for EEG      [] Other        [] PT being discontinued at this time. Patient independent. No further needs. [] PT being discontinued at this time as the patient has been transferred to palliative care. No further needs. [facility] Treatment performed by Student PTA under the supervision of co-signing PTA who agrees with all treatment and documentation.    Dallas Javed PTA

## 2019-05-01 NOTE — PROGRESS NOTES
ENDOVASCULAR NEUROSURGERY PROGRESS NOTE  5/1/2019 10:07 AM  Subjective:   Admit Date: 4/27/2019  PCP: Jaun Rodgers DO    Occasional confusion. Difficulty urination. Non focal neuro exam.     Objective:   Vitals: BP (!) 183/71   Pulse 69   Temp 97.2 °F (36.2 °C)   Resp 20   Ht 6' 3\" (1.905 m)   Wt 217 lb (98.4 kg) Comment: from floor  SpO2 99%   BMI 27.12 kg/m²     General appearance: Lying in bed, NAD,  Lungs: CTAB  Heart: RRR  Abdomen: soft, NTND, bowel sounds normal    Neuro exam: Follows simple commands, disoriented. CN II-XII: no gross facial asymmetry, dysarthria, pupils 2 mm reactive to light bilaterally, EOMI, VFF. Juliocesar spontaneously against gravity.  Tremors b/l        Medications and labs:   Scheduled Meds:   insulin lispro  3 Units Subcutaneous TID WC    metoprolol tartrate  25 mg Oral BID    enoxaparin  40 mg Subcutaneous Daily    miconazole   Topical BID    carbidopa-levodopa  1 tablet Oral TID    insulin glargine  10 Units Subcutaneous Nightly    insulin lispro  0-12 Units Subcutaneous TID WC    insulin lispro  0-6 Units Subcutaneous Nightly    mupirocin   Topical Daily    senna  2 tablet Oral Nightly    QUEtiapine  25 mg Oral Nightly    sodium chloride flush  10 mL Intravenous 2 times per day    atorvastatin  40 mg Oral Nightly    aspirin  325 mg Oral Once    clopidogrel  300 mg Oral Once    aspirin  81 mg Oral Daily    clopidogrel  75 mg Oral Daily     Continuous Infusions:   sodium chloride 75 mL/hr at 05/01/19 0952    niCARdipine in sodium chloride Stopped (04/30/19 0118)    dextrose Stopped (04/27/19 1510)     CBC:   Recent Labs     04/29/19  0938 04/30/19  0502 05/01/19  0605   WBC 9.6 10.9 10.1   HGB 12.9* 11.9* 11.2*    222 226     BMP:    Recent Labs     04/29/19  0938 04/30/19  0502 05/01/19  0605    140 138   K 4.8 4.3 4.1    108* 106   CO2 21 20 21   BUN 15 15 19   CREATININE 1.13 1.16 1.24*   GLUCOSE 187* 143* 149*       Assessment and Recommendations:   80years old male with left sided weakness and dysarthria s/p tPA 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 MRI is neg for acute stroke. He is s/p GEM stent with angioplasty 4/29. No complication    1. Continue ASA 81 mg and Plavix 75 mg daily   2. Continue lipitor 40 mg daily  4. Keep BP  SBP  5. Occasional confusion unlikely to be a vascular event. Management per primary team  6.  Follow up with Dr. Sunday Brown 2 weeks and Dr. Angie Zambrano in 3 months with CTA neck    Chester Alvarez MD  Stroke, [facility]  [address] [address] [place]  Electronically signed 5/1/2019 at 10:07 AM

## 2019-05-01 NOTE — PROGRESS NOTES
Cystocopy; Colonoscopy; Total knee arthroplasty (Left); Inguinal hernia repair (Bilateral); Cataract removal with implant (Bilateral); Lumbar spine surgery; Revision total knee arthroplasty (Left); and Total hip arthroplasty (Right, 07/13/2016). HOME MEDICATIONS        Prior to Admission medications    Medication Sig Start Date End Date Taking? Authorizing Provider   clopidogrel (PLAVIX) 75 MG tablet  4/21/19   Historical Provider, MD   fluticasone (FLONASE) 50 MCG/ACT nasal spray 2 sprays by Nasal route daily 2 SPRAYS IN EACH NOSTRIL. 8/7/18 9/6/18  Adrien Walker DO   montelukast (SINGULAIR) 10 MG tablet  8/15/17   Historical Provider, MD   metoprolol tartrate (LOPRESSOR) 50 MG tablet Take 50 mg by mouth daily  5/6/17   Historical Provider, MD   omeprazole (PRILOSEC) 20 MG delayed release capsule Take 20 mg by mouth Daily Indications: ([facility] Mail order)     Historical Provider, MD   budesonide-formoterol (SYMBICORT) 160-4.5 MCG/ACT AERO Inhale 1 puff into the lungs 2 times daily Indications: ([facility] Mail order)     Historical Provider, MD   LANTUS SOLOSTAR 100 UNIT/ML injection pen Inject 60 Units into the skin daily  Patient taking differently: Inject 55 Units into the skin daily  7/16/16   Guru Tavarez MD   BD ULTRA-FINE PEN NEEDLES 29G X 12.7MM MISC Inject 1 each into the skin daily  4/7/16   Historical Provider, MD   loperamide (IMODIUM) 2 MG capsule Take 2 mg by mouth 4 times daily as needed. Historical Provider, MD       ALLERGIES      Bactrim [sulfamethoxazole-trimethoprim]; Other; Sulfa antibiotics; Sulfur; Versed [midazolam]; and Pcn [penicillins]    SOCIAL HISTORY       reports that he has quit smoking. His smoking use included cigarettes. He quit smokeless tobacco use about 32 years ago. He reports that he does not drink alcohol or use drugs.      FAMILY HISTORY      family history includes Coronary Art Dis in his father; Emphysema in his mother; Heart Attack in his father; High Blood Pressure in his father. REVIEW OF SYSTEMS      Unable to be obtained because of patient's mentation. PHYSICAL EXAM      BP (!) 183/71   Pulse 75   Temp 97.2 °F (36.2 °C)   Resp 20   Ht 6' 3\" (1.905 m)   Wt 217 lb (98.4 kg) Comment: from floor  SpO2 99%   BMI 27.12 kg/m²      · General appearance: well nourished  · HEENT: Head: Normocephalic, no lesions, without obvious abnormality. · Lungs: clear to auscultation bilaterally  · Heart: regular rate and rhythm, S1, S2 normal, no murmur, click, rub or gallop  · Abdomen: soft, non-tender; bowel sounds normal; no masses,  no organomegaly  · Extremities: extremities normal, atraumatic, no cyanosis or edema  · Neurological: Gait normal. Reflexes normal and symmetric. Sensation grossly normal  · Skin - no rash, no lump   · Eye no icterus no redness  · Psych-normal affect   · NEURO-no limb weakness  No facial droop  · Lymphatic system-no lymphadenopathy no splenomegaly     DIAGNOSTICS      Laboratory Testing:  CBC:   Recent Labs     05/01/19  0605   WBC 10.1   HGB 11.2*        BMP:    Recent Labs     04/29/19  0938 04/30/19  0502 05/01/19  0605    140 138   K 4.8 4.3 4.1    108* 106   CO2 21 20 21   BUN 15 15 19   CREATININE 1.13 1.16 1.24*   GLUCOSE 187* 143* 149*     S. Calcium:  Recent Labs     05/01/19  0605   CALCIUM 8.5*     S. Ionized Calcium:No results for input(s): IONCA in the last 72 hours. S. Magnesium:No results for input(s): MG in the last 72 hours. S. Phosphorus:No results for input(s): PHOS in the last 72 hours. S. Glucose:  Recent Labs     04/30/19  1201 04/30/19  1821 04/30/19  2016   POCGLU 254* 82 216*     Glycosylated hemoglobin A1C:   No results for input(s): LABA1C in the last 72 hours. INR:   No results for input(s): INR in the last 72 hours. Hepatic functions: No results for input(s): ALKPHOS, ALT, AST, PROT, BILITOT, BILIDIR, LABALBU in the last 72 hours.   Pancreatic functions:  No results for input(s): LACTA, AMYLASE in the last 72 hours. S. Lactic Acid:   No results for input(s): LACTA in the last 72 hours. Cardiac enzymes:  No results for input(s): CKTOTAL, CKMB, CKMBINDEX, TROPONINI in the last 72 hours. BNP:No results for input(s): BNP in the last 72 hours. Lipid profile: No results for input(s): CHOL, TRIG, HDL, LDLCALC in the last 72 hours. Invalid input(s): LDL  Blood Gases: No results found for: PH, PCO2, PO2, HCO3, O2SAT  Thyroid functions:   Lab Results   Component Value Date    TSH 5.92 04/18/2019        Imaging/Diagonstics:      CXR: No acute cardiopulmonary findings. ASSESSMENT     Active Problems:    Ischemic stroke (HCC)    Tissue plasminogen activator (t-PA) administered at other facility within 24 hours prior to current admission    Hypoglycemia    History of diabetes mellitus    Carotid stenosis, symptomatic w/o infarct, right  Resolved Problems:    * No resolved hospital problems. *    PLAN      1. Hypoglycemia: resolved  2. Hyperglycemia - continue lantus 10 u, decrease humalog to 3 u  With meals. 3. Ischemic stroke: Status post tPA at outlying facility, CTA showing more than 90 person ICA stenosis: s/p stenting. Management per primary. 4. Continue ASA, Plavix and statin     Thank you for the consult. We will sign off at present. Please call back if any questions. Electronically signed by Jean Claude Carter MD on 5/1/2019 at 8:48 AM        IM Attending    Pt seen and examined today   I have discussed the care of pt , including pertinent history and exam findings,  with the resident. I have reviewed the key elements of all parts of the encounter with the resident. I agree with the assessment, plan and orders as documented by the resident.     Electronically signed by James Solorio MD on 5/1/2019 at 11:58 PM

## 2019-05-01 NOTE — CONSULTS
Felton Jacob, Bambi Hoff, [address] [facility], [address], [address]  Urology Consultation      Patient:  Rasta Snow  MRN: [id number]  YOB: 1931    CHIEF COMPLAINT:  Urinary Retention    HISTORY OF PRESENT ILLNESS:   The patient is a 80 y.o. male who presented originally with left sided weakness and dysarthria and is s/p tPA with a severe Right ICA stenosis and underwent angioplasty and stent on 4/29/2019. He had his Honeycutt catheter removed yesterday and has been unable to void therefore urology was consulted. He was straight cath'd x 2 with volumes upwards of 800 mL. Upon repeat attempt to cath nurse felt resistance and was not successful. At bedside I did insert 18 Fr coudet catheter using urojet and had some resistance but was able to place catheter with no trauma for output of 1 liter. Urine yellow and clear. Patient did feel he needed to void but was unable, actually did have some assumed overflow incontinence/voiding while examining patient before catheter was able to be placed. Patient denies urologic history of stones, infections or cancers. Does state for past 2-3 years he sees blood in his urine intermittently but has not mentioned it to any health care provider. Denies any problems with his stream. Per nurse, from wife who wasn't present, patient had \"prostate scraped\" a couple years ago and was told he has IC, though patient does not remember details of this. He did have a BM yesterday, at home usually every 3-4 days. History of back problems but no surgery. Is diabetic but denies any diabetic neuropathy. Patient uses walker at home, falls frequently, and is working with PT but not very ambulatory at this point. Patient's old records, notes and chart reviewed and summarized above.     Past Medical History:    Past Medical History:   Diagnosis Date    Arthritis     Asthma     Cystitis     Diabetic neuropathy (HCC)     Diarrhea     Dizziness     GERD (gastroesophageal reflux disease)     H/O acute bronchitis     [place] (hard of hearing)     Hx-TIA (transient ischemic attack)     h/o 3,  2009, 2012, 2014, 2015    Hypertension     Kyphosis of thoracolumbar region     Neurogenic claudication     Numbness and tingling     feet    Onychomycosis     Prolonged emergence from general anesthesia     Spinal stenosis of lumbar region     Spondylolysis of lumbar region     Type II or unspecified type diabetes mellitus without mention of complication, not stated as uncontrolled     Wears glasses        Past Surgical History:    Past Surgical History:   Procedure Laterality Date    CATARACT REMOVAL WITH IMPLANT Bilateral     COLONOSCOPY      CYSTOSCOPY      INGUINAL HERNIA REPAIR Bilateral     LUMBAR SPINE SURGERY      lumbar-6 screws and 2 bars    REVISION TOTAL KNEE ARTHROPLASTY Left     total of 3 d/t infections    TONSILLECTOMY      TOTAL HIP ARTHROPLASTY Right 07/13/2016    TOTAL KNEE ARTHROPLASTY Left     , 4 total surgeries, infection, I&D, spacer placed and removed       Medications:      Current Facility-Administered Medications:     insulin lispro (HUMALOG) injection vial 3 Units, 3 Units, Subcutaneous, TID WC, Fatoumata Aldrich MD    metoprolol tartrate (LOPRESSOR) tablet 25 mg, 25 mg, Oral, BID, Fatoumata Aldrich MD, 25 mg at 05/01/19 0933    lidocaine (XYLOCAINE) 2% uro-jet, , Topical, Once, Francisco Tavera MD    enoxaparin (LOVENOX) injection 40 mg, 40 mg, Subcutaneous, Daily, Chloé Roblero MD, 40 mg at 05/01/19 0935    miconazole (MICOTIN) 2 % powder, , Topical, BID, Chloé Roblero MD    0.9 % sodium chloride infusion, , Intravenous, Continuous, Chloé Roblero MD, Last Rate: 75 mL/hr at 05/01/19 0952    carbidopa-levodopa (SINEMET)  MG per tablet 1 tablet, 1 tablet, Oral, TID, Chloé Roblero MD, 1 tablet at 05/01/19 0933    insulin glargine (LANTUS) injection vial 10 Units, 10 Units, Subcutaneous, Nightly, Fatoumata Aldrich MD, 10 Units at 04/30/19 2102    insulin lispro (HUMALOG) injection vial 0-12 Units, 0-12 Units, Subcutaneous, TID WC, Nati Diaz MD, 2 Units at 05/01/19 0941    insulin lispro (HUMALOG) injection vial 0-6 Units, 0-6 Units, Subcutaneous, Nightly, Nati Diaz MD, 2 Units at 04/30/19 2114    mupirocin (BACTROBAN) 2 % ointment, , Topical, Daily, Jody Yang MD    niCARdipine (CARDENE) 20 mg in 0.9 % sodium chloride 200 mL infusion, 5 mg/hr, Intravenous, Continuous, Jody Yang MD, Stopped at 04/30/19 0118    acetaminophen (TYLENOL) tablet 650 mg, 650 mg, Oral, Q4H PRN, Kathleen Buitrago MD    senna (SENOKOT) tablet 17.2 mg, 2 tablet, Oral, Nightly, Cecilia Childress MD, 17.2 mg at 04/30/19 2102    bisacodyl (DULCOLAX) suppository 10 mg, 10 mg, Rectal, Daily PRN, Cecilia Childress MD, 10 mg at 04/29/19 1854    guaiFENesin tablet 200 mg, 200 mg, Oral, Q6H PRN, Jody Yang MD    fentaNYL (SUBLIMAZE) injection 50 mcg, 50 mcg, Intravenous, Q4H PRN, Jim Frank MD    hydrALAZINE (APRESOLINE) injection 10 mg, 10 mg, Intravenous, Q4H PRN, Jim Frank MD, 10 mg at 04/29/19 1828    ziprasidone (GEODON) injection 5 mg, 5 mg, Intramuscular, Q6H PRN **AND** sterile water injection 1.2 mL, 1.2 mL, Intramuscular, Q6H PRN, Jody Yang MD    QUEtiapine (SEROQUEL) tablet 25 mg, 25 mg, Oral, Nightly, Flaquito Alicea MD, 25 mg at 04/30/19 2102    sodium chloride flush 0.9 % injection 10 mL, 10 mL, Intravenous, 2 times per day, Jody Yang MD, 10 mL at 05/01/19 0935    sodium chloride flush 0.9 % injection 10 mL, 10 mL, Intravenous, PRN, Jody Yang MD    magnesium hydroxide (MILK OF MAGNESIA) 400 MG/5ML suspension 30 mL, 30 mL, Oral, Daily PRN, Jody Yang MD    ondansetron (ZOFRAN) injection 4 mg, 4 mg, Intravenous, Q6H PRN, Jody Yang MD    glucose (GLUTOSE) 40 % oral gel 15 g, 15 g, Oral, PRN, Jody Yang MD    dextrose 50 % solution 12.5 g, 12.5 g, Intravenous, PRN, Jody Yang MD, 12.5 g at 04/27/19 0929    glucagon

## 2019-05-02 ENCOUNTER — TELEPHONE (OUTPATIENT)
Dept: UROLOGY | Age: 84
End: 2019-05-02

## 2019-05-02 NOTE — TELEPHONE ENCOUNTER
ECF called he wanted to sched a void trial for the pt he had no infortmation about the pts catheter , I advised that I would have to speak to DR Shant Mixon on Monday to see what he wanted to do . I asked if he was able to do a void trial at the [facility]. He then instructed me on how to do a void trial ... I advised that was not the way we do one. I advised that we usually have the ECF pull the cath and wait for a few hours and do a pvr.  . I again advised that I would discuss with DR Shant Mixon  On Monday

## 2019-05-07 ENCOUNTER — TELEPHONE (OUTPATIENT)
Dept: UROLOGY | Age: 84
End: 2019-05-07

## 2019-05-13 ENCOUNTER — OFFICE VISIT (OUTPATIENT)
Dept: UROLOGY | Age: 84
End: 2019-05-13
Payer: MEDICARE

## 2019-05-13 VITALS — SYSTOLIC BLOOD PRESSURE: 134 MMHG | HEART RATE: 64 BPM | TEMPERATURE: 98 F | DIASTOLIC BLOOD PRESSURE: 78 MMHG

## 2019-05-13 DIAGNOSIS — R39.12 WEAK URINARY STREAM: ICD-10-CM

## 2019-05-13 DIAGNOSIS — R39.13 INTERMITTENT URINARY STREAM: ICD-10-CM

## 2019-05-13 DIAGNOSIS — R33.9 INCOMPLETE BLADDER EMPTYING: Primary | ICD-10-CM

## 2019-05-13 PROCEDURE — 4040F PNEUMOC VAC/ADMIN/RCVD: CPT | Performed by: UROLOGY

## 2019-05-13 PROCEDURE — G8419 CALC BMI OUT NRM PARAM NOF/U: HCPCS | Performed by: UROLOGY

## 2019-05-13 PROCEDURE — G8598 ASA/ANTIPLAT THER USED: HCPCS | Performed by: UROLOGY

## 2019-05-13 PROCEDURE — 1036F TOBACCO NON-USER: CPT | Performed by: UROLOGY

## 2019-05-13 PROCEDURE — 1123F ACP DISCUSS/DSCN MKR DOCD: CPT | Performed by: UROLOGY

## 2019-05-13 PROCEDURE — 51798 US URINE CAPACITY MEASURE: CPT | Performed by: UROLOGY

## 2019-05-13 PROCEDURE — 99214 OFFICE O/P EST MOD 30 MIN: CPT | Performed by: UROLOGY

## 2019-05-13 PROCEDURE — G8427 DOCREV CUR MEDS BY ELIG CLIN: HCPCS | Performed by: UROLOGY

## 2019-05-13 PROCEDURE — 1111F DSCHRG MED/CURRENT MED MERGE: CPT | Performed by: UROLOGY

## 2019-05-13 RX ORDER — POLYETHYLENE GLYCOL 3350 17 G/17G
17 POWDER, FOR SOLUTION ORAL DAILY
COMMUNITY

## 2019-05-13 RX ORDER — CLOTRIMAZOLE AND BETAMETHASONE DIPROPIONATE 10; .64 MG/G; MG/G
CREAM TOPICAL 2 TIMES DAILY
COMMUNITY

## 2019-05-13 RX ORDER — ACETAMINOPHEN 500 MG
500 TABLET ORAL EVERY 6 HOURS PRN
COMMUNITY

## 2019-05-13 ASSESSMENT — ENCOUNTER SYMPTOMS
ABDOMINAL PAIN: 0
WHEEZING: 0
COUGH: 0
BACK PAIN: 0
COLOR CHANGE: 0
EYE PAIN: 0
VOMITING: 0
NAUSEA: 0
EYE REDNESS: 0
SHORTNESS OF BREATH: 0

## 2019-05-13 NOTE — PROGRESS NOTES
[facility]  [facility] UROLOGY SPECIALISTS - [place]  [address]  [address]  [address] [facility] [address]  Dept: [address] [facility] Urology Office Note - Established    Patient:  Karlee Hand  YOB: 1931  Date: 5/13/2019    The patient is a 80 y.o. male who presents todayfor evaluation of the following problems:   Chief Complaint   Patient presents with    Urinary Retention     no cath in, PVR       HPI  Pt was recently discharge from the hospital with a whitaker for retention. Had pvr 1L. Whitaker was placed. Catheter was removed 3 days ao. Pt feels this his stream is still weak and intermittent. Has some trouble emptying his bladder. Summary of old records: N/A    Additional History: N/A    Procedures Today: N/A    Urinalysis today:  No results found for this visit on 05/13/19.   Last several PSA's:  Lab Results   Component Value Date    PSA 0.04 04/18/2019     Last total testosterone:  No results found for: TESTOSTERONE    AUA Symptom Score (5/13/2019):  INCOMPLETE EMPTYING: How often have you had the sensation of not emptying your bladder?: About Half the time  FREQUENCY: How often do you have to urinate less than every two hours?: Not at all  INTERMITTENCY: How often have you found you stopped and started again several times when you urinated?: About Half the time  URGENCY: How often have you found it difficult to postpone urination?: About Half the time  WEAK STREAM: How often have you had a weak urinary stream?: About Half the time  STRAINING: How often have you had to strain to start  urination?: Not at all  NOCTURIA: How many times did you typically get up at night to uriniate?: 2 Times  TOTAL I-PSS SCORE[de-identified] 14  How would you feel if you were to spend the rest of your life with your urinary condition?: Mixe    Last BUN and creatinine:  Lab Results   Component Value Date    BUN 19 05/01/2019     Lab Results   Component Value Date    CREATININE 1.24 (H) 05/01/2019 Additional Lab/Culture results: none    Imaging Reviewed during this Office Visit: none  (results were independently reviewed by physician and radiology report verified)    PAST MEDICAL, FAMILY AND SOCIAL HISTORY UPDATE:  Past Medical History:   Diagnosis Date    Arthritis     Asthma     Cystitis     Diabetic neuropathy ([address]     Diarrhea     Dizziness     GERD (gastroesophageal reflux disease)     H/O acute bronchitis     [place] (hard of hearing)     Hx-TIA (transient ischemic attack)     h/o 4,  2009, 2012, 2014, 2015    Hypertension     Kyphosis of thoracolumbar region     Neurogenic claudication     Numbness and tingling     feet    Onychomycosis     Prolonged emergence from general anesthesia     Spinal stenosis of lumbar region     Spondylolysis of lumbar region     Type II or unspecified type diabetes mellitus without mention of complication, not stated as uncontrolled     Wears glasses      Past Surgical History:   Procedure Laterality Date    CATARACT REMOVAL WITH IMPLANT Bilateral     COLONOSCOPY      CYSTOSCOPY      INGUINAL HERNIA REPAIR Bilateral     LUMBAR SPINE SURGERY      lumbar-6 screws and 2 bars    REVISION TOTAL KNEE ARTHROPLASTY Left     total of 3 d/t infections    TONSILLECTOMY      TOTAL HIP ARTHROPLASTY Right 07/13/2016    TOTAL KNEE ARTHROPLASTY Left     , 4 total surgeries, infection, I&D, spacer placed and removed     Family History   Problem Relation Age of Onset    Emphysema Mother     Heart Attack Father     Coronary Art Dis Father     High Blood Pressure Father      Outpatient Medications Marked as Taking for the 5/13/19 encounter (Office Visit) with Aman Ansari MD   Medication Sig Dispense Refill    clotrimazole-betamethasone (LOTRISONE) 1-0.05 % cream Apply topically 2 times daily Apply topically 2 times daily.       polyethylene glycol (GLYCOLAX) powder Take 17 g by mouth daily      acetaminophen (TYLENOL) 500 MG tablet Take 500 mg by mouth every 6 hours as needed for Pain      aspirin 81 MG EC tablet Take 1 tablet by mouth daily 30 tablet 3    clopidogrel (PLAVIX) 75 MG tablet Take 1 tablet by mouth daily 30 tablet 3    fluticasone (FLONASE) 50 MCG/ACT nasal spray 2 sprays by Nasal route daily 2 SPRAYS IN EACH NOSTRIL. 1 Bottle 5    montelukast (SINGULAIR) 10 MG tablet       metoprolol tartrate (LOPRESSOR) 50 MG tablet Take 50 mg by mouth daily       omeprazole (PRILOSEC) 20 MG delayed release capsule Take 20 mg by mouth Daily Indications: ([facility] Mail order)       budesonide-formoterol (SYMBICORT) 160-4.5 MCG/ACT AERO Inhale 1 puff into the lungs 2 times daily Indications: ([facility] Mail order)       LANTUS SOLOSTAR 100 UNIT/ML injection pen Inject 60 Units into the skin daily (Patient taking differently: Inject 55 Units into the skin daily ) 5 Pen 3    BD ULTRA-FINE PEN NEEDLES 29G X 12.7MM MISC Inject 1 each into the skin daily       loperamide (IMODIUM) 2 MG capsule Take 2 mg by mouth 4 times daily as needed. Bactrim [sulfamethoxazole-trimethoprim]; Other; Sulfa antibiotics; Sulfur; Versed [midazolam]; and Pcn [penicillins]  Social History     Tobacco Use   Smoking Status Former Smoker    Types: Cigarettes   Smokeless Tobacco Former User    Quit date: 7/6/1986   Tobacco Comment    stopped 30 years ago      (Ifpatient a smoker, smoking cessation counseling offered)    Social History     Substance and Sexual Activity   Alcohol Use No       REVIEW OF SYSTEMS:  Review of Systems    Physical Exam:      Vitals:    05/13/19 1052   BP: 134/78   Pulse: 64   Temp: 98 °F (36.7 °C)     There is no height or weight on file to calculate BMI. Patient is a 80 y.o. male in no acute distress and alert and oriented to person, place and time. Physical Exam  Constitutional: Patient in no acute distress. Neuro: Alert and oriented to person, place and time.   Psych: Mood normal, affect normal  Skin: No rash noted  HEENT: Head: Normocephalic andatraumatic  Conjunctivae and EOM are normal. Pupils are equal, round  Nose:Normal  Right External Ear: Normal; Left External Ear: Normal  Mouth: Mucosa Moist  Neck: Supple  Lungs: Respiratory effort is normal  Cardiovascular: Warm & Pink  Abdomen: Soft, non-tender, non-distended with no CVA,  No flank tenderness,  Or hepatosplenomegaly   Lymphatics: No palpablelymphadenopathy. Bladder non-tender and not distended. Assessment and Plan      1. Incomplete bladder emptying    2. Weak urinary stream    3. Intermittent urinary stream           Plan:   Cont physical therapy and hopefully with increased ambulation his voiding will improve. F/u 3 mo to reassess LUTS and bladder scan      Return in about 3 months (around 8/13/2019). Prescriptions Ordered:  No orders of the defined types were placed in this encounter. Orders Placed:  Orders Placed This Encounter   Procedures   Jamilah Mckeon MD    Agree with the ROS entered by the MA.

## 2019-05-13 NOTE — PROGRESS NOTES
Review of Systems   Constitutional: Negative for activity change, chills and fever. Eyes: Negative for pain, redness and visual disturbance. Respiratory: Negative for cough, shortness of breath and wheezing. Cardiovascular: Negative for chest pain and leg swelling. Gastrointestinal: Negative for abdominal pain, nausea and vomiting. Genitourinary: Positive for frequency and urgency. Negative for difficulty urinating, discharge, dysuria, flank pain, hematuria, scrotal swelling and testicular pain. Musculoskeletal: Positive for gait problem. Negative for back pain, joint swelling and myalgias. Skin: Negative for color change and rash. Neurological: Negative for dizziness, tremors and numbness. Hematological: Negative for adenopathy. Bruises/bleeds easily.

## 2019-05-15 LAB
EKG ATRIAL RATE: 57 BPM
EKG P AXIS: 19 DEGREES
EKG P-R INTERVAL: 210 MS
EKG Q-T INTERVAL: 434 MS
EKG QRS DURATION: 88 MS
EKG QTC CALCULATION (BAZETT): 422 MS
EKG R AXIS: -12 DEGREES
EKG T AXIS: 15 DEGREES
EKG VENTRICULAR RATE: 57 BPM

## 2019-05-24 ENCOUNTER — OFFICE VISIT (OUTPATIENT)
Dept: NEUROLOGY | Age: 84
End: 2019-05-24
Payer: MEDICARE

## 2019-05-24 VITALS
HEART RATE: 66 BPM | SYSTOLIC BLOOD PRESSURE: 133 MMHG | BODY MASS INDEX: 24.75 KG/M2 | WEIGHT: 198 LBS | DIASTOLIC BLOOD PRESSURE: 67 MMHG

## 2019-05-24 DIAGNOSIS — Z98.890 POST-OPERATIVE STATE: ICD-10-CM

## 2019-05-24 DIAGNOSIS — Z09 HOSPITAL DISCHARGE FOLLOW-UP: ICD-10-CM

## 2019-05-24 DIAGNOSIS — I65.21 CAROTID STENOSIS, SYMPTOMATIC W/O INFARCT, RIGHT: Primary | ICD-10-CM

## 2019-05-24 PROCEDURE — G8427 DOCREV CUR MEDS BY ELIG CLIN: HCPCS | Performed by: NEUROLOGICAL SURGERY

## 2019-05-24 PROCEDURE — 1036F TOBACCO NON-USER: CPT | Performed by: NEUROLOGICAL SURGERY

## 2019-05-24 PROCEDURE — 99213 OFFICE O/P EST LOW 20 MIN: CPT | Performed by: NEUROLOGICAL SURGERY

## 2019-05-24 PROCEDURE — 1123F ACP DISCUSS/DSCN MKR DOCD: CPT | Performed by: NEUROLOGICAL SURGERY

## 2019-05-24 PROCEDURE — G8420 CALC BMI NORM PARAMETERS: HCPCS | Performed by: NEUROLOGICAL SURGERY

## 2019-05-24 PROCEDURE — 4040F PNEUMOC VAC/ADMIN/RCVD: CPT | Performed by: NEUROLOGICAL SURGERY

## 2019-05-24 PROCEDURE — G8598 ASA/ANTIPLAT THER USED: HCPCS | Performed by: NEUROLOGICAL SURGERY

## 2019-05-24 PROCEDURE — 1111F DSCHRG MED/CURRENT MED MERGE: CPT | Performed by: NEUROLOGICAL SURGERY

## 2019-05-25 NOTE — PROGRESS NOTES
Endovascular Neurosurgery/stroke clinic note    Pt Name: Aman Andrade  MRN: [id number]  YOB: 1931  Date of evaluation: 5/25/2019  Primary Care Physician: Verneita Schwab, DO      SUBJECTIVE:  History of Chief Complaint:    80years old male who presented with left sided weakness and dysarthria s/p tPA 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 MRI is neg for acute stroke. He is s/p GEM stent with angioplasty 4/29/19. He currently lives in a nursing home. Uses a wheel chair. Unable to use the walker yet.      Allergies  is allergic to bactrim [sulfamethoxazole-trimethoprim]; other; sulfa antibiotics; sulfur; versed [midazolam]; and pcn [penicillins]. Medications  Prior to Admission medications    Medication Sig Start Date End Date Taking? Authorizing Provider   clotrimazole-betamethasone (LOTRISONE) 1-0.05 % cream Apply topically 2 times daily Apply topically 2 times daily.    Yes Historical Provider, MD   polyethylene glycol (GLYCOLAX) powder Take 17 g by mouth daily   Yes Historical Provider, MD   acetaminophen (TYLENOL) 500 MG tablet Take 500 mg by mouth every 6 hours as needed for Pain   Yes Historical Provider, MD   aspirin 81 MG EC tablet Take 1 tablet by mouth daily 5/2/19  Yes Agustina Benedict   clopidogrel (PLAVIX) 75 MG tablet Take 1 tablet by mouth daily 5/2/19  Yes Lea Giullory   montelukast (SINGULAIR) 10 MG tablet  8/15/17  Yes Historical Provider, MD   metoprolol tartrate (LOPRESSOR) 50 MG tablet Take 50 mg by mouth daily  5/6/17  Yes Historical Provider, MD   omeprazole (PRILOSEC) 20 MG delayed release capsule Take 20 mg by mouth Daily Indications: ([facility] Mail order)    Yes Historical Provider, MD   budesonide-formoterol (SYMBICORT) 160-4.5 MCG/ACT AERO Inhale 1 puff into the lungs 2 times daily Indications: ([facility] Mail order)    Yes Historical Provider, MD   LANTUS SOLOSTAR 100 UNIT/ML injection pen Inject 60 Units into the skin daily  Patient taking differently: Inject 55 Units into the skin daily  7/16/16  Yes Jolanda Denver, MD   BD ULTRA-FINE PEN NEEDLES 29G X 12.7MM MISC Inject 1 each into the skin daily  4/7/16  Yes Historical Provider, MD   loperamide (IMODIUM) 2 MG capsule Take 2 mg by mouth 4 times daily as needed. Yes Historical Provider, MD   fluticasone (FLONASE) 50 MCG/ACT nasal spray 2 sprays by Nasal route daily 2 SPRAYS IN EACH NOSTRIL. 8/7/18 5/13/19  Adrien Walker,     Scheduled Meds:  Continuous Infusions:  PRN Meds:.  Past Medical History   has a past medical history of Arthritis, Asthma, Cystitis, Diabetic neuropathy ([address] Diarrhea, Dizziness, GERD (gastroesophageal reflux disease), H/O acute bronchitis, [place] (hard of hearing), Hx-TIA (transient ischemic attack), Hypertension, Kyphosis of thoracolumbar region, Neurogenic claudication, Numbness and tingling, Onychomycosis, Prolonged emergence from general anesthesia, Spinal stenosis of lumbar region, Spondylolysis of lumbar region, Type II or unspecified type diabetes mellitus without mention of complication, not stated as uncontrolled, and Wears glasses. Past Surgical History   has a past surgical history that includes Tonsillectomy; Cystocopy; Colonoscopy; Total knee arthroplasty (Left); Inguinal hernia repair (Bilateral); Cataract removal with implant (Bilateral); Lumbar spine surgery; Revision total knee arthroplasty (Left); and Total hip arthroplasty (Right, 07/13/2016). Social History   reports that he has quit smoking. His smoking use included cigarettes. He quit smokeless tobacco use about 32 years ago. reports that he does not drink alcohol. reports that he does not use drugs. Family History  family history includes Coronary Art Dis in his father; Emphysema in his mother; Heart Attack in his father; High Blood Pressure in his father.     Review of Systems:  CONSTITUTIONAL:  negative for fevers    EYES:  negative for double vision     HEENT:  negative for tinnitus RESPIRATORY:  negative for cough, shortness of breath    CARDIOVASCULAR:  negative for chest pain, palpitations   GASTROINTESTINAL:  negative for nausea   GENITOURINARY:  negative for hematuria   MUSCULOSKELETAL:  negative for neck pain    NEUROLOGICAL:  See HPI   PSYCHIATRIC:  negative for anxiety      Review of systems otherwise negative. OBJECTIVE:  Vitals: /67 (Site: Right Upper Arm, Position: Sitting, Cuff Size: Medium Adult)   Pulse 66   Wt 198 lb (89.8 kg)   BMI 24.75 kg/m²     On exam today:   Patient is AAO x3, following commands, slow responses. CN II-XII grossly intact, pupils 2 mm reactive to light bilaterally. Normal tone in four extremities. Normal sensory exam to LT. Muscle strength is -5/5 in 4 extremities. DTRs : +1 in bilateral biceps and knees. Cerebellar exam: No nystagmus. Lungs sounds clear to auscultation bilaterally. Heart exam: normal S1,S2 sounds,RRR,no murmers. Abdomen was soft, NT,ND with positive bowel sounds. Normal bilateral radial and pedal pulses. Groin: puncture site looked clean, dry, appropriately tender, no signs of infection or active oozing nor hematoma.    Right leg: warm, normal temperature with normal capillary refill    LABS:  CBC:   Lab Results   Component Value Date    WBC 10.1 05/01/2019    RBC 3.82 05/01/2019    RBC 3.16 04/28/2012    HGB 11.2 05/01/2019    HCT 35.7 05/01/2019    MCV 93.5 05/01/2019    MCH 29.3 05/01/2019    MCHC 31.4 05/01/2019    RDW 14.2 05/01/2019     05/01/2019     04/28/2012    MPV 9.8 05/01/2019     BMP:    Lab Results   Component Value Date     05/01/2019    K 4.1 05/01/2019     05/01/2019    CO2 21 05/01/2019    BUN 19 05/01/2019    LABALBU 4.1 04/18/2019    LABALBU 3.2 04/22/2012    CREATININE 1.24 05/01/2019    CALCIUM 8.5 05/01/2019    GFRAA >60 05/01/2019    LABGLOM 55 05/01/2019    GLUCOSE 149 05/01/2019    GLUCOSE 145 04/22/2012     I  IMAGING:  Images were personally reviewed including:  CT brain without contrast  CTA head and neck  MRI brain    ASSESSMENT AND PLAN  80years old male who presented with left sided weakness and dysarthria s/p tPA 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 MRI is neg for acute stroke. He is s/p GEM stent with angioplasty 4/29/19.     1. Carotid stenosis, symptomatic w/o infarct, right  2. Hospital discharge follow-up  3. Post-operative state    Groin puncture site is intact. Continue aspirin/statin/statin  Stroke work up completed during last hospitalization. Stroke risk factors management per PCP. Education regarding secondary stroke prevention was provided. Patient to follow up with Dr Negin Thomas on 8/6/19 with preclinic CTA neck.     Mayra Shannon MD  Pager [phone]  Stroke, [facility]  [id number]  Electronically signed 5/25/2019 at 6:20 PM

## 2019-06-03 ENCOUNTER — OFFICE VISIT (OUTPATIENT)
Dept: PHYSICAL MEDICINE AND REHAB | Age: 84
End: 2019-06-03
Payer: MEDICARE

## 2019-06-03 VITALS
WEIGHT: 217 LBS | HEIGHT: 75 IN | DIASTOLIC BLOOD PRESSURE: 64 MMHG | TEMPERATURE: 98.2 F | SYSTOLIC BLOOD PRESSURE: 98 MMHG | HEART RATE: 100 BPM | BODY MASS INDEX: 26.98 KG/M2

## 2019-06-03 DIAGNOSIS — R29.898 COGWHEEL RIGIDITY: ICD-10-CM

## 2019-06-03 DIAGNOSIS — I69.391 DYSPHAGIA AS LATE EFFECT OF CEREBROVASCULAR ACCIDENT (CVA): ICD-10-CM

## 2019-06-03 DIAGNOSIS — I63.9 ISCHEMIC STROKE (HCC): Primary | ICD-10-CM

## 2019-06-03 PROCEDURE — G8427 DOCREV CUR MEDS BY ELIG CLIN: HCPCS | Performed by: PHYSICAL MEDICINE & REHABILITATION

## 2019-06-03 PROCEDURE — 99214 OFFICE O/P EST MOD 30 MIN: CPT | Performed by: PHYSICAL MEDICINE & REHABILITATION

## 2019-06-03 PROCEDURE — G8598 ASA/ANTIPLAT THER USED: HCPCS | Performed by: PHYSICAL MEDICINE & REHABILITATION

## 2019-06-03 PROCEDURE — 4040F PNEUMOC VAC/ADMIN/RCVD: CPT | Performed by: PHYSICAL MEDICINE & REHABILITATION

## 2019-06-03 PROCEDURE — 1036F TOBACCO NON-USER: CPT | Performed by: PHYSICAL MEDICINE & REHABILITATION

## 2019-06-03 PROCEDURE — 1123F ACP DISCUSS/DSCN MKR DOCD: CPT | Performed by: PHYSICAL MEDICINE & REHABILITATION

## 2019-06-03 PROCEDURE — G8419 CALC BMI OUT NRM PARAM NOF/U: HCPCS | Performed by: PHYSICAL MEDICINE & REHABILITATION

## 2019-06-03 RX ORDER — PEN NEEDLE, DIABETIC 31 GX5/16"
NEEDLE, DISPOSABLE MISCELLANEOUS
COMMUNITY
Start: 2019-05-28

## 2019-06-03 RX ORDER — LEVOTHYROXINE SODIUM 0.05 MG/1
TABLET ORAL
COMMUNITY
Start: 2019-05-16

## 2019-06-03 RX ORDER — DILTIAZEM HYDROCHLORIDE 60 MG/1
TABLET, FILM COATED ORAL
COMMUNITY
Start: 2019-05-22

## 2019-06-03 NOTE — PROGRESS NOTES
[facility] PHYSICIANS  [facility] PHYSICAL MEDICINE & REHABILITATION  [address] [place] [address]  Dept: [phone]  Dept Fax: [phone]    Outpatient Followup Note    Carol Ann Hardy, 80 y.o., male, presents for follow up c/o of Follow-up (Follow up)  . HPI:     HPI  Patient who was originally seen in PM&R consult at [facility]. Neil's after CVA with L hemiparesis, dysarthria, and dysphagia. He had R ICA stent placed on 4/29/19. He has been treating at [facility] subacute/SNF rehab for 4 weeks. I have no PT/OT/speech notes available for review today to determine his progress. He reports possible discharge home at end of the week. His wife is not present today. SNF records indicate he is receiving all 3 disciplines. He is on mechanical soft diet with nectar thick liquids. Educated him on dysphagia and risk of aspiration pneumonia. On exam he does present with mild resting tremor, cogwheel rigidity and bradykinesia - documented below. Will monitor. If continues may consider movement disorder specialist referral.     Stroke    Affected Side: left non dominant  Handedness: right handed  Dysphagia: Present  Aphasia: None  Sleep:Daytime Fatigue - will monitor how he performs at home. Daytime Focus/Attention:Intact  Bowel:  Incontinent  : Spontaneous  Mood: WNL  Support: spouse / significant other  Spasticity: None  Edema: None  DME: Rolling Walker and Manual Wheelchair and HoverRound at home  Shoulder Pain: None    Past Medical History:   Diagnosis Date    Arthritis     Asthma     Cystitis     Diabetic neuropathy ([facility] [address]     Diarrhea     Dizziness     GERD (gastroesophageal reflux disease)     H/O acute bronchitis     [place] (hard of hearing)     Hx-TIA (transient ischemic attack)     h/o 4,  2009, 2012, 2014, 2015    Hypertension     Kyphosis of thoracolumbar region     Neurogenic claudication     Numbness and tingling     feet    Onychomycosis     Prolonged emergence from general anesthesia     Spinal stenosis of lumbar region     Spondylolysis of lumbar region     Type II or unspecified type diabetes mellitus without mention of complication, not stated as uncontrolled     Wears glasses       Past Surgical History:   Procedure Laterality Date    CATARACT REMOVAL WITH IMPLANT Bilateral     COLONOSCOPY      CYSTOSCOPY      INGUINAL HERNIA REPAIR Bilateral     LUMBAR SPINE SURGERY      lumbar-6 screws and 2 bars    REVISION TOTAL KNEE ARTHROPLASTY Left     total of 3 d/t infections    TONSILLECTOMY      TOTAL HIP ARTHROPLASTY Right 07/13/2016    TOTAL KNEE ARTHROPLASTY Left     , 4 total surgeries, infection, I&D, spacer placed and removed     Family History   Problem Relation Age of Onset    Emphysema Mother     Heart Attack Father     Coronary Art Dis Father     High Blood Pressure Father      Social History     Socioeconomic History    Marital status:      Spouse name: None    Number of children: None    Years of education: None    Highest education level: None   Occupational History    None   Social Needs    Financial resource strain: None    Food insecurity:     Worry: None     Inability: None    Transportation needs:     Medical: None     Non-medical: None   Tobacco Use    Smoking status: Former Smoker     Types: Cigarettes    Smokeless tobacco: Former User     Quit date: 7/6/1986    Tobacco comment: stopped 30 years ago    Substance and Sexual Activity    Alcohol use: No    Drug use: No    Sexual activity: None   Lifestyle    Physical activity:     Days per week: None     Minutes per session: None    Stress: None   Relationships    Social connections:     Talks on phone: None     Gets together: None     Attends [employer] service: None     Active member of club or organization: None     Attends meetings of clubs or organizations: None     Relationship status: None    Intimate partner violence:     Fear of current or ex partner: None Subjective:      Review of Systems  Constitutional: Negative for fever, chills and unexpected weight change. HENT: Negative for trouble swallowing. Respiratory: Negative for cough and shortness of breath. Cardiovascular: Negative for chest pain. Endocrine: Negative for polyuria. Genitourinary: Negative for dysuria, urgency, frequency, incontinence and difficulty urinating. Gastrointestinal: Negative for constipation. Positive for loose stools with \"accidents\". Musculoskeletal: Negative for arthralgias. Neurological: Negative for headaches, numbness, or tingling. Psychiatric: Negative for depressed mood or anxiety. Objective:     Physical Exam  BP 98/64   Pulse 100   Temp 98.2 °F (36.8 °C) (Tympanic)   Ht 6' 3\" (1.905 m)   Wt 217 lb (98.4 kg)   BMI 27.12 kg/m²   Constitutional: He is oriented to person, place,and time. He appears well-developed and well-nourished. HEENT: NCAT, PERRL, EOMI. Mucous membranes pink and moist.  Pulmonary/Chest: Respirations WNL and unlabored. Neurological: He is alert and oriented to person, place, and time. He has normal reflexes. DTRs 2+ and symmetric. CNs WNL. Wearing glasses. Mild resting tremor. Cogwheel rigidity B wrists, worse with distraction maneuvers. Bradykinesia on finger-nose-finger testing, and two types of rapid alternating movement tests. Able to perform heel-to-garcia B. Romberg positive. MSK: Functional ROM all extremities. .  Strength 4+/5 key muscles all extremities. Able to perform sit-to stand transfer without assist. Stands in forward flexed posture. Ambulation not tested as functional status unknown. Skin: Skin is warm dry and intact with good turgor. Psychiatric: He has a normal mood and affect. His behavior is normal. Thought content normal.   Nursing note and vitals reviewed. Imaging: None new    Assessment:       Diagnosis Orders   1. Ischemic stroke ([facility] [address] [facility]     2.  Dysphagia as late effect of cerebrovascular accident (CVA)     3. Cogwheel rigidity          Plan: Will continue to monitor patient's progress and recovery. Asked speech therapy to evaluate him for ANA MARIA FADIA [facility] Protocol while on thickened liquids and to include vital stim in therapy plan if not already performing it. Changed Miralax to prn from daily as patient is c/o loose stools and also has loperamide on his medication list prn. Will consider referral to movement disorder specialist upon reevaluation and discussion with patient/wife at next visit. No orders of the defined types were placed in this encounter. No orders of the defined types were placed in this encounter. Return in about 3 months (around 9/3/2019). Electronically signedby Heidy Chicas MD on 6/3/2019 at 3:59 PM.     Please note that this chart was generated using voice recognition Dragon dictation software. Although everyeffort was made to ensure the accuracy of this automated transcription, some errorsin transcription may have occurred.

## 2019-06-14 ENCOUNTER — APPOINTMENT (OUTPATIENT)
Dept: GENERAL RADIOLOGY | Age: 84
End: 2019-06-14
Payer: MEDICARE

## 2019-06-14 ENCOUNTER — APPOINTMENT (OUTPATIENT)
Dept: NUCLEAR MEDICINE | Age: 84
End: 2019-06-14
Payer: MEDICARE

## 2019-06-14 ENCOUNTER — HOSPITAL ENCOUNTER (EMERGENCY)
Age: 84
Discharge: HOME | End: 2019-06-14
Attending: EMERGENCY MEDICINE
Payer: MEDICARE

## 2019-06-14 VITALS
WEIGHT: 195 LBS | OXYGEN SATURATION: 94 % | BODY MASS INDEX: 24.25 KG/M2 | DIASTOLIC BLOOD PRESSURE: 50 MMHG | HEART RATE: 55 BPM | HEIGHT: 75 IN | SYSTOLIC BLOOD PRESSURE: 133 MMHG | TEMPERATURE: 97.5 F | RESPIRATION RATE: 20 BRPM

## 2019-06-14 DIAGNOSIS — R53.83 FATIGUE, UNSPECIFIED TYPE: Primary | ICD-10-CM

## 2019-06-14 DIAGNOSIS — R09.02 HYPOXIA: ICD-10-CM

## 2019-06-14 LAB
ABSOLUTE EOS #: 0.3 K/UL (ref 0–0.4)
ABSOLUTE IMMATURE GRANULOCYTE: ABNORMAL K/UL (ref 0–0.3)
ABSOLUTE LYMPH #: 1.8 K/UL (ref 1–4.8)
ABSOLUTE MONO #: 0.7 K/UL (ref 0.1–1.3)
ALBUMIN SERPL-MCNC: 3.5 G/DL (ref 3.5–5.2)
ALBUMIN/GLOBULIN RATIO: ABNORMAL (ref 1–2.5)
ALP BLD-CCNC: 87 U/L (ref 40–129)
ALT SERPL-CCNC: 16 U/L (ref 5–41)
ANION GAP SERPL CALCULATED.3IONS-SCNC: 11 MMOL/L (ref 9–17)
AST SERPL-CCNC: 18 U/L
BASOPHILS # BLD: 1 % (ref 0–2)
BASOPHILS ABSOLUTE: 0 K/UL (ref 0–0.2)
BILIRUB SERPL-MCNC: 0.64 MG/DL (ref 0.3–1.2)
BNP INTERPRETATION: NORMAL
BUN BLDV-MCNC: 19 MG/DL (ref 8–23)
BUN/CREAT BLD: ABNORMAL (ref 9–20)
CALCIUM SERPL-MCNC: 8.8 MG/DL (ref 8.6–10.4)
CHLORIDE BLD-SCNC: 100 MMOL/L (ref 98–107)
CO2: 23 MMOL/L (ref 20–31)
CREAT SERPL-MCNC: 1.56 MG/DL (ref 0.7–1.2)
DIFFERENTIAL TYPE: ABNORMAL
EOSINOPHILS RELATIVE PERCENT: 5 % (ref 0–4)
GFR AFRICAN AMERICAN: 51 ML/MIN
GFR NON-AFRICAN AMERICAN: 42 ML/MIN
GFR SERPL CREATININE-BSD FRML MDRD: ABNORMAL ML/MIN/{1.73_M2}
GFR SERPL CREATININE-BSD FRML MDRD: ABNORMAL ML/MIN/{1.73_M2}
GLUCOSE BLD-MCNC: 194 MG/DL (ref 70–99)
GLUCOSE BLD-MCNC: 57 MG/DL (ref 75–110)
GLUCOSE BLD-MCNC: 57 MG/DL (ref 75–110)
HCT VFR BLD CALC: 38.9 % (ref 41–53)
HEMOGLOBIN: 13 G/DL (ref 13.5–17.5)
IMMATURE GRANULOCYTES: ABNORMAL %
LYMPHOCYTES # BLD: 26 % (ref 24–44)
MCH RBC QN AUTO: 29.3 PG (ref 26–34)
MCHC RBC AUTO-ENTMCNC: 33.4 G/DL (ref 31–37)
MCV RBC AUTO: 87.8 FL (ref 80–100)
MONOCYTES # BLD: 10 % (ref 1–7)
NRBC AUTOMATED: ABNORMAL PER 100 WBC
PDW BLD-RTO: 14 % (ref 11.5–14.9)
PLATELET # BLD: 323 K/UL (ref 150–450)
PLATELET ESTIMATE: ABNORMAL
PMV BLD AUTO: 6.7 FL (ref 6–12)
POTASSIUM SERPL-SCNC: 4.9 MMOL/L (ref 3.7–5.3)
PRO-BNP: 270 PG/ML
RBC # BLD: 4.43 M/UL (ref 4.5–5.9)
RBC # BLD: ABNORMAL 10*6/UL
SEG NEUTROPHILS: 58 % (ref 36–66)
SEGMENTED NEUTROPHILS ABSOLUTE COUNT: 4.2 K/UL (ref 1.3–9.1)
SODIUM BLD-SCNC: 134 MMOL/L (ref 135–144)
TOTAL PROTEIN: 7.6 G/DL (ref 6.4–8.3)
TROPONIN INTERP: ABNORMAL
TROPONIN INTERP: ABNORMAL
TROPONIN T: ABNORMAL NG/ML
TROPONIN T: ABNORMAL NG/ML
TROPONIN, HIGH SENSITIVITY: 23 NG/L (ref 0–22)
TROPONIN, HIGH SENSITIVITY: 25 NG/L (ref 0–22)
TSH SERPL DL<=0.05 MIU/L-ACNC: 3.38 MIU/L (ref 0.3–5)
WBC # BLD: 7.2 K/UL (ref 3.5–11)
WBC # BLD: ABNORMAL 10*3/UL

## 2019-06-14 PROCEDURE — 78582 LUNG VENTILAT&PERFUS IMAGING: CPT

## 2019-06-14 PROCEDURE — 36415 COLL VENOUS BLD VENIPUNCTURE: CPT

## 2019-06-14 PROCEDURE — 2580000003 HC RX 258: Performed by: EMERGENCY MEDICINE

## 2019-06-14 PROCEDURE — 71046 X-RAY EXAM CHEST 2 VIEWS: CPT

## 2019-06-14 PROCEDURE — 93005 ELECTROCARDIOGRAM TRACING: CPT | Performed by: EMERGENCY MEDICINE

## 2019-06-14 PROCEDURE — 83880 ASSAY OF NATRIURETIC PEPTIDE: CPT

## 2019-06-14 PROCEDURE — 85025 COMPLETE CBC W/AUTO DIFF WBC: CPT

## 2019-06-14 PROCEDURE — 84484 ASSAY OF TROPONIN QUANT: CPT

## 2019-06-14 PROCEDURE — A9538 TC99M PYROPHOSPHATE: HCPCS | Performed by: EMERGENCY MEDICINE

## 2019-06-14 PROCEDURE — 99285 EMERGENCY DEPT VISIT HI MDM: CPT

## 2019-06-14 PROCEDURE — A9540 TC99M MAA: HCPCS | Performed by: EMERGENCY MEDICINE

## 2019-06-14 PROCEDURE — 3430000000 HC RX DIAGNOSTIC RADIOPHARMACEUTICAL: Performed by: EMERGENCY MEDICINE

## 2019-06-14 PROCEDURE — 82947 ASSAY GLUCOSE BLOOD QUANT: CPT

## 2019-06-14 PROCEDURE — 84443 ASSAY THYROID STIM HORMONE: CPT

## 2019-06-14 PROCEDURE — 80053 COMPREHEN METABOLIC PANEL: CPT

## 2019-06-14 RX ORDER — ONDANSETRON 4 MG/1
8 TABLET, ORALLY DISINTEGRATING ORAL ONCE
Status: DISCONTINUED | OUTPATIENT
Start: 2019-06-14 | End: 2019-06-15 | Stop reason: HOSPADM

## 2019-06-14 RX ORDER — SODIUM CHLORIDE 0.9 % (FLUSH) 0.9 %
10 SYRINGE (ML) INJECTION PRN
Status: DISCONTINUED | OUTPATIENT
Start: 2019-06-14 | End: 2019-06-15 | Stop reason: HOSPADM

## 2019-06-14 RX ADMIN — Medication 10 ML: at 18:34

## 2019-06-14 RX ADMIN — Medication 47.4 MILLICURIE: at 18:14

## 2019-06-14 RX ADMIN — Medication 10 ML: at 18:35

## 2019-06-14 RX ADMIN — Medication 8.8 MILLICURIE: at 18:35

## 2019-06-14 ASSESSMENT — PAIN DESCRIPTION - LOCATION: LOCATION: BACK

## 2019-06-14 ASSESSMENT — ENCOUNTER SYMPTOMS
ABDOMINAL PAIN: 0
SHORTNESS OF BREATH: 1
VOMITING: 0
WHEEZING: 0
COUGH: 0
BACK PAIN: 0
BLOOD IN STOOL: 0

## 2019-06-14 ASSESSMENT — PAIN DESCRIPTION - ONSET: ONSET: ON-GOING

## 2019-06-14 ASSESSMENT — PAIN SCALES - GENERAL: PAINLEVEL_OUTOF10: 5

## 2019-06-14 ASSESSMENT — PAIN DESCRIPTION - PROGRESSION: CLINICAL_PROGRESSION: NOT CHANGED

## 2019-06-14 ASSESSMENT — PAIN DESCRIPTION - FREQUENCY: FREQUENCY: CONTINUOUS

## 2019-06-14 ASSESSMENT — PAIN DESCRIPTION - PAIN TYPE: TYPE: CHRONIC PAIN

## 2019-06-14 NOTE — ED NOTES
Pt's wife notified RN that pt was released from [facility] last Friday after receiving rehab for a CVA and stent placed in carotid artery. Spouse states that pt was on a thickened liquids during stay at [facility] and [facility], yet has been drinking regular fluids since at home. Spouse denies seeing any problems with swallowing.        Radha Muhammad RN  06/14/19 1142

## 2019-06-14 NOTE — ED PROVIDER NOTES
[address]  eMERGENCY dEPARTMENT eNCOUnter      Pt Name: Nickie Pierce  MRN: [id number]  [place] 3/12/1931  Date of evaluation: 6/14/19      CHIEF COMPLAINT       Chief Complaint   Patient presents with    Shortness of Breath    Fatigue         HISTORY OF PRESENT ILLNESS   HPI 80 y.o. male is brought to the emergency department for evaluation of hypoxia. The patient is currently enrolled in hospice. The patient has been having oxygen saturations dropping to 50% and 60% with any type of ambulation. The patient states that he feels short of breath and very fatigued. No chest pain. No fevers or chills. No cough. No blood in his stool. No vomiting or diarrhea. No chest pain. Patient reports that the symptoms have just been progressively worsening over the last 9 months. No edema. No weight gain. REVIEW OF SYSTEMS     Review of Systems   Constitutional: Positive for activity change and fatigue. HENT: Negative for congestion and postnasal drip. Eyes: Negative for visual disturbance. Respiratory: Positive for shortness of breath. Negative for cough and wheezing. Cardiovascular: Negative for chest pain and leg swelling. Gastrointestinal: Negative for abdominal pain, blood in stool and vomiting. Genitourinary: Negative for dysuria. Musculoskeletal: Negative for back pain. Skin: Negative for rash. Neurological: Positive for weakness (Generalized). Negative for headaches. Hematological: Negative for adenopathy.          PAST MEDICAL HISTORY     Past Medical History:   Diagnosis Date    Arthritis     Asthma     Cystitis     Diabetic neuropathy ([facility] [address]     Diarrhea     Dizziness     GERD (gastroesophageal reflux disease)     H/O acute bronchitis     [place] (hard of hearing)     Hx-TIA (transient ischemic attack)     h/o 4,  2009, 2012, 2014, 2015    Hypertension     Kyphosis of thoracolumbar region     Neurogenic claudication     Numbness and tingling     feet    Onychomycosis     Prolonged emergence from general anesthesia     Spinal stenosis of lumbar region     Spondylolysis of lumbar region     Type II or unspecified type diabetes mellitus without mention of complication, not stated as uncontrolled     Wears glasses        SURGICAL HISTORY       Past Surgical History:   Procedure Laterality Date    CAROTID STENT PLACEMENT      CATARACT REMOVAL WITH IMPLANT Bilateral     COLONOSCOPY      CYSTOSCOPY      INGUINAL HERNIA REPAIR Bilateral     LUMBAR SPINE SURGERY      lumbar-6 screws and 2 bars    REVISION TOTAL KNEE ARTHROPLASTY Left     total of 3 d/t infections    TONSILLECTOMY      TOTAL HIP ARTHROPLASTY Right 07/13/2016    TOTAL KNEE ARTHROPLASTY Left     , 4 total surgeries, infection, I&D, spacer placed and removed       CURRENT MEDICATIONS       Previous Medications    ACETAMINOPHEN (TYLENOL) 500 MG TABLET    Take 500 mg by mouth every 6 hours as needed for Pain    ASPIRIN 81 MG EC TABLET    Take 1 tablet by mouth daily    B-D ULTRAFINE III SHORT PEN 31G X 8 MM MISC        BD ULTRA-FINE PEN NEEDLES 29G X 12.7MM MISC    Inject 1 each into the skin daily     CLOPIDOGREL (PLAVIX) 75 MG TABLET    Take 1 tablet by mouth daily    CLOTRIMAZOLE-BETAMETHASONE (LOTRISONE) 1-0.05 % CREAM    Apply topically 2 times daily Apply topically 2 times daily. FLUTICASONE (FLONASE) 50 MCG/ACT NASAL SPRAY    2 sprays by Nasal route daily 2 SPRAYS IN EACH NOSTRIL. LANTUS SOLOSTAR 100 UNIT/ML INJECTION PEN    Inject 60 Units into the skin daily    LEVOTHYROXINE (SYNTHROID) 50 MCG TABLET        LOPERAMIDE (IMODIUM) 2 MG CAPSULE    Take 2 mg by mouth 4 times daily as needed.       METOPROLOL TARTRATE (LOPRESSOR) 50 MG TABLET    Take 50 mg by mouth daily     MONTELUKAST (SINGULAIR) 10 MG TABLET        OMEPRAZOLE (PRILOSEC) 20 MG DELAYED RELEASE CAPSULE    Take 20 mg by mouth Daily Indications: ([facility] Mail order)     POLYETHYLENE GLYCOL (GLYCOLAX) POWDER    Take 17 g by mouth daily    SYMBICORT 80-4.5 MCG/ACT AERO           ALLERGIES     is allergic to bactrim [sulfamethoxazole-trimethoprim]; other; sulfa antibiotics; sulfur; versed [midazolam]; and pcn [penicillins]. FAMILY HISTORY     indicated that his mother is . He indicated that his father is . SOCIAL HISTORY      reports that he has quit smoking. His smoking use included cigarettes. He quit smokeless tobacco use about 32 years ago. He reports that he does not drink alcohol or use drugs. PHYSICAL EXAM     INITIAL VITALS: BP (!) 133/50   Pulse 55   Temp 97.5 °F (36.4 °C) (Oral)   Resp 20   Ht 6' 3\" (1.905 m)   Wt 195 lb (88.5 kg)   SpO2 94%   BMI 24.37 kg/m²   General: NAD  Head: Normocephalic, atraumatic  Eye: Pupils equal round reactive to light, no conjunctivitis  Neck: No JVD  Heart: bradycardic with a regular rhythm no murmurs  Lungs:bibasilar crackles, no respiratory distress  Chest wall: No crepitus, no tenderness palpation  Abdomen: Soft, nontender, nondistended, with no peritoneal signs  Neurologic:Alert, fluent speech, able to move all extremities but diffuse 4/5 strength  Extremities:Minimal bilateral LE edema, no calf ttp    MEDICAL DECISION MAKING:     MDM  80year-old patient hospice presenting for evaluation of hypoxia shortness of breath fatigue. We will rule out any heart failure, pneumonia, pneumothorax, pulmonary embolism. Currently patient is satting 97% on room air. Hospital course:    Laboratory studies were reviewed. No BNP elevation. No significant troponin elevation. No anemia. No hypothyroidism. No significant renal failure or electrolyte abnormalities. CXR shows no over CHF, pneumonia or pneumothorax. VQ scan shows low probability for a PE. Etiology of hypoxia at home unclear. Pt has not had further hypoxic episodes here. Pt hypoglycemic prior to d/c. Able to take oral nutrition.       Family will follow with his hospice providers for BRAIN NATRIURETIC PEPTIDE   TSH WITH REFLEX       EMERGENCY DEPARTMENT COURSE:   Vitals:    Vitals:    06/14/19 1745 06/14/19 1800 06/14/19 1900 06/14/19 1915   BP: (!) 139/120 127/63 (!) 125/56 (!) 133/50   Pulse: 61 57 57 55   Resp: 20 21 16 20   Temp:       TempSrc:       SpO2: 97% 97% 96% 94%   Weight:       Height:           The patient was given the following medications while in the emergency department:  Orders Placed This Encounter   Medications    technetium pyrophosphate (PYP) injection 40 millicurie    technetium albumin aggregated (MAA) solution 8 millicurie    sodium chloride flush 0.9 % injection 10 mL    sodium chloride flush 0.9 % injection 10 mL    ondansetron (ZOFRAN-ODT) disintegrating tablet 8 mg     -------------------------  CRITICAL CARE:   CONSULTS: None  PROCEDURES: Procedures     FINAL IMPRESSION      1. Fatigue, unspecified type    2.  Hypoxia          DISPOSITION/PLAN   DISPOSITION Decision To Discharge 06/14/2019 07:17:34 PM      PATIENT REFERRED TO:  Amairani Hickman DO  [address] [facility]  [phone]    In 3 days      [facility] ED  [facility] [address] [place] [address]  [phone]    If symptoms worsen      DISCHARGE MEDICATIONS:  New Prescriptions    No medications on file         Catherine Mason MD  Attending Emergency Physician                      Catherine Mason MD  06/15/19 0747

## 2019-06-15 LAB
EKG ATRIAL RATE: 57 BPM
EKG P AXIS: 4 DEGREES
EKG P-R INTERVAL: 172 MS
EKG Q-T INTERVAL: 438 MS
EKG QRS DURATION: 96 MS
EKG QTC CALCULATION (BAZETT): 426 MS
EKG R AXIS: -19 DEGREES
EKG T AXIS: -8 DEGREES
EKG VENTRICULAR RATE: 57 BPM

## 2019-06-15 PROCEDURE — 93010 ELECTROCARDIOGRAM REPORT: CPT | Performed by: INTERNAL MEDICINE

## 2019-08-19 ENCOUNTER — TELEPHONE (OUTPATIENT)
Dept: UROLOGY | Age: 84
End: 2019-08-19

## 2019-09-06 ENCOUNTER — HOSPITAL ENCOUNTER (EMERGENCY)
Age: 84
Discharge: HOME | End: 2019-09-07
Attending: EMERGENCY MEDICINE
Payer: MEDICARE

## 2019-09-06 VITALS
TEMPERATURE: 98 F | DIASTOLIC BLOOD PRESSURE: 73 MMHG | HEIGHT: 75 IN | SYSTOLIC BLOOD PRESSURE: 160 MMHG | OXYGEN SATURATION: 96 % | WEIGHT: 170 LBS | HEART RATE: 85 BPM | BODY MASS INDEX: 21.14 KG/M2 | RESPIRATION RATE: 18 BRPM

## 2019-09-06 DIAGNOSIS — N39.0 URINARY TRACT INFECTION ASSOCIATED WITH INDWELLING URETHRAL CATHETER, INITIAL ENCOUNTER (HCC): Primary | ICD-10-CM

## 2019-09-06 DIAGNOSIS — T83.511A URINARY TRACT INFECTION ASSOCIATED WITH INDWELLING URETHRAL CATHETER, INITIAL ENCOUNTER (HCC): Primary | ICD-10-CM

## 2019-09-06 LAB
-: NORMAL
AMORPHOUS: NORMAL
BACTERIA: NORMAL
BILIRUBIN URINE: NEGATIVE
CASTS UA: NORMAL /LPF
COLOR: YELLOW
COMMENT UA: ABNORMAL
CRYSTALS, UA: NORMAL /HPF
EPITHELIAL CELLS UA: NORMAL /HPF
GLUCOSE URINE: NEGATIVE
KETONES, URINE: NEGATIVE
LEUKOCYTE ESTERASE, URINE: ABNORMAL
MUCUS: NORMAL
NITRITE, URINE: NEGATIVE
OTHER OBSERVATIONS UA: NORMAL
PH UA: 6.5 (ref 5–8)
PROTEIN UA: ABNORMAL
RBC UA: NORMAL /HPF
RENAL EPITHELIAL, UA: NORMAL /HPF
SPECIFIC GRAVITY UA: 1.01 (ref 1–1.03)
TRICHOMONAS: NORMAL
TURBIDITY: ABNORMAL
URINE HGB: ABNORMAL
UROBILINOGEN, URINE: NORMAL
WBC UA: NORMAL /HPF
YEAST: NORMAL

## 2019-09-06 PROCEDURE — 81001 URINALYSIS AUTO W/SCOPE: CPT

## 2019-09-06 PROCEDURE — 87086 URINE CULTURE/COLONY COUNT: CPT

## 2019-09-06 PROCEDURE — 87077 CULTURE AEROBIC IDENTIFY: CPT

## 2019-09-06 PROCEDURE — 6370000000 HC RX 637 (ALT 250 FOR IP): Performed by: EMERGENCY MEDICINE

## 2019-09-06 PROCEDURE — 51702 INSERT TEMP BLADDER CATH: CPT

## 2019-09-06 PROCEDURE — 87186 SC STD MICRODIL/AGAR DIL: CPT

## 2019-09-06 PROCEDURE — 99284 EMERGENCY DEPT VISIT MOD MDM: CPT

## 2019-09-06 RX ORDER — CEPHALEXIN 500 MG/1
500 CAPSULE ORAL 2 TIMES DAILY
Qty: 14 CAPSULE | Refills: 0 | Status: SHIPPED | OUTPATIENT
Start: 2019-09-06 | End: 2019-09-13

## 2019-09-06 RX ORDER — LIDOCAINE HYDROCHLORIDE 20 MG/ML
JELLY TOPICAL ONCE
Status: COMPLETED | OUTPATIENT
Start: 2019-09-06 | End: 2019-09-06

## 2019-09-06 RX ORDER — CEPHALEXIN 250 MG/1
500 CAPSULE ORAL ONCE
Status: COMPLETED | OUTPATIENT
Start: 2019-09-06 | End: 2019-09-06

## 2019-09-06 RX ADMIN — CEPHALEXIN 500 MG: 250 CAPSULE ORAL at 23:11

## 2019-09-06 RX ADMIN — LIDOCAINE HYDROCHLORIDE: 20 JELLY TOPICAL at 22:40

## 2019-09-06 ASSESSMENT — PAIN SCALES - GENERAL: PAINLEVEL_OUTOF10: 10

## 2019-09-08 LAB
CULTURE: ABNORMAL
Lab: ABNORMAL
SPECIMEN DESCRIPTION: ABNORMAL

## 2019-12-01 ENCOUNTER — HOSPITAL ENCOUNTER (EMERGENCY)
Age: 84
Discharge: HOME | End: 2019-12-01
Attending: EMERGENCY MEDICINE
Payer: MEDICARE

## 2019-12-01 VITALS
BODY MASS INDEX: 18.65 KG/M2 | HEART RATE: 97 BPM | TEMPERATURE: 97.6 F | DIASTOLIC BLOOD PRESSURE: 76 MMHG | SYSTOLIC BLOOD PRESSURE: 161 MMHG | HEIGHT: 75 IN | OXYGEN SATURATION: 96 % | WEIGHT: 150 LBS | RESPIRATION RATE: 16 BRPM

## 2019-12-01 DIAGNOSIS — T83.511A URINARY TRACT INFECTION ASSOCIATED WITH CATHETERIZATION OF URINARY TRACT, UNSPECIFIED INDWELLING URINARY CATHETER TYPE, INITIAL ENCOUNTER (HCC): Primary | ICD-10-CM

## 2019-12-01 DIAGNOSIS — N39.0 URINARY TRACT INFECTION ASSOCIATED WITH CATHETERIZATION OF URINARY TRACT, UNSPECIFIED INDWELLING URINARY CATHETER TYPE, INITIAL ENCOUNTER (HCC): Primary | ICD-10-CM

## 2019-12-01 LAB
-: NORMAL
AMORPHOUS: NORMAL
BACTERIA: NORMAL
BILIRUBIN URINE: NEGATIVE
CASTS UA: NORMAL /LPF
COLOR: YELLOW
COMMENT UA: ABNORMAL
CRYSTALS, UA: NORMAL /HPF
EPITHELIAL CELLS UA: NORMAL /HPF
GLUCOSE URINE: NEGATIVE
KETONES, URINE: NEGATIVE
LEUKOCYTE ESTERASE, URINE: ABNORMAL
MUCUS: NORMAL
NITRITE, URINE: POSITIVE
OTHER OBSERVATIONS UA: NORMAL
PH UA: 7 (ref 5–8)
PROTEIN UA: ABNORMAL
RBC UA: NORMAL /HPF
RENAL EPITHELIAL, UA: NORMAL /HPF
SPECIFIC GRAVITY UA: 1.01 (ref 1–1.03)
TRICHOMONAS: NORMAL
TURBIDITY: ABNORMAL
URINE HGB: ABNORMAL
UROBILINOGEN, URINE: NORMAL
WBC UA: NORMAL /HPF
YEAST: NORMAL

## 2019-12-01 PROCEDURE — 99283 EMERGENCY DEPT VISIT LOW MDM: CPT

## 2019-12-01 PROCEDURE — 87086 URINE CULTURE/COLONY COUNT: CPT

## 2019-12-01 PROCEDURE — 6370000000 HC RX 637 (ALT 250 FOR IP): Performed by: EMERGENCY MEDICINE

## 2019-12-01 PROCEDURE — 81001 URINALYSIS AUTO W/SCOPE: CPT

## 2019-12-01 PROCEDURE — 87186 SC STD MICRODIL/AGAR DIL: CPT

## 2019-12-01 PROCEDURE — 87077 CULTURE AEROBIC IDENTIFY: CPT

## 2019-12-01 RX ORDER — CIPROFLOXACIN 500 MG/1
500 TABLET, FILM COATED ORAL ONCE
Status: COMPLETED | OUTPATIENT
Start: 2019-12-01 | End: 2019-12-01

## 2019-12-01 RX ORDER — CIPROFLOXACIN 500 MG/1
500 TABLET, FILM COATED ORAL 2 TIMES DAILY
Qty: 20 TABLET | Refills: 0 | Status: SHIPPED | OUTPATIENT
Start: 2019-12-01 | End: 2019-12-11

## 2019-12-01 RX ADMIN — CIPROFLOXACIN HYDROCHLORIDE 500 MG: 500 TABLET, FILM COATED ORAL at 01:31

## 2019-12-01 ASSESSMENT — PAIN DESCRIPTION - DESCRIPTORS: DESCRIPTORS: ACHING

## 2019-12-01 ASSESSMENT — ENCOUNTER SYMPTOMS
DIARRHEA: 0
COLOR CHANGE: 0
BLOOD IN STOOL: 0
TROUBLE SWALLOWING: 0
CONSTIPATION: 0
NAUSEA: 0
SORE THROAT: 0
SHORTNESS OF BREATH: 0
BACK PAIN: 0
ABDOMINAL PAIN: 1
COUGH: 0
VOMITING: 0

## 2019-12-01 ASSESSMENT — PAIN SCALES - GENERAL: PAINLEVEL_OUTOF10: 10

## 2019-12-01 ASSESSMENT — PAIN DESCRIPTION - FREQUENCY: FREQUENCY: CONTINUOUS

## 2019-12-04 LAB
CULTURE: ABNORMAL
CULTURE: ABNORMAL
Lab: ABNORMAL
SPECIMEN DESCRIPTION: ABNORMAL

## 2022-06-07 NOTE — ED NOTES

## 2024-03-27 NOTE — ED NOTES
[facility] 57, gave pt orange juice and pudding, relayed to dr. Sarah Rodriguez.  No new orders received     [facility], [address] [facility]  06/14/19 2004 [Normocephalic] : normocephalic [Supple] : supple [EOMI] : extra ocular movement intact [No Supraclavicular Adenopathy] : no supraclavicular adenopathy [No Cervical Adenopathy] : no cervical adenopathy [de-identified] : Bilateral breast/axilla/supraclavicular area: No masses, discharge, or adenopathy\par